# Patient Record
Sex: MALE | Race: BLACK OR AFRICAN AMERICAN | Employment: OTHER | URBAN - METROPOLITAN AREA
[De-identification: names, ages, dates, MRNs, and addresses within clinical notes are randomized per-mention and may not be internally consistent; named-entity substitution may affect disease eponyms.]

---

## 2017-10-10 PROBLEM — Z95.810 S/P ICD (INTERNAL CARDIAC DEFIBRILLATOR) PROCEDURE: Status: ACTIVE | Noted: 2017-10-10

## 2017-11-21 PROBLEM — T82.118D: Status: ACTIVE | Noted: 2017-11-21

## 2017-11-21 PROBLEM — I42.0 DILATED CARDIOMYOPATHY (HCC): Status: ACTIVE | Noted: 2017-11-21

## 2019-07-15 ENCOUNTER — APPOINTMENT (OUTPATIENT)
Dept: CT IMAGING | Age: 54
End: 2019-07-15
Payer: COMMERCIAL

## 2019-07-15 ENCOUNTER — HOSPITAL ENCOUNTER (OUTPATIENT)
Age: 54
Setting detail: OBSERVATION
Discharge: HOME | End: 2019-07-16
Attending: EMERGENCY MEDICINE | Admitting: INTERNAL MEDICINE
Payer: COMMERCIAL

## 2019-07-15 ENCOUNTER — APPOINTMENT (OUTPATIENT)
Dept: GENERAL RADIOLOGY | Age: 54
End: 2019-07-15
Payer: COMMERCIAL

## 2019-07-15 DIAGNOSIS — I50.9 CHRONIC CONGESTIVE HEART FAILURE, UNSPECIFIED HEART FAILURE TYPE (HCC): ICD-10-CM

## 2019-07-15 DIAGNOSIS — R06.89 DYSPNEA AND RESPIRATORY ABNORMALITIES: ICD-10-CM

## 2019-07-15 DIAGNOSIS — R07.9 CHEST PAIN, UNSPECIFIED TYPE: Primary | ICD-10-CM

## 2019-07-15 DIAGNOSIS — Z86.79 HISTORY OF CHRONIC CONGESTIVE HEART FAILURE: ICD-10-CM

## 2019-07-15 DIAGNOSIS — R06.00 DYSPNEA AND RESPIRATORY ABNORMALITIES: ICD-10-CM

## 2019-07-15 PROBLEM — I10 ESSENTIAL HYPERTENSION: Status: ACTIVE | Noted: 2019-07-15

## 2019-07-15 LAB
ALBUMIN SERPL-MCNC: 4.5 G/DL (ref 3.5–5.2)
ALP BLD-CCNC: 72 U/L (ref 40–129)
ALT SERPL-CCNC: 8 U/L (ref 0–40)
ANION GAP SERPL CALCULATED.3IONS-SCNC: 11 MMOL/L (ref 7–16)
AST SERPL-CCNC: 12 U/L (ref 0–39)
BASOPHILS ABSOLUTE: 0.04 E9/L (ref 0–0.2)
BASOPHILS RELATIVE PERCENT: 0.4 % (ref 0–2)
BILIRUB SERPL-MCNC: 2.2 MG/DL (ref 0–1.2)
BUN BLDV-MCNC: 15 MG/DL (ref 6–20)
CALCIUM SERPL-MCNC: 9.5 MG/DL (ref 8.6–10.2)
CHLORIDE BLD-SCNC: 103 MMOL/L (ref 98–107)
CO2: 25 MMOL/L (ref 22–29)
CREAT SERPL-MCNC: 1.5 MG/DL (ref 0.7–1.2)
D DIMER: 285 NG/ML DDU
EOSINOPHILS ABSOLUTE: 0.12 E9/L (ref 0.05–0.5)
EOSINOPHILS RELATIVE PERCENT: 1.2 % (ref 0–6)
GFR AFRICAN AMERICAN: 59
GFR NON-AFRICAN AMERICAN: 59 ML/MIN/1.73
GLUCOSE BLD-MCNC: 182 MG/DL (ref 74–99)
HCT VFR BLD CALC: 44.4 % (ref 37–54)
HEMOGLOBIN: 15.3 G/DL (ref 12.5–16.5)
IMMATURE GRANULOCYTES #: 0.03 E9/L
IMMATURE GRANULOCYTES %: 0.3 % (ref 0–5)
LYMPHOCYTES ABSOLUTE: 2.25 E9/L (ref 1.5–4)
LYMPHOCYTES RELATIVE PERCENT: 23.1 % (ref 20–42)
MAGNESIUM: 1.8 MG/DL (ref 1.6–2.6)
MCH RBC QN AUTO: 30.2 PG (ref 26–35)
MCHC RBC AUTO-ENTMCNC: 34.5 % (ref 32–34.5)
MCV RBC AUTO: 87.6 FL (ref 80–99.9)
MONOCYTES ABSOLUTE: 0.64 E9/L (ref 0.1–0.95)
MONOCYTES RELATIVE PERCENT: 6.6 % (ref 2–12)
NEUTROPHILS ABSOLUTE: 6.64 E9/L (ref 1.8–7.3)
NEUTROPHILS RELATIVE PERCENT: 68.4 % (ref 43–80)
PDW BLD-RTO: 13.1 FL (ref 11.5–15)
PLATELET # BLD: 216 E9/L (ref 130–450)
PMV BLD AUTO: 9.8 FL (ref 7–12)
POTASSIUM REFLEX MAGNESIUM: 3.3 MMOL/L (ref 3.5–5)
PRO-BNP: 4321 PG/ML (ref 0–125)
RBC # BLD: 5.07 E12/L (ref 3.8–5.8)
SODIUM BLD-SCNC: 139 MMOL/L (ref 132–146)
TOTAL PROTEIN: 7.8 G/DL (ref 6.4–8.3)
TROPONIN: <0.01 NG/ML (ref 0–0.03)
WBC # BLD: 9.7 E9/L (ref 4.5–11.5)

## 2019-07-15 PROCEDURE — 83880 ASSAY OF NATRIURETIC PEPTIDE: CPT

## 2019-07-15 PROCEDURE — 85025 COMPLETE CBC W/AUTO DIFF WBC: CPT

## 2019-07-15 PROCEDURE — 99220 PR INITIAL OBSERVATION CARE/DAY 70 MINUTES: CPT | Performed by: INTERNAL MEDICINE

## 2019-07-15 PROCEDURE — 6370000000 HC RX 637 (ALT 250 FOR IP)

## 2019-07-15 PROCEDURE — 83735 ASSAY OF MAGNESIUM: CPT

## 2019-07-15 PROCEDURE — 80053 COMPREHEN METABOLIC PANEL: CPT

## 2019-07-15 PROCEDURE — 99285 EMERGENCY DEPT VISIT HI MDM: CPT

## 2019-07-15 PROCEDURE — 6360000004 HC RX CONTRAST MEDICATION: Performed by: RADIOLOGY

## 2019-07-15 PROCEDURE — 84484 ASSAY OF TROPONIN QUANT: CPT

## 2019-07-15 PROCEDURE — 85378 FIBRIN DEGRADE SEMIQUANT: CPT

## 2019-07-15 PROCEDURE — G0378 HOSPITAL OBSERVATION PER HR: HCPCS

## 2019-07-15 PROCEDURE — 93005 ELECTROCARDIOGRAM TRACING: CPT | Performed by: NURSE PRACTITIONER

## 2019-07-15 PROCEDURE — 71046 X-RAY EXAM CHEST 2 VIEWS: CPT

## 2019-07-15 PROCEDURE — 71275 CT ANGIOGRAPHY CHEST: CPT

## 2019-07-15 RX ORDER — SODIUM CHLORIDE 0.9 % (FLUSH) 0.9 %
10 SYRINGE (ML) INJECTION PRN
Status: DISCONTINUED | OUTPATIENT
Start: 2019-07-15 | End: 2019-07-16 | Stop reason: HOSPADM

## 2019-07-15 RX ORDER — ASPIRIN 325 MG
325 TABLET ORAL ONCE
Status: DISCONTINUED | OUTPATIENT
Start: 2019-07-15 | End: 2019-07-15

## 2019-07-15 RX ORDER — ATORVASTATIN CALCIUM 80 MG/1
80 TABLET, FILM COATED ORAL DAILY
COMMUNITY
End: 2020-01-15 | Stop reason: SDUPTHER

## 2019-07-15 RX ORDER — FUROSEMIDE 10 MG/ML
40 INJECTION INTRAMUSCULAR; INTRAVENOUS ONCE
Status: COMPLETED | OUTPATIENT
Start: 2019-07-15 | End: 2019-07-16

## 2019-07-15 RX ORDER — ASPIRIN 81 MG/1
81 TABLET, CHEWABLE ORAL DAILY
Status: DISCONTINUED | OUTPATIENT
Start: 2019-07-16 | End: 2019-07-16 | Stop reason: HOSPADM

## 2019-07-15 RX ORDER — ASPIRIN 81 MG/1
TABLET, CHEWABLE ORAL
Status: COMPLETED
Start: 2019-07-15 | End: 2019-07-15

## 2019-07-15 RX ORDER — FUROSEMIDE 10 MG/ML
20 INJECTION INTRAMUSCULAR; INTRAVENOUS 2 TIMES DAILY
Status: DISCONTINUED | OUTPATIENT
Start: 2019-07-16 | End: 2019-07-16

## 2019-07-15 RX ORDER — ATORVASTATIN CALCIUM 40 MG/1
80 TABLET, FILM COATED ORAL DAILY
Status: DISCONTINUED | OUTPATIENT
Start: 2019-07-16 | End: 2019-07-16 | Stop reason: SDUPTHER

## 2019-07-15 RX ORDER — ONDANSETRON 2 MG/ML
4 INJECTION INTRAMUSCULAR; INTRAVENOUS EVERY 6 HOURS PRN
Status: DISCONTINUED | OUTPATIENT
Start: 2019-07-15 | End: 2019-07-16 | Stop reason: HOSPADM

## 2019-07-15 RX ORDER — CARVEDILOL 25 MG/1
25 TABLET ORAL 2 TIMES DAILY WITH MEALS
Status: DISCONTINUED | OUTPATIENT
Start: 2019-07-16 | End: 2019-07-16 | Stop reason: HOSPADM

## 2019-07-15 RX ORDER — HYDRALAZINE HYDROCHLORIDE 50 MG/1
100 TABLET, FILM COATED ORAL 3 TIMES DAILY
Status: DISCONTINUED | OUTPATIENT
Start: 2019-07-15 | End: 2019-07-16 | Stop reason: SDUPTHER

## 2019-07-15 RX ORDER — GLIMEPIRIDE 2 MG/1
2 TABLET ORAL
COMMUNITY

## 2019-07-15 RX ORDER — SODIUM CHLORIDE 0.9 % (FLUSH) 0.9 %
10 SYRINGE (ML) INJECTION EVERY 12 HOURS SCHEDULED
Status: DISCONTINUED | OUTPATIENT
Start: 2019-07-15 | End: 2019-07-16 | Stop reason: HOSPADM

## 2019-07-15 RX ORDER — LISINOPRIL 2.5 MG/1
2.5 TABLET ORAL DAILY
Status: DISCONTINUED | OUTPATIENT
Start: 2019-07-16 | End: 2019-07-16

## 2019-07-15 RX ORDER — ASPIRIN 81 MG/1
243 TABLET, CHEWABLE ORAL ONCE
Status: COMPLETED | OUTPATIENT
Start: 2019-07-15 | End: 2019-07-15

## 2019-07-15 RX ORDER — SPIRONOLACTONE 25 MG/1
50 TABLET ORAL DAILY
Status: DISCONTINUED | OUTPATIENT
Start: 2019-07-16 | End: 2019-07-16

## 2019-07-15 RX ADMIN — IOPAMIDOL 80 ML: 755 INJECTION, SOLUTION INTRAVENOUS at 20:21

## 2019-07-15 RX ADMIN — ASPIRIN 81 MG 243 MG: 81 TABLET ORAL at 21:02

## 2019-07-15 RX ADMIN — ASPIRIN 243 MG: 81 TABLET, CHEWABLE ORAL at 21:02

## 2019-07-15 ASSESSMENT — ENCOUNTER SYMPTOMS
NAUSEA: 0
WHEEZING: 0
SINUS PRESSURE: 0
SHORTNESS OF BREATH: 1
SORE THROAT: 0
VOMITING: 0
EYE DISCHARGE: 0
BACK PAIN: 1
DIARRHEA: 0
ABDOMINAL PAIN: 0
EYE REDNESS: 0
EYE PAIN: 0
COUGH: 0

## 2019-07-15 ASSESSMENT — PAIN SCALES - GENERAL: PAINLEVEL_OUTOF10: 0

## 2019-07-15 NOTE — ED PROVIDER NOTES
Cardiac defibrillator placement (Left, 10/10/2017). Social History:  reports that he has never smoked. He has never used smokeless tobacco. He reports that he has current or past drug history. Drug: Marijuana. He reports that he does not drink alcohol. Family History: family history is not on file. The patients home medications have been reviewed. Allergies: Patient has no known allergies.     -------------------------------------------------- RESULTS -------------------------------------------------    LABS:  Results for orders placed or performed during the hospital encounter of 07/15/19   CBC Auto Differential   Result Value Ref Range    WBC 9.7 4.5 - 11.5 E9/L    RBC 5.07 3.80 - 5.80 E12/L    Hemoglobin 15.3 12.5 - 16.5 g/dL    Hematocrit 44.4 37.0 - 54.0 %    MCV 87.6 80.0 - 99.9 fL    MCH 30.2 26.0 - 35.0 pg    MCHC 34.5 32.0 - 34.5 %    RDW 13.1 11.5 - 15.0 fL    Platelets 812 414 - 470 E9/L    MPV 9.8 7.0 - 12.0 fL    Neutrophils % 68.4 43.0 - 80.0 %    Immature Granulocytes % 0.3 0.0 - 5.0 %    Lymphocytes % 23.1 20.0 - 42.0 %    Monocytes % 6.6 2.0 - 12.0 %    Eosinophils % 1.2 0.0 - 6.0 %    Basophils % 0.4 0.0 - 2.0 %    Neutrophils # 6.64 1.80 - 7.30 E9/L    Immature Granulocytes # 0.03 E9/L    Lymphocytes # 2.25 1.50 - 4.00 E9/L    Monocytes # 0.64 0.10 - 0.95 E9/L    Eosinophils # 0.12 0.05 - 0.50 E9/L    Basophils # 0.04 0.00 - 0.20 E9/L   Troponin   Result Value Ref Range    Troponin <0.01 0.00 - 0.03 ng/mL   Brain Natriuretic Peptide   Result Value Ref Range    Pro-BNP 4,321 (H) 0 - 125 pg/mL   Comprehensive Metabolic Panel w/ Reflex to MG   Result Value Ref Range    Sodium 139 132 - 146 mmol/L    Potassium reflex Magnesium 3.3 (L) 3.5 - 5.0 mmol/L    Chloride 103 98 - 107 mmol/L    CO2 25 22 - 29 mmol/L    Anion Gap 11 7 - 16 mmol/L    Glucose 182 (H) 74 - 99 mg/dL    BUN 15 6 - 20 mg/dL    CREATININE 1.5 (H) 0.7 - 1.2 mg/dL    GFR Non-African American 59 >=60 mL/min/1.73    GFR

## 2019-07-16 VITALS
HEIGHT: 72 IN | OXYGEN SATURATION: 97 % | HEART RATE: 81 BPM | WEIGHT: 225.6 LBS | SYSTOLIC BLOOD PRESSURE: 160 MMHG | TEMPERATURE: 98.1 F | DIASTOLIC BLOOD PRESSURE: 80 MMHG | RESPIRATION RATE: 16 BRPM | BODY MASS INDEX: 30.56 KG/M2

## 2019-07-16 LAB
BILIRUBIN URINE: NEGATIVE
BLOOD, URINE: NEGATIVE
CLARITY: CLEAR
COLOR: ABNORMAL
EKG ATRIAL RATE: 81 BPM
EKG P AXIS: 6 DEGREES
EKG P-R INTERVAL: 210 MS
EKG Q-T INTERVAL: 426 MS
EKG QRS DURATION: 118 MS
EKG QTC CALCULATION (BAZETT): 494 MS
EKG R AXIS: 17 DEGREES
EKG T AXIS: 135 DEGREES
EKG VENTRICULAR RATE: 81 BPM
GLUCOSE URINE: 250 MG/DL
KETONES, URINE: NEGATIVE MG/DL
LEUKOCYTE ESTERASE, URINE: NEGATIVE
LV EF: 25 %
LVEF MODALITY: NORMAL
METER GLUCOSE: 182 MG/DL (ref 74–99)
NITRITE, URINE: NEGATIVE
PH UA: 5.5 (ref 5–9)
PROTEIN UA: NEGATIVE MG/DL
SPECIFIC GRAVITY UA: <=1.005 (ref 1–1.03)
TROPONIN: <0.01 NG/ML (ref 0–0.03)
TROPONIN: <0.01 NG/ML (ref 0–0.03)
UROBILINOGEN, URINE: 0.2 E.U./DL

## 2019-07-16 PROCEDURE — 6370000000 HC RX 637 (ALT 250 FOR IP): Performed by: INTERNAL MEDICINE

## 2019-07-16 PROCEDURE — 6360000002 HC RX W HCPCS: Performed by: INTERNAL MEDICINE

## 2019-07-16 PROCEDURE — 84484 ASSAY OF TROPONIN QUANT: CPT

## 2019-07-16 PROCEDURE — 2580000003 HC RX 258: Performed by: INTERNAL MEDICINE

## 2019-07-16 PROCEDURE — 36415 COLL VENOUS BLD VENIPUNCTURE: CPT

## 2019-07-16 PROCEDURE — 96376 TX/PRO/DX INJ SAME DRUG ADON: CPT

## 2019-07-16 PROCEDURE — G0378 HOSPITAL OBSERVATION PER HR: HCPCS

## 2019-07-16 PROCEDURE — 93306 TTE W/DOPPLER COMPLETE: CPT

## 2019-07-16 PROCEDURE — 96374 THER/PROPH/DIAG INJ IV PUSH: CPT

## 2019-07-16 PROCEDURE — 93010 ELECTROCARDIOGRAM REPORT: CPT | Performed by: INTERNAL MEDICINE

## 2019-07-16 PROCEDURE — 99217 PR OBSERVATION CARE DISCHARGE MANAGEMENT: CPT | Performed by: INTERNAL MEDICINE

## 2019-07-16 PROCEDURE — 81003 URINALYSIS AUTO W/O SCOPE: CPT

## 2019-07-16 PROCEDURE — 82962 GLUCOSE BLOOD TEST: CPT

## 2019-07-16 RX ORDER — LISINOPRIL 5 MG/1
5 TABLET ORAL DAILY
Status: DISCONTINUED | OUTPATIENT
Start: 2019-07-16 | End: 2019-07-16

## 2019-07-16 RX ORDER — SPIRONOLACTONE 25 MG/1
25 TABLET ORAL DAILY
Status: DISCONTINUED | OUTPATIENT
Start: 2019-07-16 | End: 2019-07-16 | Stop reason: HOSPADM

## 2019-07-16 RX ORDER — TORSEMIDE 20 MG/1
40 TABLET ORAL DAILY
Qty: 30 TABLET | Refills: 3 | Status: SHIPPED | OUTPATIENT
Start: 2019-07-17 | End: 2020-12-10

## 2019-07-16 RX ORDER — NICOTINE POLACRILEX 4 MG
15 LOZENGE BUCCAL PRN
Status: DISCONTINUED | OUTPATIENT
Start: 2019-07-16 | End: 2019-07-16 | Stop reason: HOSPADM

## 2019-07-16 RX ORDER — DEXTROSE MONOHYDRATE 50 MG/ML
100 INJECTION, SOLUTION INTRAVENOUS PRN
Status: DISCONTINUED | OUTPATIENT
Start: 2019-07-16 | End: 2019-07-16 | Stop reason: HOSPADM

## 2019-07-16 RX ORDER — HYDRALAZINE HYDROCHLORIDE 100 MG/1
100 TABLET, FILM COATED ORAL 3 TIMES DAILY
Qty: 60 TABLET | Refills: 3 | Status: SHIPPED | OUTPATIENT
Start: 2019-07-16

## 2019-07-16 RX ORDER — DEXTROSE MONOHYDRATE 25 G/50ML
12.5 INJECTION, SOLUTION INTRAVENOUS PRN
Status: DISCONTINUED | OUTPATIENT
Start: 2019-07-16 | End: 2019-07-16 | Stop reason: HOSPADM

## 2019-07-16 RX ORDER — HYDRALAZINE HYDROCHLORIDE 100 MG/1
100 TABLET, FILM COATED ORAL 3 TIMES DAILY
Status: DISCONTINUED | OUTPATIENT
Start: 2019-07-16 | End: 2019-07-16 | Stop reason: HOSPADM

## 2019-07-16 RX ORDER — TORSEMIDE 20 MG/1
40 TABLET ORAL DAILY
Status: DISCONTINUED | OUTPATIENT
Start: 2019-07-16 | End: 2019-07-16 | Stop reason: HOSPADM

## 2019-07-16 RX ORDER — ATORVASTATIN CALCIUM 80 MG/1
80 TABLET, FILM COATED ORAL DAILY
Status: DISCONTINUED | OUTPATIENT
Start: 2019-07-16 | End: 2019-07-16 | Stop reason: HOSPADM

## 2019-07-16 RX ADMIN — Medication 10 ML: at 08:43

## 2019-07-16 RX ADMIN — CARVEDILOL 25 MG: 25 TABLET ORAL at 08:43

## 2019-07-16 RX ADMIN — SPIRONOLACTONE 25 MG: 25 TABLET ORAL at 08:43

## 2019-07-16 RX ADMIN — HYDRALAZINE HYDROCHLORIDE 100 MG: 100 TABLET, FILM COATED ORAL at 13:20

## 2019-07-16 RX ADMIN — TORSEMIDE 40 MG: 20 TABLET ORAL at 13:20

## 2019-07-16 RX ADMIN — HYDRALAZINE HYDROCHLORIDE 100 MG: 100 TABLET, FILM COATED ORAL at 07:15

## 2019-07-16 RX ADMIN — FUROSEMIDE 20 MG: 10 INJECTION, SOLUTION INTRAVENOUS at 08:42

## 2019-07-16 RX ADMIN — INSULIN LISPRO 2 UNITS: 100 INJECTION, SOLUTION INTRAVENOUS; SUBCUTANEOUS at 13:25

## 2019-07-16 RX ADMIN — FUROSEMIDE 40 MG: 10 INJECTION, SOLUTION INTRAMUSCULAR; INTRAVENOUS at 00:22

## 2019-07-16 RX ADMIN — LISINOPRIL 5 MG: 5 TABLET ORAL at 08:43

## 2019-07-16 RX ADMIN — ATORVASTATIN CALCIUM 80 MG: 80 TABLET, FILM COATED ORAL at 08:43

## 2019-07-16 RX ADMIN — Medication 10 ML: at 00:20

## 2019-07-16 RX ADMIN — ASPIRIN 81 MG: 81 TABLET, CHEWABLE ORAL at 08:43

## 2019-07-16 ASSESSMENT — PAIN SCALES - GENERAL: PAINLEVEL_OUTOF10: 0

## 2019-07-16 NOTE — PROGRESS NOTES
CHF teaching material provided to patient both the heart failure teaching checklist and the CHF magnets x 2. Discussed the importance of following a low sodium diet, watching weight gain, [facility], and keeping follow up appointments.

## 2019-07-16 NOTE — CONSULTS
CARDIOLOGY CONSULTATION    Patient Name:  Ghada Arreola Sr.    :  1965    Reason for Consultation:   Acute decompensated heart failure    History of Present Illness:   Isabell Lorenz. presents to [facility] following a 2-day history of sudden increase in shortness of breath and inability to lie in a supine position. He went to the emergency room was and was given intravenous diuretic with subsequent improvement. Upon further questioning he absolutely denies ingestion of any salt on his food and insists that he only uses sea salt and Mrs Gavin Bowen. He also states that he was instructed to drink at least 8 cups of water per day. He has not experienced medical noncompliance. Now presents with acute decompensated heart failure. He does have an ICD device implanted by Dr. Jonhny Castillo. He notes that he underwent cardiac catheterization in approximately  and was found to have normal coronary arteriograms. Does have a long-standing history of hypertension however. Past Medical History:   has a past medical history of Cerebral artery occlusion with cerebral infarction [facility]), CHF (congestive heart failure) ([facility] [address] and Hypertension. Surgical History:   has a past surgical history that includes Cardiac defibrillator placement (Left, 10/10/2017). Social History:   reports that he has never smoked. He has never used smokeless tobacco. He reports that he has current or past drug history. Drug: Marijuana. He reports that he does not drink alcohol. Family History:  family history is remarkable for his father  secondary to carcinoma the prostate and mother  CHF. Medications:  Prior to Admission medications    Medication Sig Start Date End Date Taking?  Authorizing Provider   metFORMIN (GLUCOPHAGE) 500 MG tablet Take 500 mg by mouth 2 times daily (with meals)   Yes Historical Provider, MD   glimepiride (AMARYL) 2 MG tablet Take 2 mg by mouth every morning (before intake, or urination. No tremor. · Hematologic/Lymphatic: No abnormal bruising or bleeding, blood clots or swollen lymph nodes. · Allergic/Immunologic: No nasal congestion or hives. Physical Examination:    Vital Signs: BP (!) 160/80   Pulse 81   Temp 98.1 °F (36.7 °C) (Oral)   Resp 16   Ht 6' (1.829 m)   Wt 225 lb 9.6 oz (102.3 kg)   SpO2 97%   BMI 30.60 kg/m²   General appearance: Well preserved, mesomorphic body habitus, alert, no distress. Skin: Skin color, texture, turgor normal. No rashes or lesions. No induration or tightening palpated. Head: Normocephalic. No masses, lesions, tenderness or abnormalities  Eyes: Conjunctivae/corneas clear. PERRL, EOMs intact. Sclera non icteric. Ears: External ears normal. Canals clear. TM's clear bilaterally. Hearing normal to finger rub. Nose/Sinuses: Nares normal. Septum midline. Mucosa normal. No drainage or sinus tenderness. Oropharynx: Lips, mucosa, and tongue normal. Oropharynx clear with no exudate seen. Neck: Neck supple and symmetric. No adenopathy. Thyroid symmetric, normal size, without nodules. Trachea is midline. Carotids brisk in upstroke without bruits, no abnormal JVP noted at 45°. Chest: Even excursion  Lungs: Lungs clear to auscultation bilaterally. No retractions or use of accessory muscles. No tactile vocal fremitus. No rhonchi, crackles or rales. Heart:  S1 > S2. Regular rhythm. Soft S3 gallop grade 2/6 blowing systolic murmur heard at the apex. . No rub, palpable thrill or heave noted. PMI 5th intercostal space midclavicular line. Abdomen: Abdomen soft, moderately protuberant, non-tender. BS normal. No masses, organomegaly. No hernia noted. Extremities: Extremities normal. No deformities, edema, or skin discoloration. No cyanosis or clubbing noted to the nails. Peripheral pulses present 2+ upper extremities and present 1+  lower extremities. Musculoskeletal: Spine ROM normal. Muscular strength intact.   Neuro: Cranial nerves

## 2019-11-04 ENCOUNTER — HOSPITAL ENCOUNTER (EMERGENCY)
Age: 54
Discharge: HOME | End: 2019-11-04
Attending: EMERGENCY MEDICINE
Payer: COMMERCIAL

## 2019-11-04 ENCOUNTER — APPOINTMENT (OUTPATIENT)
Dept: GENERAL RADIOLOGY | Age: 54
End: 2019-11-04
Payer: COMMERCIAL

## 2019-11-04 VITALS
BODY MASS INDEX: 30.48 KG/M2 | SYSTOLIC BLOOD PRESSURE: 143 MMHG | RESPIRATION RATE: 16 BRPM | OXYGEN SATURATION: 97 % | HEIGHT: 72 IN | DIASTOLIC BLOOD PRESSURE: 83 MMHG | WEIGHT: 225 LBS | TEMPERATURE: 97.5 F | HEART RATE: 74 BPM

## 2019-11-04 DIAGNOSIS — I50.43 ACUTE ON CHRONIC COMBINED SYSTOLIC AND DIASTOLIC CHF (CONGESTIVE HEART FAILURE) (HCC): Primary | ICD-10-CM

## 2019-11-04 LAB
ANION GAP SERPL CALCULATED.3IONS-SCNC: 14 MMOL/L (ref 7–16)
APTT: 32.2 SEC (ref 24.5–35.1)
BASOPHILS ABSOLUTE: 0.04 E9/L (ref 0–0.2)
BASOPHILS RELATIVE PERCENT: 0.5 % (ref 0–2)
BUN BLDV-MCNC: 16 MG/DL (ref 6–20)
CALCIUM SERPL-MCNC: 9.6 MG/DL (ref 8.6–10.2)
CHLORIDE BLD-SCNC: 104 MMOL/L (ref 98–107)
CO2: 26 MMOL/L (ref 22–29)
CREAT SERPL-MCNC: 1.4 MG/DL (ref 0.7–1.2)
EKG ATRIAL RATE: 99 BPM
EKG P-R INTERVAL: 194 MS
EKG Q-T INTERVAL: 384 MS
EKG QRS DURATION: 120 MS
EKG QTC CALCULATION (BAZETT): 492 MS
EKG R AXIS: 87 DEGREES
EKG T AXIS: 148 DEGREES
EKG VENTRICULAR RATE: 99 BPM
EOSINOPHILS ABSOLUTE: 0.11 E9/L (ref 0.05–0.5)
EOSINOPHILS RELATIVE PERCENT: 1.4 % (ref 0–6)
GFR AFRICAN AMERICAN: >60
GFR NON-AFRICAN AMERICAN: >60 ML/MIN/1.73
GLUCOSE BLD-MCNC: 164 MG/DL (ref 74–99)
HCT VFR BLD CALC: 41.9 % (ref 37–54)
HEMOGLOBIN: 14 G/DL (ref 12.5–16.5)
IMMATURE GRANULOCYTES #: 0.02 E9/L
IMMATURE GRANULOCYTES %: 0.3 % (ref 0–5)
INR BLD: 1.1
LACTIC ACID: 1.5 MMOL/L (ref 0.5–2.2)
LYMPHOCYTES ABSOLUTE: 1.44 E9/L (ref 1.5–4)
LYMPHOCYTES RELATIVE PERCENT: 18.6 % (ref 20–42)
MAGNESIUM: 1.9 MG/DL (ref 1.6–2.6)
MCH RBC QN AUTO: 29.6 PG (ref 26–35)
MCHC RBC AUTO-ENTMCNC: 33.4 % (ref 32–34.5)
MCV RBC AUTO: 88.6 FL (ref 80–99.9)
MONOCYTES ABSOLUTE: 0.45 E9/L (ref 0.1–0.95)
MONOCYTES RELATIVE PERCENT: 5.8 % (ref 2–12)
NEUTROPHILS ABSOLUTE: 5.67 E9/L (ref 1.8–7.3)
NEUTROPHILS RELATIVE PERCENT: 73.4 % (ref 43–80)
PDW BLD-RTO: 13.1 FL (ref 11.5–15)
PHOSPHORUS: 2.3 MG/DL (ref 2.5–4.5)
PLATELET # BLD: 258 E9/L (ref 130–450)
PMV BLD AUTO: 9.7 FL (ref 7–12)
POTASSIUM SERPL-SCNC: 3.3 MMOL/L (ref 3.5–5)
PRO-BNP: 4833 PG/ML (ref 0–125)
PROTHROMBIN TIME: 12.5 SEC (ref 9.3–12.4)
RBC # BLD: 4.73 E12/L (ref 3.8–5.8)
SODIUM BLD-SCNC: 144 MMOL/L (ref 132–146)
TROPONIN: <0.01 NG/ML (ref 0–0.03)
WBC # BLD: 7.7 E9/L (ref 4.5–11.5)

## 2019-11-04 PROCEDURE — 84100 ASSAY OF PHOSPHORUS: CPT

## 2019-11-04 PROCEDURE — 6370000000 HC RX 637 (ALT 250 FOR IP): Performed by: EMERGENCY MEDICINE

## 2019-11-04 PROCEDURE — 83605 ASSAY OF LACTIC ACID: CPT

## 2019-11-04 PROCEDURE — 99285 EMERGENCY DEPT VISIT HI MDM: CPT

## 2019-11-04 PROCEDURE — 84484 ASSAY OF TROPONIN QUANT: CPT

## 2019-11-04 PROCEDURE — 96374 THER/PROPH/DIAG INJ IV PUSH: CPT

## 2019-11-04 PROCEDURE — 85025 COMPLETE CBC W/AUTO DIFF WBC: CPT

## 2019-11-04 PROCEDURE — 83880 ASSAY OF NATRIURETIC PEPTIDE: CPT

## 2019-11-04 PROCEDURE — 6360000002 HC RX W HCPCS: Performed by: EMERGENCY MEDICINE

## 2019-11-04 PROCEDURE — 80048 BASIC METABOLIC PNL TOTAL CA: CPT

## 2019-11-04 PROCEDURE — 83735 ASSAY OF MAGNESIUM: CPT

## 2019-11-04 PROCEDURE — 85730 THROMBOPLASTIN TIME PARTIAL: CPT

## 2019-11-04 PROCEDURE — 71046 X-RAY EXAM CHEST 2 VIEWS: CPT

## 2019-11-04 PROCEDURE — 85610 PROTHROMBIN TIME: CPT

## 2019-11-04 PROCEDURE — 93010 ELECTROCARDIOGRAM REPORT: CPT | Performed by: INTERNAL MEDICINE

## 2019-11-04 PROCEDURE — 94761 N-INVAS EAR/PLS OXIMETRY MLT: CPT

## 2019-11-04 PROCEDURE — 93005 ELECTROCARDIOGRAM TRACING: CPT | Performed by: EMERGENCY MEDICINE

## 2019-11-04 RX ORDER — FUROSEMIDE 40 MG/1
20 TABLET ORAL ONCE
Status: COMPLETED | OUTPATIENT
Start: 2019-11-04 | End: 2019-11-04

## 2019-11-04 RX ORDER — SPIRONOLACTONE 25 MG/1
25 TABLET ORAL ONCE
Status: COMPLETED | OUTPATIENT
Start: 2019-11-04 | End: 2019-11-04

## 2019-11-04 RX ORDER — HYDRALAZINE HYDROCHLORIDE 50 MG/1
100 TABLET, FILM COATED ORAL ONCE
Status: COMPLETED | OUTPATIENT
Start: 2019-11-04 | End: 2019-11-04

## 2019-11-04 RX ORDER — FUROSEMIDE 10 MG/ML
40 INJECTION INTRAMUSCULAR; INTRAVENOUS ONCE
Status: COMPLETED | OUTPATIENT
Start: 2019-11-04 | End: 2019-11-04

## 2019-11-04 RX ORDER — POTASSIUM CHLORIDE 20 MEQ/1
40 TABLET, EXTENDED RELEASE ORAL ONCE
Status: COMPLETED | OUTPATIENT
Start: 2019-11-04 | End: 2019-11-04

## 2019-11-04 RX ORDER — ASPIRIN 81 MG/1
81 TABLET, CHEWABLE ORAL ONCE
Status: COMPLETED | OUTPATIENT
Start: 2019-11-04 | End: 2019-11-04

## 2019-11-04 RX ORDER — CARVEDILOL 25 MG/1
25 TABLET ORAL ONCE
Status: COMPLETED | OUTPATIENT
Start: 2019-11-04 | End: 2019-11-04

## 2019-11-04 RX ADMIN — CARVEDILOL 25 MG: 25 TABLET, FILM COATED ORAL at 08:04

## 2019-11-04 RX ADMIN — SACUBITRIL AND VALSARTAN 1 TABLET: 24; 26 TABLET, FILM COATED ORAL at 08:05

## 2019-11-04 RX ADMIN — HYDRALAZINE HYDROCHLORIDE 100 MG: 50 TABLET ORAL at 08:05

## 2019-11-04 RX ADMIN — POTASSIUM CHLORIDE 40 MEQ: 20 TABLET, EXTENDED RELEASE ORAL at 08:04

## 2019-11-04 RX ADMIN — ASPIRIN 81 MG: 81 TABLET, CHEWABLE ORAL at 08:03

## 2019-11-04 RX ADMIN — FUROSEMIDE 40 MG: 10 INJECTION, SOLUTION INTRAMUSCULAR; INTRAVENOUS at 07:30

## 2019-11-04 RX ADMIN — FUROSEMIDE 20 MG: 40 TABLET ORAL at 08:05

## 2019-11-04 RX ADMIN — METFORMIN HYDROCHLORIDE 500 MG: 500 TABLET, FILM COATED ORAL at 08:05

## 2019-11-04 RX ADMIN — SPIRONOLACTONE 25 MG: 25 TABLET, FILM COATED ORAL at 08:06

## 2020-01-08 VITALS
SYSTOLIC BLOOD PRESSURE: 152 MMHG | DIASTOLIC BLOOD PRESSURE: 92 MMHG | WEIGHT: 239.86 LBS | HEART RATE: 76 BPM | BODY MASS INDEX: 32.49 KG/M2 | HEIGHT: 72 IN

## 2020-01-11 NOTE — PROGRESS NOTES
Cardiology Progress Note  Dr. Taya Hung      Referring Physician: No referring provider defined for this encounter. CHIEF COMPLAINT: No chief complaint on file. HISTORY OF PRESENT ILLNESS:   Patient is a 48year old male with significant past medical history of nonischemic cardiomyopathy, AICD in situ, hypertension, diabetes mellitus, is here for follow-up appointment. Patient denies any chest pain, no shortness of breath, no lightheadedness, no dizziness, no palpitations, no pedal edema, no PND, no orthopnea, no syncope, no presyncopal episodes. Denies noncompliance with medications and dietary restriction]        Past Medical History:   Diagnosis Date    Cerebral artery occlusion with cerebral infarction ([address]     [facility]est[facility] failure) [facility] [address]     Hypertension          Past Surgical History:   Procedure Laterality Date    CARDIAC DEFIBRILLATOR PLACEMENT Left 10/10/2017    [employer] Dual ICD,          Current Outpatient Medications   Medication Sig Dispense Refill    hydrALAZINE (APRESOLINE) 100 MG tablet Take 1 tablet by mouth 3 times daily 60 tablet 3    sacubitril-valsartan (ENTRESTO) 24-26 MG per tablet Take 1 tablet by mouth 2 times daily 60 tablet 0    torsemide (DEMADEX) 20 MG tablet Take 2 tablets by mouth daily 30 tablet 3    metFORMIN (GLUCOPHAGE) 500 MG tablet Take 500 mg by mouth 2 times daily (with meals)      glimepiride (AMARYL) 2 MG tablet Take 2 mg by mouth every morning (before breakfast)      atorvastatin (LIPITOR) 80 MG tablet Take 80 mg by mouth daily      aspirin 81 MG tablet Take 81 mg by mouth daily      carvedilol (COREG) 25 MG tablet Take 1 tablet by mouth 2 times daily (with meals). 60 tablet 0    spironolactone (ALDACTONE) 25 MG tablet Take 25 mg by mouth daily        No current facility-administered medications for this visit.           Allergies as of 01/15/2020    (No Known Allergies)       Social History     Socioeconomic History    Marital status:      Spouse name: Not on file    Number of children: Not on file    Years of education: Not on file    Highest education level: Not on file   Occupational History    Not on file   Social Needs    Financial resource strain: Not on file    Food insecurity:     Worry: Not on file     Inability: Not on file    Transportation needs:     Medical: Not on file     Non-medical: Not on file   Tobacco Use    Smoking status: Never Smoker    Smokeless tobacco: Never Used   Substance and Sexual Activity    Alcohol use: No     Comment: Quit 12/16    Drug use: Yes     Types: Marijuana    Sexual activity: Not on file   Lifestyle    Physical activity:     Days per week: Not on file     Minutes per session: Not on file    Stress: Not on file   Relationships    Social connections:     Talks on phone: Not on file     Gets together: Not on file     Attends Christian service: Not on file     Active member of club or organization: Not on file     Attends meetings of clubs or organizations: Not on file     Relationship status: Not on file    Intimate partner violence:     Fear of current or ex partner: Not on file     Emotionally abused: Not on file     Physically abused: Not on file     Forced sexual activity: Not on file   Other Topics Concern    Not on file   Social History Narrative    Not on file     No family history for early CAD    REVIEW OF SYSTEMS:     CONSTITUTIONAL:  negative for  fevers, chills, sweats and fatigue  HEENT:  negative for  tinnitus, earaches, nasal congestion and epistaxis  RESPIRATORY:  negative for  dry cough, cough with sputum, dyspnea, wheezing and hemoptysis  GASTROINTESTINAL:  negative for nausea, vomiting, diarrhea, constipation, pruritus and jaundice  HEMATOLOGIC/LYMPHATIC:  negative for easy bruising, bleeding, lymphadenopathy and petechiae  ENDOCRINE:  negative for heat intolerance, cold intolerance, tremor, hair loss and diabetic symptoms including neither polyuria nor ejection fraction is 20-25±5%. Diastolic function cannot be accurately assessed due to significant mitral   regurgitation. The LAESV Index is >34 ml/m2. Defibrillator wire visualized in right ventricle   Increased E point septal separation noted,suggesting decreased LV cardiac   output. Delayed closure of mitral valve suggesting increased end diastolic   pressure. Moderate to severe mitral regurgitation is present. Physiologic and/or trace tricuspid regurgitation. RVSP is 47 mmHg. Pulmonary hypertension is mild . Physiologic and/or trace pulmonic regurgitation present. Technically good quality study. Technically difficult study due to patient''s body habitus. Compared to prior echo, changes noted. Suggest clinical correlation. Stress Test: 12/13/16   Limited visualization of the inferior wall left ventricular myocardium   due to some diaphragmatic activity and attenuation artifact. Remaining   left ventricular myocardium shows no areas of reversibility to suggest   focal ischemia. There is global left ventricular hypokinesis on gated   imaging. Left ventricle is dilated with end-diastolic volume of 806   mL. Ejection fraction calculated at 22%.        Angiography:   Cardiology Labs:   BMP:    Lab Results   Component Value Date     11/04/2019    K 3.3 11/04/2019    K 3.3 07/15/2019     11/04/2019    CO2 26 11/04/2019    BUN 16 11/04/2019     CMP:    Lab Results   Component Value Date     11/04/2019    K 3.3 11/04/2019    K 3.3 07/15/2019     11/04/2019    CO2 26 11/04/2019    BUN 16 11/04/2019    PROT 7.8 07/15/2019     CBC:    Lab Results   Component Value Date    WBC 7.7 11/04/2019    RBC 4.73 11/04/2019    HGB 14.0 11/04/2019    HCT 41.9 11/04/2019    MCV 88.6 11/04/2019    RDW 13.1 11/04/2019     11/04/2019     PT/INR:  No results found for: PTINR  PT/INR Warfarin:  No components found for: PTPATWAR, PTINRWAR  PTT:    Lab Results   Component Value Date APTT 32.2 11/04/2019     PTT Heparin:  No components found for: APTTHEP  Magnesium:    Lab Results   Component Value Date    MG 1.9 11/04/2019     TSH:    Lab Results   Component Value Date    TSH 1.783 12/02/2010     TROPONIN:  No components found for: TROP  BNP:  No results found for: BNP  FASTING LIPID PANEL:    Lab Results   Component Value Date    CHOL 263 01/05/2012    HDL 47.0 01/05/2012    TRIG 161 01/05/2012     No orders to display     I have personally reviewed the laboratory, cardiac diagnostic and radiographic testing as outlined above:      IMPRESSION:  1. Dilated nonischemic cardiomyopathy: EF 20-25%, stable we will continue current treatment  2. Congestive heart failure: acute, systolic, decompensated, improving, will continue current treatment. 3. AICD in situ  4. mild to moderate MR  5. Mild TR  6. mild pulmonary HTN  7. HTN; controlled. 8.  Type 2 diabetes mellitus     RECOMMENDATIONS:   1. Continue current treatment    2. CHF: Daily weight, take an extra Lasix for weight gain of more than 2-3 pounds in 24 hours, compliance with diuretics, low-salt diet were all advised. 3.  Preventive cardiology: Low-salt, low-cholesterol diet, daily exercise, were all advised. 4.  Follow-up with Dr. Richard Davies as scheduled    5. Follow-up with Dr. Arleen Griffiths in 6 months, sooner if symptomatic for any reason    I have reviewed my findings and recommendations with patient and his wife at bedside    Electronically signed by Yamile Menjivar MD on 1/10/2020 at 7:52 PM  NOTE: This report was transcribed using voice recognition software.  Every effort was made to ensure accuracy; however, inadvertent computerized transcription errors may be present

## 2020-01-13 VITALS
WEIGHT: 240 LBS | BODY MASS INDEX: 32.51 KG/M2 | HEIGHT: 72 IN | SYSTOLIC BLOOD PRESSURE: 152 MMHG | HEART RATE: 76 BPM | DIASTOLIC BLOOD PRESSURE: 92 MMHG

## 2020-01-13 RX ORDER — HYDRALAZINE HYDROCHLORIDE 50 MG/1
50 TABLET, FILM COATED ORAL 3 TIMES DAILY
COMMUNITY
End: 2020-01-15 | Stop reason: DRUGHIGH

## 2020-01-13 RX ORDER — ATORVASTATIN CALCIUM 40 MG/1
40 TABLET, FILM COATED ORAL DAILY
COMMUNITY
End: 2020-12-10

## 2020-01-15 ENCOUNTER — OFFICE VISIT (OUTPATIENT)
Dept: CARDIOLOGY CLINIC | Age: 55
End: 2020-01-15
Payer: COMMERCIAL

## 2020-01-15 VITALS
SYSTOLIC BLOOD PRESSURE: 138 MMHG | WEIGHT: 230.6 LBS | HEIGHT: 72 IN | OXYGEN SATURATION: 98 % | BODY MASS INDEX: 31.23 KG/M2 | RESPIRATION RATE: 18 BRPM | DIASTOLIC BLOOD PRESSURE: 82 MMHG | HEART RATE: 74 BPM

## 2020-01-15 PROCEDURE — 99214 OFFICE O/P EST MOD 30 MIN: CPT | Performed by: INTERNAL MEDICINE

## 2020-01-15 PROCEDURE — 93000 ELECTROCARDIOGRAM COMPLETE: CPT | Performed by: INTERNAL MEDICINE

## 2020-01-15 RX ORDER — POTASSIUM CHLORIDE 750 MG/1
10 TABLET, FILM COATED, EXTENDED RELEASE ORAL DAILY
COMMUNITY
Start: 2019-11-08 | End: 2020-12-10

## 2020-01-15 RX ORDER — FUROSEMIDE 20 MG/1
20 TABLET ORAL DAILY
COMMUNITY
Start: 2019-12-13

## 2020-04-13 ENCOUNTER — HOSPITAL ENCOUNTER (EMERGENCY)
Age: 55
Discharge: HOME | End: 2020-04-13
Attending: EMERGENCY MEDICINE
Payer: COMMERCIAL

## 2020-04-13 ENCOUNTER — APPOINTMENT (OUTPATIENT)
Dept: GENERAL RADIOLOGY | Age: 55
End: 2020-04-13
Payer: COMMERCIAL

## 2020-04-13 VITALS
OXYGEN SATURATION: 96 % | WEIGHT: 229 LBS | HEIGHT: 72 IN | HEART RATE: 75 BPM | BODY MASS INDEX: 31.02 KG/M2 | RESPIRATION RATE: 14 BRPM | TEMPERATURE: 98.1 F | DIASTOLIC BLOOD PRESSURE: 90 MMHG | SYSTOLIC BLOOD PRESSURE: 144 MMHG

## 2020-04-13 LAB
ANION GAP SERPL CALCULATED.3IONS-SCNC: 12 MMOL/L (ref 7–16)
BASOPHILS ABSOLUTE: 0.04 E9/L (ref 0–0.2)
BASOPHILS RELATIVE PERCENT: 0.7 % (ref 0–2)
BUN BLDV-MCNC: 18 MG/DL (ref 6–20)
CALCIUM SERPL-MCNC: 9.1 MG/DL (ref 8.6–10.2)
CHLORIDE BLD-SCNC: 102 MMOL/L (ref 98–107)
CK MB: 2.1 NG/ML (ref 0–7.7)
CO2: 25 MMOL/L (ref 22–29)
CREAT SERPL-MCNC: 1.6 MG/DL (ref 0.7–1.2)
EKG ATRIAL RATE: 77 BPM
EKG P AXIS: 16 DEGREES
EKG P-R INTERVAL: 202 MS
EKG Q-T INTERVAL: 442 MS
EKG QRS DURATION: 126 MS
EKG QTC CALCULATION (BAZETT): 500 MS
EKG R AXIS: -28 DEGREES
EKG T AXIS: 108 DEGREES
EKG VENTRICULAR RATE: 77 BPM
EOSINOPHILS ABSOLUTE: 0.17 E9/L (ref 0.05–0.5)
EOSINOPHILS RELATIVE PERCENT: 2.8 % (ref 0–6)
GFR AFRICAN AMERICAN: 55
GFR NON-AFRICAN AMERICAN: 55 ML/MIN/1.73
GLUCOSE BLD-MCNC: 148 MG/DL (ref 74–99)
HCT VFR BLD CALC: 40.6 % (ref 37–54)
HEMOGLOBIN: 13.7 G/DL (ref 12.5–16.5)
IMMATURE GRANULOCYTES #: 0.02 E9/L
IMMATURE GRANULOCYTES %: 0.3 % (ref 0–5)
LYMPHOCYTES ABSOLUTE: 2.16 E9/L (ref 1.5–4)
LYMPHOCYTES RELATIVE PERCENT: 36 % (ref 20–42)
MCH RBC QN AUTO: 29.3 PG (ref 26–35)
MCHC RBC AUTO-ENTMCNC: 33.7 % (ref 32–34.5)
MCV RBC AUTO: 86.8 FL (ref 80–99.9)
MONOCYTES ABSOLUTE: 0.32 E9/L (ref 0.1–0.95)
MONOCYTES RELATIVE PERCENT: 5.3 % (ref 2–12)
NEUTROPHILS ABSOLUTE: 3.29 E9/L (ref 1.8–7.3)
NEUTROPHILS RELATIVE PERCENT: 54.9 % (ref 43–80)
PDW BLD-RTO: 13.7 FL (ref 11.5–15)
PLATELET # BLD: 202 E9/L (ref 130–450)
PMV BLD AUTO: 9.9 FL (ref 7–12)
POTASSIUM REFLEX MAGNESIUM: 3.6 MMOL/L (ref 3.5–5)
PRO-BNP: 6220 PG/ML (ref 0–125)
RBC # BLD: 4.68 E12/L (ref 3.8–5.8)
SODIUM BLD-SCNC: 139 MMOL/L (ref 132–146)
TOTAL CK: 70 U/L (ref 20–200)
TROPONIN: <0.01 NG/ML (ref 0–0.03)
WBC # BLD: 6 E9/L (ref 4.5–11.5)

## 2020-04-13 PROCEDURE — 82553 CREATINE MB FRACTION: CPT

## 2020-04-13 PROCEDURE — 99285 EMERGENCY DEPT VISIT HI MDM: CPT

## 2020-04-13 PROCEDURE — 93010 ELECTROCARDIOGRAM REPORT: CPT | Performed by: INTERNAL MEDICINE

## 2020-04-13 PROCEDURE — 96374 THER/PROPH/DIAG INJ IV PUSH: CPT

## 2020-04-13 PROCEDURE — 84484 ASSAY OF TROPONIN QUANT: CPT

## 2020-04-13 PROCEDURE — 6360000002 HC RX W HCPCS: Performed by: EMERGENCY MEDICINE

## 2020-04-13 PROCEDURE — 6370000000 HC RX 637 (ALT 250 FOR IP): Performed by: EMERGENCY MEDICINE

## 2020-04-13 PROCEDURE — 83880 ASSAY OF NATRIURETIC PEPTIDE: CPT

## 2020-04-13 PROCEDURE — 85025 COMPLETE CBC W/AUTO DIFF WBC: CPT

## 2020-04-13 PROCEDURE — 71045 X-RAY EXAM CHEST 1 VIEW: CPT

## 2020-04-13 PROCEDURE — 80048 BASIC METABOLIC PNL TOTAL CA: CPT

## 2020-04-13 PROCEDURE — 82550 ASSAY OF CK (CPK): CPT

## 2020-04-13 PROCEDURE — 93005 ELECTROCARDIOGRAM TRACING: CPT | Performed by: EMERGENCY MEDICINE

## 2020-04-13 RX ORDER — NITROGLYCERIN 0.4 MG/1
0.4 TABLET SUBLINGUAL EVERY 5 MIN PRN
Status: DISCONTINUED | OUTPATIENT
Start: 2020-04-13 | End: 2020-04-13 | Stop reason: HOSPADM

## 2020-04-13 RX ORDER — DILTIAZEM HYDROCHLORIDE 5 MG/ML
20 INJECTION INTRAVENOUS ONCE
Status: DISCONTINUED | OUTPATIENT
Start: 2020-04-13 | End: 2020-04-13

## 2020-04-13 RX ORDER — FUROSEMIDE 10 MG/ML
60 INJECTION INTRAMUSCULAR; INTRAVENOUS ONCE
Status: COMPLETED | OUTPATIENT
Start: 2020-04-13 | End: 2020-04-13

## 2020-04-13 RX ADMIN — FUROSEMIDE 60 MG: 10 INJECTION, SOLUTION INTRAVENOUS at 01:50

## 2020-04-13 RX ADMIN — NITROGLYCERIN 0.4 MG: 0.4 TABLET, ORALLY DISINTEGRATING SUBLINGUAL at 01:50

## 2020-04-14 ENCOUNTER — CARE COORDINATION (OUTPATIENT)
Dept: CARE COORDINATION | Age: 55
End: 2020-04-14

## 2020-04-15 NOTE — ED PROVIDER NOTES
HPI:  4/15/20,   Time: 4:50 AM EDT         Narcisa Dennison is a 48 y.o. male presenting to the ED for shortness of breath and history of congestive heart failure and patient believes he needs more Lasix, beginning 1 day ago. The complaint has been intermittent, moderate in severity, and worsened by light exertion. And also with laying down flat. Denies chest pain or palpitations and denies fatigue and no abdominal pain or vomiting and no diaphoresis or nausea    ROS:   Pertinent positives and negatives are stated within HPI, all other systems reviewed and are negative.  --------------------------------------------- PAST HISTORY ---------------------------------------------  Past Medical History:  has a past medical history of Cerebral artery occlusion with cerebral infarction ([facility] [address] CHF (congestive heart failure) ([facility] [address] Dilated cardiomyopathy ([facility] [address] Hypertension, Nonrheumatic mitral (valve) insufficiency, and Nonrheumatic tricuspid (valve) insufficiency. Past Surgical History:  has a past surgical history that includes Cardiac defibrillator placement (Left, 10/10/2017). Social History:  reports that he has never smoked. He has never used smokeless tobacco. He reports previous alcohol use. He reports current drug use. Frequency: 7.00 times per week. Drug: Marijuana. Family History: family history includes Heart Failure in his mother; Hypertension in his mother. The patients home medications have been reviewed. Allergies: Patient has no known allergies.     -------------------------------------------------- RESULTS -------------------------------------------------  All laboratory and radiology results have been personally reviewed by myself   LABS:  Results for orders placed or performed during the hospital encounter of 04/13/20   CBC Auto Differential   Result Value Ref Range    WBC 6.0 4.5 - 11.5 E9/L    RBC 4.68 3.80 - 5.80 E12/L    Hemoglobin 13.7 12.5 - 16.5 g/dL    Hematocrit 40.6 37.0 - Pulse 75   Temp 98.1 °F (36.7 °C) (Oral)   Resp 14   Ht 6' (1.829 m)   Wt 229 lb (103.9 kg)   SpO2 96%   BMI 31.06 kg/m²   Oxygen Saturation Interpretation: Normal      ---------------------------------------------------PHYSICAL EXAM--------------------------------------      Constitutional/General: Alert and oriented x3, well appearing, non toxic in NAD  Head: NC/AT  Eyes: PERRL, EOMI  Mouth: Oropharynx clear, handling secretions, no trismus  Neck: Supple, full ROM, no meningeal signs  Pulmonary: Lungs clear to auscultation bilaterally but diminished in bilateral bases, no wheezes, rales, or rhonchi. Not in respiratory distress  Cardiovascular:  Regular rate and rhythm, no murmurs, gallops, or rubs. 2+ distal pulses  Abdomen: Soft, non tender, non distended,   Extremities: Moves all extremities x 4. Warm and well perfused  Skin: warm and dry without rash  Neurologic: GCS 15,  Psych: Normal Affect      ------------------------------ ED COURSE/MEDICAL DECISION MAKING----------------------  Medications   furosemide (LASIX) injection 60 mg (60 mg Intravenous Given 4/13/20 0150)         Medical Decision Making:    Hypertension controlled with nitroglycerin along with Lasix    Counseling: The emergency provider has spoken with the patient and discussed todays results, in addition to providing specific details for the plan of care and counseling regarding the diagnosis and prognosis. Questions are answered at this time and they are agreeable with the plan.      --------------------------------- IMPRESSION AND DISPOSITION ---------------------------------    IMPRESSION  1. Congestive heart failure, unspecified HF chronicity, unspecified heart failure type ([facility] [address] Stable   2.  Hypertension, unspecified type Controlled       DISPOSITION  Disposition: Discharge to home  Patient condition is stable      ED course: Patient's blood pressure improved significantly with both Lasix and nitroglycerin patient's cardiac panel unremarkable therefore patient discharged and told to double his Lasix for the next 3 days    Critical care time 42 minutes        Pinky Camargo MD  04/15/20 0908

## 2020-11-04 NOTE — PROGRESS NOTES
MG tablet Take 40 mg by mouth daily      torsemide (DEMADEX) 20 MG tablet Take 2 tablets by mouth daily (Patient not taking: Reported on 11/5/2020) 30 tablet 3    metFORMIN (GLUCOPHAGE) 500 MG tablet Take 500 mg by mouth 2 times daily (with meals)       No current facility-administered medications for this visit. Allergies as of 11/05/2020    (No Known Allergies)       Social History     Socioeconomic History    Marital status:      Spouse name: Not on file    Number of children: Not on file    Years of education: Not on file    Highest education level: Not on file   Occupational History    Not on file   Social Needs    Financial resource strain: Not on file    Food insecurity     Worry: Not on file     Inability: Not on file    Transportation needs     Medical: Not on file     Non-medical: Not on file   Tobacco Use    Smoking status: Never Smoker    Smokeless tobacco: Never Used   Substance and Sexual Activity    Alcohol use: Not Currently     Comment: Quit 12/16    Drug use: Yes     Frequency: 7.0 times per week     Types: Marijuana    Sexual activity: Not on file   Lifestyle    Physical activity     Days per week: Not on file     Minutes per session: Not on file    Stress: Not on file   Relationships    Social connections     Talks on phone: Not on file     Gets together: Not on file     Attends Sikh service: Not on file     Active member of club or organization: Not on file     Attends meetings of clubs or organizations: Not on file     Relationship status: Not on file    Intimate partner violence     Fear of current or ex partner: Not on file     Emotionally abused: Not on file     Physically abused: Not on file     Forced sexual activity: Not on file   Other Topics Concern    Not on file   Social History Narrative    Denies caffeine.       No family history for early CAD    REVIEW OF SYSTEMS:     CONSTITUTIONAL:  negative for  fevers, chills, sweats and fatigue  HEENT: negative for  tinnitus, earaches, nasal congestion and epistaxis  RESPIRATORY:  negative for  dry cough, cough with sputum, dyspnea, wheezing and hemoptysis  GASTROINTESTINAL:  negative for nausea, vomiting, diarrhea, constipation, pruritus and jaundice  HEMATOLOGIC/LYMPHATIC:  negative for easy bruising, bleeding, lymphadenopathy and petechiae  ENDOCRINE:  negative for heat intolerance, cold intolerance, tremor, hair loss and diabetic symptoms including neither polyuria nor polydipsia nor blurred vision  MUSCULOSKELETAL:  negative for  myalgias, arthralgias, joint swelling, stiff joints and decreased range of motion  NEUROLOGICAL:  negative for memory problems, speech problems, visual disturbance, dysphagia, weakness and numbness      PHYSICAL EXAM:   Constitutional:  Awake, alert cooperative, no apparant distress, and appears stated age. HEENT:  Moist and pink mucous membranes, normocephalic, without obvious abnormality, atraumatic, normal ears and nose. Neck:  Supple, symmetrical, trachea midline, no JVD, no adenopathy, thyroid symmetric, not enlarged and no tenderness, good carotid upstroke bilaterally, no carotid bruit, skin normal  Lungs: No increased work of breathing, good air exchange, clear to ascultation bilaterally, no crackles or wheezing. Cardiovascular: Laterally displaced PMI, regular rate and rhythm, normal S1 and S2, no S3 or S4, 3/6 systolic murmur at the apex, 3/6 systolic murmur at the left lower sternal border, no pedal edema, good carotid upstroke bilaterally, no carotid bruit, no JVD, no adbominal pulsating masses. Abdomen: Soft, nontender, no hepatomegaly, no splenomegaly, bowel sound positive. CHEST:  Expands symmetrically, nontender to palpation, has LifeVest on. Musculoskeletal:  No clubbing or cyanosis. No redness, warmth, or swelling of the joints. Neurological: Alert, awake, and oriented X3     Blood pressure (!) 142/94, pulse 81, resp.  rate 16, height 6' (1.829 m), weight 226 lb (102.5 kg). DATA:  I personally reviewed the admission EKG with the following interpretation: Sinus rhythm, possible left atrial enlargement , nonspecific intraventricular conduction delays, T wave changes in the lateral leads    EKG 4/13/20 Normal sinus rhythm  Possible Left atrial enlargement  Nonspecific intraventricular block  T wave abnormality, consider lateral ischemia      ECHO: 7/16/19   Left ventricle severely dilated. Global hypokinesis is noted to be severe. Moderate left ventricular concentric hypertrophy noted. Estimated left ventricular ejection fraction is 20-25±5%. Diastolic function cannot be accurately assessed due to significant mitral   regurgitation. The LAESV Index is >34 ml/m2. Defibrillator wire visualized in right ventricle   Increased E point septal separation noted,suggesting decreased LV cardiac   output. Delayed closure of mitral valve suggesting increased end diastolic   pressure. Moderate to severe mitral regurgitation is present. Physiologic and/or trace tricuspid regurgitation. RVSP is 47 mmHg. Pulmonary hypertension is mild . Physiologic and/or trace pulmonic regurgitation present. Technically good quality study. Technically difficult study due to patient''s body habitus. Compared to prior echo, changes noted. Suggest clinical correlation. Stress Test: 12/13/16   Limited visualization of the inferior wall left ventricular myocardium   due to some diaphragmatic activity and attenuation artifact. Remaining   left ventricular myocardium shows no areas of reversibility to suggest   focal ischemia. There is global left ventricular hypokinesis on gated   imaging. Left ventricle is dilated with end-diastolic volume of 129   mL. Ejection fraction calculated at 22%.        Angiography:   Cardiology Labs:   BMP:    Lab Results   Component Value Date     04/13/2020    K 3.6 04/13/2020     04/13/2020    CO2 25 04/13/2020    BUN 18 04/13/2020     CMP:    Lab Results   Component Value Date     04/13/2020    K 3.6 04/13/2020     04/13/2020    CO2 25 04/13/2020    BUN 18 04/13/2020    PROT 7.8 07/15/2019     CBC:    Lab Results   Component Value Date    WBC 6.0 04/13/2020    RBC 4.68 04/13/2020    HGB 13.7 04/13/2020    HCT 40.6 04/13/2020    MCV 86.8 04/13/2020    RDW 13.7 04/13/2020     04/13/2020     PT/INR:  No results found for: PTINR  PT/INR Warfarin:  No components found for: PTPATWAR, PTINRWAR  PTT:    Lab Results   Component Value Date    APTT 32.2 11/04/2019     PTT Heparin:  No components found for: APTTHEP  Magnesium:    Lab Results   Component Value Date    MG 1.9 11/04/2019     TSH:    Lab Results   Component Value Date    TSH 1.783 12/02/2010     TROPONIN:  No components found for: TROP  BNP:  No results found for: BNP  FASTING LIPID PANEL:    Lab Results   Component Value Date    CHOL 263 01/05/2012    HDL 47.0 01/05/2012    TRIG 161 01/05/2012     No orders to display     I have personally reviewed the laboratory, cardiac diagnostic and radiographic testing as outlined above:      IMPRESSION:  1. Dilated nonischemic cardiomyopathy: EF 20-25%, stable we will continue current treatment  2. Congestive heart failure: Systolic, chronic compensated, will continue current treatment. 3. AICD in situ  4. mild to moderate MR  5. Mild TR  6. mild pulmonary HTN  7. HTN; controlled. 8. Type 2 diabetes mellitus  9. Stage III chronic kidney disease      RECOMMENDATIONS:   1. Continue current treatment    2. CHF: Daily weight, take an extra Lasix for weight gain of more than 2-3 pounds in 24 hours, compliance with diuretics, low-salt diet were all advised. 3.  Preventive cardiology: Low-salt, low-cholesterol diet, daily exercise, were all advised. 4.  Follow-up with Dr. Beni Venegas as scheduled    5.   Follow-up with Dr. Angie Rich in 6 months, sooner if symptomatic for any reason    I have reviewed my findings and recommendations with patient     Electronically signed by Maine Miller MD on 11/5/2020 at 2:04 PM  NOTE: This report was transcribed using voice recognition software.  Every effort was made to ensure accuracy; however, inadvertent computerized transcription errors may be present

## 2020-11-05 ENCOUNTER — OFFICE VISIT (OUTPATIENT)
Dept: CARDIOLOGY CLINIC | Age: 55
End: 2020-11-05
Payer: COMMERCIAL

## 2020-11-05 VITALS
HEIGHT: 72 IN | RESPIRATION RATE: 16 BRPM | HEART RATE: 81 BPM | SYSTOLIC BLOOD PRESSURE: 142 MMHG | DIASTOLIC BLOOD PRESSURE: 94 MMHG | BODY MASS INDEX: 30.61 KG/M2 | WEIGHT: 226 LBS

## 2020-11-05 PROCEDURE — 93000 ELECTROCARDIOGRAM COMPLETE: CPT | Performed by: INTERNAL MEDICINE

## 2020-11-05 PROCEDURE — 99214 OFFICE O/P EST MOD 30 MIN: CPT | Performed by: INTERNAL MEDICINE

## 2020-12-10 ENCOUNTER — HOSPITAL ENCOUNTER (OUTPATIENT)
Age: 55
Discharge: HOME | End: 2020-12-12
Payer: COMMERCIAL

## 2020-12-10 PROCEDURE — U0003 INFECTIOUS AGENT DETECTION BY NUCLEIC ACID (DNA OR RNA); SEVERE ACUTE RESPIRATORY SYNDROME CORONAVIRUS 2 (SARS-COV-2) (CORONAVIRUS DISEASE [COVID-19]), AMPLIFIED PROBE TECHNIQUE, MAKING USE OF HIGH THROUGHPUT TECHNOLOGIES AS DESCRIBED BY CMS-2020-01-R: HCPCS

## 2020-12-10 RX ORDER — M-VIT,TX,IRON,MINS/CALC/FOLIC 27MG-0.4MG
1 TABLET ORAL DAILY
COMMUNITY
End: 2020-12-10

## 2020-12-10 NOTE — PROGRESS NOTES
Discussed with Dr. Becki Schmitz  that the patient has not had a recent AICD interrogation. Message left with Dr. Violeta Lopez' office requesting interrogation of AICD prior to procedure.

## 2020-12-10 NOTE — PROGRESS NOTES
[facility] PRE-ADMISSION TESTING INSTRUCTIONS    The Preadmission Testing patient is instructed accordingly using the following criteria (check applicable):    ARRIVAL INSTRUCTIONS:  [x] Parking the day of Surgery is located in the Main Entrance lot. Upon entering the door, make an immediate right to the surgery reception desk    [x] Bring photo ID and insurance card    [] Bring in a copy of Living will or Durable Power of  papers. [x] Please be sure to arrange for responsible adult to provide transportation to and from the hospital    [x] Please arrange for responsible adult to be with you for the 24 hour period post procedure due to having anesthesia      GENERAL INSTRUCTIONS:    [x] Nothing by mouth after midnight, including gum, candy, mints or water    [x] You may brush your teeth, but do not swallow any water    [x] Take medications as instructed with 1-2 oz of water    [] Stop herbal supplements and vitamins 5 days prior to procedure    [x] Follow preop dosing of blood thinners per physician instructions    [] Take 1/2 dose of evening insulin, but no insulin after midnight    [x] No oral diabetic medications after midnight    [x] If diabetic and have low blood sugar or feel symptomatic, take 1-2oz apple juice only    [] Bring inhalers day of surgery    [] Bring C-PAP/ Bi-Pap day of surgery    [] Bring urine specimen day of surgery    [x] Shower or bath with soap, lather and rinse well, AM of Surgery, no lotion, powders or creams to surgical site    [x] Follow bowel prep as instructed per surgeon    [x] No tobacco products within 24 hours of surgery     [x] No alcohol or illegal drug use within 24 hours of surgery.     [x] Jewelry, body piercing's, eyeglasses, contact lenses and dentures are not permitted into surgery (bring cases)      [x] Please do not wear any nail polish, make up or hair products on the day of surgery    [x] You can expect a call the business day prior to procedure to notify you if your arrival time changes    [x] If you receive a survey after surgery we would greatly appreciate your comments    [] Parent/guardian of a minor must accompany their child and remain on the premises  the entire time they are under our care     [] Pediatric patients may bring favorite toy, blanket or comfort item with them    [] A caregiver or family member must remain with the patient during their stay if they are mentally handicapped, have dementia, disoriented or unable to use a call light or would be a safety concern if left unattended    [x] Please notify surgeon if you develop any illness between now and time of surgery (cold, cough, sore throat, fever, nausea, vomiting) or any signs of infections  including skin, wounds, and dental.    []  The Outpatient Pharmacy is available to fill your prescription here on your day of surgery, ask your preop nurse for details    [] Other instructions    EDUCATIONAL MATERIALS PROVIDED:    [] PAT Preoperative Education Packet/Booklet     [] Medication List    [] Transfusion bracelet applied with instructions    [] Shower with soap, lather and rinse well, and use CHG wipes provided the evening before surgery as instructed    [] Incentive spirometer with instructions

## 2020-12-11 LAB
SARS-COV-2: NOT DETECTED
SOURCE: NORMAL

## 2021-01-14 NOTE — PROGRESS NOTES
[facility] PRE-ADMISSION TESTING INSTRUCTIONS    The Preadmission Testing patient is instructed accordingly using the following criteria (check applicable):    ARRIVAL INSTRUCTIONS:  [x] Parking the day of Surgery is located in the Main Entrance lot. Upon entering the door, make an immediate right to the surgery reception desk    [x] Bring photo ID and insurance card    [] Bring in a copy of Living will or Durable Power of  papers. [x] Please be sure to arrange transportation to and from the hospital    [x] Please arrange for someone to be with you the remainder of the day due to having anesthesia      GENERAL INSTRUCTIONS:    [x] Nothing by mouth after midnight, including gum, candy, mints or water    [x] You may brush your teeth, but do not swallow any water    [x] Take medications as instructed with 1-2 oz of water    [x] Stop herbal supplements and vitamins 5 days prior to procedure    [x] Follow preop dosing of blood thinners per physician instructions    [x] Do not take insulin or oral diabetic medications    [] If diabetic and have low blood sugar or feel symptomatic, take 1-2oz apple juice or glucose tablets    [] Bring inhalers day of surgery    [] Bring C-PAP/ Bi-Pap day of surgery    [] Bring urine specimen day of surgery    []  Soap shower or bath AM of Surgery, no lotion, powders or creams to surgical site    [] Follow bowel prep as instructed per surgeon    [] No tobacco products within 24 hours of surgery     [x] No alcohol or illegal drug use within 24 hours of surgery.     [x] Jewelry, body piercing's, eyeglasses, contact lenses and dentures are not permitted into surgery (bring cases)      [] Please do not wear any nail polish or make up on the day of surgery    [x] If not already done, you can expect a call from registration    [x] If surgeon requests a time change you will be notified the day prior to surgery    [] If you receive a survey after surgery we would greatly appreciate your comments    [] Parent/guardian of a minor must accompany their child and remain on the premises  the entire time they are under our care     [] Pediatric patients may bring favorite toy, blanket or comfort item with them    [] A caregiver or family member must remain with the patient during their stay if they are mentally handicapped, have dementia, disoriented or unable to use a call light or would be a safety concern if left unattended    [x] Please notify surgeon if you develop any illness between now and time of surgery (cold, cough, sore throat, fever, nausea, vomiting) or any signs of infections  including skin, wounds, and dental.    [] Other instructions    EDUCATIONAL MATERIALS PROVIDED:    [] PAT Preoperative Education Packet/Booklet     [] Medication List    [] Fluoroscopy Information Pamphlet    [] Transfusion bracelet applied with instructions    [] Joint replacement video reviewed    [] Shower with antibacterial soap and use CHG wipes provided the evening before surgery as instructed        Have you been tested for COVID  No           Have you been told you were positive for COVID No  Have you had any known exposure to someone that is positive for COVID No  Do you have a cough                   No              Do you have shortness of breath No                 Do you have a sore throat            No                Are you having chills                    No                Are you having muscle aches. No                    Please come to the hospital wearing a mask and have your significant other wear a mask as well. Both of you should check your temperature before leaving to come here,  if it is 100 or higher please call [phone] for instruction.

## 2021-01-15 ENCOUNTER — HOSPITAL ENCOUNTER (OUTPATIENT)
Age: 56
Discharge: HOME | End: 2021-01-17
Payer: COMMERCIAL

## 2021-01-15 DIAGNOSIS — Z01.818 PREOP TESTING: ICD-10-CM

## 2021-01-15 PROCEDURE — U0003 INFECTIOUS AGENT DETECTION BY NUCLEIC ACID (DNA OR RNA); SEVERE ACUTE RESPIRATORY SYNDROME CORONAVIRUS 2 (SARS-COV-2) (CORONAVIRUS DISEASE [COVID-19]), AMPLIFIED PROBE TECHNIQUE, MAKING USE OF HIGH THROUGHPUT TECHNOLOGIES AS DESCRIBED BY CMS-2020-01-R: HCPCS

## 2021-01-15 NOTE — H&P
[id number] [facility]                       PREOPERATIVE HISTORY AND PHYSICAL    PATIENT NAME: ELLIE TONY SR. :        1965  MED REC NO:   [id number]                            ROOM:  ACCOUNT NO:   [de-identified]                           ADMIT DATE: 2021  PROVIDER:     Joann Post MD    CHIEF COMPLAINT:  Screening. HISTORY OF PRESENT ILLNESS:  This is a 79-year-old gentleman referred by  Dr. Vaughn Winchester. The patient denies any family history of  colon cancer. No significant symptomatology. Does not remember last  and never had a previous endoscopy. PREVIOUS SURGERIES:  Pacemaker placement, Dr. Oliver Rose. ALLERGIES:  No allergies. MEDICATIONS:  The patient is on spironolactone, carvedilol, amlodipine,  metformin, glipizide, furosemide, Entresto. REVIEW OF SYSTEMS:  Positive for high blood pressure. No other  cardiopulmonary problems. No genitourinary problems. No bleeding  dyscrasias. No endocrinological problems. No heart, joint replacement  or valve replacement. No evidence of hepatitis in the past.    FAMILY HISTORY:  Noncontributory. SOCIAL HISTORY:  No smoking. No coffee. No alcohol. No diet  restrictions. PHYSICAL EXAMINATION:  GENERAL:  A 50year-old, oriented x3. HEENT:  Within normal limits. NECK:  Supple without masses. CHEST:  Clear. CARDIAC:  Normal sinus rhythm. ABDOMEN:  Soft and nontender. No hepatosplenomegaly. EXTREMITIES:  Within normal limits. SKIN:  No suspicious skin lesions. RECTAL:  Deferred. IMPRESSION:  Screening. PLAN:  Colonoscopy. The patient was told of all risks, benefits, and  alternatives, has agreed to proceed.         Attila Poon MD    D: 2021 10:11:17       T: 2021 10:48:25     [email or web address]  Job#: [id number]     Doc#: [id number]

## 2021-01-16 LAB
SARS-COV-2: NOT DETECTED
SOURCE: NORMAL

## 2021-01-19 ENCOUNTER — ANESTHESIA EVENT (OUTPATIENT)
Dept: ENDOSCOPY | Age: 56
End: 2021-01-19
Payer: COMMERCIAL

## 2021-01-20 ENCOUNTER — HOSPITAL ENCOUNTER (OUTPATIENT)
Age: 56
Setting detail: OUTPATIENT SURGERY
Discharge: HOME | End: 2021-01-20
Attending: SURGERY | Admitting: SURGERY
Payer: COMMERCIAL

## 2021-01-20 ENCOUNTER — ANESTHESIA (OUTPATIENT)
Dept: ENDOSCOPY | Age: 56
End: 2021-01-20
Payer: COMMERCIAL

## 2021-01-20 VITALS
TEMPERATURE: 97.1 F | BODY MASS INDEX: 29.16 KG/M2 | OXYGEN SATURATION: 92 % | SYSTOLIC BLOOD PRESSURE: 128 MMHG | WEIGHT: 220 LBS | DIASTOLIC BLOOD PRESSURE: 71 MMHG | RESPIRATION RATE: 20 BRPM | HEART RATE: 78 BPM | HEIGHT: 73 IN

## 2021-01-20 VITALS — OXYGEN SATURATION: 91 % | DIASTOLIC BLOOD PRESSURE: 110 MMHG | SYSTOLIC BLOOD PRESSURE: 183 MMHG

## 2021-01-20 DIAGNOSIS — Z01.818 PREOP TESTING: Primary | ICD-10-CM

## 2021-01-20 LAB — METER GLUCOSE: 102 MG/DL (ref 74–99)

## 2021-01-20 PROCEDURE — 7100000011 HC PHASE II RECOVERY - ADDTL 15 MIN: Performed by: SURGERY

## 2021-01-20 PROCEDURE — 82962 GLUCOSE BLOOD TEST: CPT

## 2021-01-20 PROCEDURE — 2709999900 HC NON-CHARGEABLE SUPPLY: Performed by: SURGERY

## 2021-01-20 PROCEDURE — 3700000001 HC ADD 15 MINUTES (ANESTHESIA): Performed by: SURGERY

## 2021-01-20 PROCEDURE — 6360000002 HC RX W HCPCS: Performed by: NURSE ANESTHETIST, CERTIFIED REGISTERED

## 2021-01-20 PROCEDURE — 3700000000 HC ANESTHESIA ATTENDED CARE: Performed by: SURGERY

## 2021-01-20 PROCEDURE — 7100000010 HC PHASE II RECOVERY - FIRST 15 MIN: Performed by: SURGERY

## 2021-01-20 PROCEDURE — 3609010300 HC COLONOSCOPY W/BIOPSY SINGLE/MULTIPLE: Performed by: SURGERY

## 2021-01-20 PROCEDURE — 2580000003 HC RX 258: Performed by: SURGERY

## 2021-01-20 PROCEDURE — 88305 TISSUE EXAM BY PATHOLOGIST: CPT

## 2021-01-20 RX ORDER — PROMETHAZINE HYDROCHLORIDE 25 MG/ML
6.25 INJECTION, SOLUTION INTRAMUSCULAR; INTRAVENOUS
Status: DISCONTINUED | OUTPATIENT
Start: 2021-01-20 | End: 2021-01-20 | Stop reason: HOSPADM

## 2021-01-20 RX ORDER — OXYCODONE HYDROCHLORIDE AND ACETAMINOPHEN 5; 325 MG/1; MG/1
1 TABLET ORAL
Status: DISCONTINUED | OUTPATIENT
Start: 2021-01-20 | End: 2021-01-20 | Stop reason: HOSPADM

## 2021-01-20 RX ORDER — SODIUM CHLORIDE 9 MG/ML
INJECTION, SOLUTION INTRAVENOUS CONTINUOUS
Status: DISCONTINUED | OUTPATIENT
Start: 2021-01-20 | End: 2021-01-20 | Stop reason: HOSPADM

## 2021-01-20 RX ORDER — DIPHENHYDRAMINE HYDROCHLORIDE 50 MG/ML
12.5 INJECTION INTRAMUSCULAR; INTRAVENOUS
Status: DISCONTINUED | OUTPATIENT
Start: 2021-01-20 | End: 2021-01-20 | Stop reason: HOSPADM

## 2021-01-20 RX ORDER — PROPOFOL 10 MG/ML
INJECTION, EMULSION INTRAVENOUS PRN
Status: DISCONTINUED | OUTPATIENT
Start: 2021-01-20 | End: 2021-01-20 | Stop reason: SDUPTHER

## 2021-01-20 RX ORDER — MEPERIDINE HYDROCHLORIDE 25 MG/ML
12.5 INJECTION INTRAMUSCULAR; INTRAVENOUS; SUBCUTANEOUS EVERY 5 MIN PRN
Status: DISCONTINUED | OUTPATIENT
Start: 2021-01-20 | End: 2021-01-20 | Stop reason: HOSPADM

## 2021-01-20 RX ORDER — FENTANYL CITRATE 50 UG/ML
25 INJECTION, SOLUTION INTRAMUSCULAR; INTRAVENOUS EVERY 5 MIN PRN
Status: DISCONTINUED | OUTPATIENT
Start: 2021-01-20 | End: 2021-01-20 | Stop reason: HOSPADM

## 2021-01-20 RX ORDER — HYDRALAZINE HYDROCHLORIDE 20 MG/ML
INJECTION INTRAMUSCULAR; INTRAVENOUS PRN
Status: DISCONTINUED | OUTPATIENT
Start: 2021-01-20 | End: 2021-01-20 | Stop reason: SDUPTHER

## 2021-01-20 RX ORDER — FENTANYL CITRATE 50 UG/ML
50 INJECTION, SOLUTION INTRAMUSCULAR; INTRAVENOUS EVERY 5 MIN PRN
Status: DISCONTINUED | OUTPATIENT
Start: 2021-01-20 | End: 2021-01-20 | Stop reason: HOSPADM

## 2021-01-20 RX ADMIN — SODIUM CHLORIDE: 9 INJECTION, SOLUTION INTRAVENOUS at 06:34

## 2021-01-20 RX ADMIN — PROPOFOL 300 MG: 10 INJECTION, EMULSION INTRAVENOUS at 07:28

## 2021-01-20 RX ADMIN — HYDRALAZINE HYDROCHLORIDE 5 MG: 20 INJECTION INTRAMUSCULAR; INTRAVENOUS at 07:44

## 2021-01-20 RX ADMIN — HYDRALAZINE HYDROCHLORIDE 5 MG: 20 INJECTION INTRAMUSCULAR; INTRAVENOUS at 07:40

## 2021-01-20 RX ADMIN — HYDRALAZINE HYDROCHLORIDE 5 MG: 20 INJECTION INTRAMUSCULAR; INTRAVENOUS at 07:42

## 2021-01-20 RX ADMIN — HYDRALAZINE HYDROCHLORIDE 5 MG: 20 INJECTION INTRAMUSCULAR; INTRAVENOUS at 07:37

## 2021-01-20 NOTE — ANESTHESIA POSTPROCEDURE EVALUATION
Department of Anesthesiology  Postprocedure Note    Patient: Angel Persaud MRN: [id number]  YOB: 1965  Date of evaluation: 1/20/2021  Time:  10:12 AM     Procedure Summary     Date: 01/20/21 Room / Location: [address] [place] [address]    Anesthesia Start: 0725 Anesthesia Stop: 1978    Procedure: COLONOSCOPY WITH BIOPSY (N/A ) Diagnosis: (SCREENING)    Surgeons: Bernardino Story MD Responsible Provider: Santana Galvan MD    Anesthesia Type: MAC ASA Status: 4          Anesthesia Type: MAC    Patricia Phase I: Patricia Score: 10    Patricia Phase II: Patricia Score: 10    Last vitals: Reviewed and per EMR flowsheets.        Anesthesia Post Evaluation    Patient location during evaluation: PACU  Patient participation: complete - patient participated  Level of consciousness: awake  Airway patency: patent  Nausea & Vomiting: no vomiting and no nausea  Complications: no  Cardiovascular status: hemodynamically stable  Respiratory status: acceptable  Hydration status: stable

## 2021-01-20 NOTE — OP NOTE
[address] [address]                                OPERATIVE REPORT    PATIENT NAME: Sakshi Shin                    :        1965  MED REC NO:   [id number]                            ROOM:  ACCOUNT NO:   [de-identified]                           ADMIT DATE: 2021  PROVIDER:     Candace Strickland MD    DATE OF PROCEDURE:  2021    PREOPERATIVE DIAGNOSIS:  Screening. POSTOPERATIVE DIAGNOSIS:  Small sessile polyp just above the cecal  region. No other pathology noted at this time. DESCRIPTION OF PROCEDURE: The patient placed in left lateral Epperson'  position. A digital examination revealed no masses. Scope was  introduced gradually and progressed under [facility], taken all the way around  to where cecal region was identified. On removal, just above the cecal  region, a small sessile polyp was identified. It was removed piecemeal  with a cold forceps, completely removed. Had some difficulty with snare  passing through the endoscope at this point in time. Eventually, the  polyp was removed. The scope was withdrawn. There was no evidence of  any other pathology noted at this time. No diverticula. No other  polyps. Anorectal region within normal limits. The patient tolerated  the procedure well    FINDINGS:  As mentioned, polyp just above the cecal region, removed with  cold forceps. No other pathology.         Jeet Hopkins MD    D: 2021 7:48:00       T: 2021 7:54:27     [email or web address]  Job#: [id number]     Doc#: [id number]    CC:  MD Lj Caputo MD

## 2021-01-20 NOTE — ANESTHESIA PRE PROCEDURE
Department of Anesthesiology  Preprocedure Note       Name:  Ivan Alamo.   Age:  50 y.o.  :  1965                                          MRN:  [id number]         Date:  2021      Surgeon: Abhishek Brandon):  Yamile Banks MD    Procedure: Procedure(s):  COLORECTAL CANCER SCREENING, NOT HIGH RISK    Medications prior to admission:   Prior to Admission medications    Medication Sig Start Date End Date Taking? Authorizing Provider   sacubitril-valsartan (ENTRESTO) 24-26 MG per tablet Take 1 tablet by mouth 2 times daily 20  Yes Melinda Leyva MD   furosemide (LASIX) 20 MG tablet Take 20 mg by mouth daily  19  Yes Historical Provider, MD   hydrALAZINE (APRESOLINE) 100 MG tablet Take 1 tablet by mouth 3 times daily 19  Yes Mercedes Childs MD   glimepiride (AMARYL) 2 MG tablet Take 2 mg by mouth every morning (before breakfast)   Yes Historical Provider, MD   aspirin 81 MG tablet Take 81 mg by mouth daily   Yes Historical Provider, MD   carvedilol (COREG) 25 MG tablet Take 1 tablet by mouth 2 times daily (with meals).  14  Yes Diya Villalta MD   spironolactone (ALDACTONE) 25 MG tablet Take 25 mg by mouth daily    Yes Historical Provider, MD       Current medications:    Current Facility-Administered Medications   Medication Dose Route Frequency Provider Last Rate Last Admin    0.9 % sodium chloride infusion   Intravenous Continuous Yamile Banks MD           Allergies:  No Known Allergies    Problem List:    Patient Active Problem List   Diagnosis Code    CHF (congestive heart failure) ([facility] I50.9    Chest pain R07.9    S/P ICD (internal cardiac defibrillator) procedure Z95.810    Dilated cardiomyopathy ([facility] [facility] I42.0    ICD (implantable cardioverter-defibrillator) malfunction, subsequent encounter T82.118D    Essential hypertension I10       Past Medical History:        Diagnosis Date    [facility] 2017    no residual  CHF (congestive heart failure) (HCC)     Colon cancer screening     Dilated cardiomyopathy ([facility] [address]     Follows with Dr. Christian Mclean     Hypertension     Nonrheumatic mitral (valve) insufficiency     Nonrheumatic tricuspid (valve) insufficiency        Past Surgical History:        Procedure Laterality Date    CARDIAC DEFIBRILLATOR PLACEMENT Left 10/10/2017    Medtronic Dual ICD,     PACEMAKER PLACEMENT      TONSILLECTOMY         Social History:    Social History     Tobacco Use    Smoking status: Never Smoker    Smokeless tobacco: Never Used   Substance Use Topics    Alcohol use: Not Currently     Comment: Quit 12/16                                Counseling given: Not Answered      Vital Signs (Current):   Vitals:    12/10/20 0924 01/14/21 1431 01/20/21 0659   BP:   (!) 186/108   Pulse:   82   Resp:   18   Temp:   97.1 °F (36.2 °C)   TempSrc:   Temporal   SpO2:   93%   Weight: 220 lb (99.8 kg) 220 lb (99.8 kg) 220 lb (99.8 kg)   Height: 6' 1\" (1.854 m) 6' 1\" (1.854 m) 6' 1\" (1.854 m)                                              BP Readings from Last 3 Encounters:   01/20/21 (!) 186/108   11/05/20 (!) 142/94   04/13/20 (!) 144/90       NPO Status: Time of last liquid consumption: 2100                        Time of last solid consumption: 1800                        Date of last liquid consumption: 01/19/21                        Date of last solid food consumption: 01/18/21    BMI:   Wt Readings from Last 3 Encounters:   01/20/21 220 lb (99.8 kg)   11/05/20 226 lb (102.5 kg)   04/13/20 229 lb (103.9 kg)     Body mass index is 29.03 kg/m².     CBC:   Lab Results   Component Value Date    WBC 6.0 04/13/2020    RBC 4.68 04/13/2020    HGB 13.7 04/13/2020    HCT 40.6 04/13/2020    MCV 86.8 04/13/2020    RDW 13.7 04/13/2020     04/13/2020       CMP:   Lab Results   Component Value Date     04/13/2020    K 3.6 04/13/2020     04/13/2020    CO2 25 04/13/2020 BUN 18 04/13/2020    CREATININE 1.6 04/13/2020    GFRAA 55 04/13/2020    LABGLOM 55 04/13/2020    GLUCOSE 148 04/13/2020    GLUCOSE 280 01/06/2012    PROT 7.8 07/15/2019    CALCIUM 9.1 04/13/2020    BILITOT 2.2 07/15/2019    ALKPHOS 72 07/15/2019    AST 12 07/15/2019    ALT 8 07/15/2019       POC Tests: No results for input(s): POCGLU, POCNA, POCK, POCCL, POCBUN, POCHEMO, POCHCT in the last 72 hours. Coags:   Lab Results   Component Value Date    PROTIME 12.5 11/04/2019    PROTIME 11.8 01/06/2012    INR 1.1 11/04/2019    APTT 32.2 11/04/2019       HCG (If Applicable): No results found for: PREGTESTUR, PREGSERUM, HCG, HCGQUANT     ABGs: No results found for: PHART, PO2ART, JLD7CGU, JZN5DIT, BEART, B2QGDFVL     Type & Screen (If Applicable):  No results found for: LABABO, LABRH    Drug/Infectious Status (If Applicable):  No results found for: HIV, HEPCAB    COVID-19 Screening (If Applicable):   Lab Results   Component Value Date    COVID19 Not Detected 01/15/2021         Anesthesia Evaluation  Patient summary reviewed no history of anesthetic complications:   Airway: Mallampati: II  TM distance: >3 FB     Mouth opening: > = 3 FB Dental:          Pulmonary:Negative Pulmonary ROS breath sounds clear to auscultation                             Cardiovascular:    (+) hypertension:, pacemaker: AICD, CHF:,         Rhythm: regular  Rate: normal                 ROS comment: Dilated cardiomyopathy     Neuro/Psych:   (+) TIA (3 years ago;  no residual weakness),    (-) CVA           GI/Hepatic/Renal:   (+) bowel prep,           Endo/Other:    (+) Diabetes, . Abdominal:           Vascular: negative vascular ROS. Anesthesia Plan      MAC     ASA 4     (Backup GA if needed)  Induction: intravenous. Anesthetic plan and risks discussed with patient. Plan discussed with CRNA.                   Love Brown MD   1/20/2021

## 2021-02-18 ENCOUNTER — TELEPHONE (OUTPATIENT)
Dept: CARDIOLOGY CLINIC | Age: 56
End: 2021-02-18

## 2021-02-18 NOTE — TELEPHONE ENCOUNTER
Amy Loving from [facility] Patient Assistance called. They need us to start a prior auth for patient's Entresto 24/26 mg tabs.  before they can consider

## 2021-06-14 NOTE — PROGRESS NOTES
Cardiology Progress Note  Dr. Lelia Donis      Referring Physician: Dr. Norman Goldmann:   Chief Complaint   Patient presents with    Congestive Heart Failure     6m FU - pt has no complaints     HISTORY OF PRESENT ILLNESS:   Patient is a 50year old male with significant past medical history of nonischemic cardiomyopathy, AICD in situ, hypertension, diabetes mellitus, is here for follow-up appointment. Patient denies any chest pain, no shortness of breath, no lightheadedness, no dizziness, no palpitations, no pedal edema, no PND, no orthopnea, no syncope, no presyncopal episodes. Denies noncompliance with medications and dietary restriction        Past Medical History:   Diagnosis Date    Cerebral artery occlusion with cerebral infarction ([facility] 2017    no residual    CHF (congestive heart failure) ([facility]     Colon cancer screening     Dilated cardiomyopathy ([facility]     Follows with Dr. Carlos Naqvi     Hypertension     Nonrheumatic mitral (valve) insufficiency     Nonrheumatic tricuspid (valve) insufficiency          Past Surgical History:   Procedure Laterality Date    CARDIAC DEFIBRILLATOR PLACEMENT Left 10/10/2017    Medtronic Dual ICD,     COLONOSCOPY N/A 1/20/2021    COLONOSCOPY WITH BIOPSY performed by Dori Alvarez MD at [address] [facility]           Current Outpatient Medications   Medication Sig Dispense Refill    sacubitril-valsartan (ENTRESTO) 24-26 MG per tablet Take 1 tablet by mouth 2 times daily 180 tablet 3    furosemide (LASIX) 20 MG tablet Take 20 mg by mouth daily       hydrALAZINE (APRESOLINE) 100 MG tablet Take 1 tablet by mouth 3 times daily 60 tablet 3    glimepiride (AMARYL) 2 MG tablet Take 2 mg by mouth every morning (before breakfast)      aspirin 81 MG tablet Take 81 mg by mouth daily      carvedilol (COREG) 25 MG tablet Take 1 tablet by mouth 2 times daily (with meals).  60 tablet 0    spironolactone (ALDACTONE) 25 MG tablet Take 25 mg by mouth daily        No current facility-administered medications for this visit. Allergies as of 06/15/2021    (No Known Allergies)       Social History     Socioeconomic History    Marital status:      Spouse name: Not on file    Number of children: Not on file    Years of education: Not on file    Highest education level: Not on file   Occupational History    Not on file   Tobacco Use    Smoking status: Never Smoker    Smokeless tobacco: Never Used   Vaping Use    Vaping Use: Never used   Substance and Sexual Activity    Alcohol use: Not Currently     Comment: Quit 12/16    Drug use: Yes     Frequency: 7.0 times per week     Types: Marijuana     Comment: daily    Sexual activity: Not on file   Other Topics Concern    Not on file   Social History Narrative    Denies caffeine. Social Determinants of Health     Financial Resource Strain:     Difficulty of Paying Living Expenses:    Food Insecurity:     Worried About Running Out of Food in the Last Year:     [address] in the Last Year:    Transportation Needs:     Lack of Transportation (Medical):      Lack of Transportation (Non-Medical):    Physical Activity:     Days of Exercise per Week:     Minutes of Exercise per Session:    Stress:     Feeling of Stress :    Social Connections:     Frequency of Communication with Friends and Family:     Frequency of Social Gatherings with Friends and Family:     Attends [employer] Services:     Active Member of Clubs or Organizations:     Attends Club or Organization Meetings:     Marital Status:    Intimate Partner Violence:     Fear of Current or Ex-Partner:     Emotionally Abused:     Physically Abused:     Sexually Abused:      No family history for early CAD    REVIEW OF SYSTEMS:     CONSTITUTIONAL:  negative for  fevers, chills, sweats and fatigue  HEENT:  negative for  tinnitus, earaches, nasal congestion and epistaxis  RESPIRATORY:  negative for  dry cough, cough with sputum, dyspnea, wheezing and hemoptysis  GASTROINTESTINAL:  negative for nausea, vomiting, diarrhea, constipation, pruritus and jaundice  HEMATOLOGIC/LYMPHATIC:  negative for easy bruising, bleeding, lymphadenopathy and petechiae  ENDOCRINE:  negative for heat intolerance, cold intolerance, tremor, hair loss and diabetic symptoms including neither polyuria nor polydipsia nor blurred vision  MUSCULOSKELETAL:  negative for  myalgias, arthralgias, joint swelling, stiff joints and decreased range of motion  NEUROLOGICAL:  negative for memory problems, speech problems, visual disturbance, dysphagia, weakness and numbness      PHYSICAL EXAM:   Constitutional:  Awake, alert cooperative, no apparant distress, and appears stated age. HEENT:  Moist and pink mucous membranes, normocephalic, without obvious abnormality, atraumatic, normal ears and nose. Neck:  Supple, symmetrical, trachea midline, no JVD, no adenopathy, thyroid symmetric, not enlarged and no tenderness, good carotid upstroke bilaterally, no carotid bruit, skin normal  Lungs: No increased work of breathing, good air exchange, clear to ascultation bilaterally, no crackles or wheezing. Cardiovascular: Laterally displaced PMI, regular rate and rhythm, normal S1 and S2, no S3 or S4, 3/6 systolic murmur at the apex, 3/6 systolic murmur at the left lower sternal border, no pedal edema, good carotid upstroke bilaterally, no carotid bruit, no JVD, no adbominal pulsating masses. Abdomen: Soft, nontender, no hepatomegaly, no splenomegaly, bowel sound positive. CHEST:  Expands symmetrically, nontender to palpation, has LifeVest on. Musculoskeletal:  No clubbing or cyanosis. No redness, warmth, or swelling of the joints. Neurological: Alert, awake, and oriented X3     Blood pressure (!) 162/80, pulse 72, resp. rate 19, height 6' (1.829 m), weight 220 lb (99.8 kg), SpO2 98 %.   Repeated blood pressure was 140/85    DATA:  I personally reviewed the admission EKG with the following interpretation: Sinus rhythm, possible left atrial enlargement, nonspecific intraventricular conduction delay, LVH with repolarization abnormalities    EKG 11/5/20 Sinus rhythm, possible left atrial enlargement , nonspecific intraventricular conduction delays, T wave changes in the lateral leads    ECHO: 7/16/19   Left ventricle severely dilated. Global hypokinesis is noted to be severe. Moderate left ventricular concentric hypertrophy noted. Estimated left ventricular ejection fraction is 20-25±5%. Diastolic function cannot be accurately assessed due to significant mitral   regurgitation. The LAESV Index is >34 ml/m2. Defibrillator wire visualized in right ventricle   Increased E point septal separation noted,suggesting decreased LV cardiac   output. Delayed closure of mitral valve suggesting increased end diastolic   pressure. Moderate to severe mitral regurgitation is present. Physiologic and/or trace tricuspid regurgitation. RVSP is 47 mmHg. Pulmonary hypertension is mild . Physiologic and/or trace pulmonic regurgitation present. Technically good quality study. Technically difficult study due to patient''s body habitus. Compared to prior echo, changes noted. Suggest clinical correlation. Stress Test: 12/13/16   Limited visualization of the inferior wall left ventricular myocardium   due to some diaphragmatic activity and attenuation artifact. Remaining   left ventricular myocardium shows no areas of reversibility to suggest   focal ischemia. There is global left ventricular hypokinesis on gated   imaging. Left ventricle is dilated with end-diastolic volume of 177   mL. Ejection fraction calculated at 22%.        Angiography:   Cardiology Labs:   BMP:    Lab Results   Component Value Date     04/13/2020    K 3.6 04/13/2020     04/13/2020    CO2 25 04/13/2020    BUN 18 04/13/2020     CMP: Follow-up with Dr. Edda Caro in 6 months, sooner if symptomatic for any reason    I have reviewed my findings and recommendations with patient     Electronically signed by Kandy Howe MD on 6/15/2021 at 10:13 AM  NOTE: This report was transcribed using voice recognition software.  Every effort was made to ensure accuracy; however, inadvertent computerized transcription errors may be present

## 2021-06-15 ENCOUNTER — OFFICE VISIT (OUTPATIENT)
Dept: CARDIOLOGY CLINIC | Age: 56
End: 2021-06-15
Payer: COMMERCIAL

## 2021-06-15 VITALS
SYSTOLIC BLOOD PRESSURE: 162 MMHG | OXYGEN SATURATION: 98 % | HEIGHT: 72 IN | RESPIRATION RATE: 19 BRPM | BODY MASS INDEX: 29.8 KG/M2 | WEIGHT: 220 LBS | HEART RATE: 72 BPM | DIASTOLIC BLOOD PRESSURE: 80 MMHG

## 2021-06-15 DIAGNOSIS — I10 HYPERTENSION, UNSPECIFIED TYPE: ICD-10-CM

## 2021-06-15 DIAGNOSIS — I50.40 COMBINED SYSTOLIC AND DIASTOLIC CONGESTIVE HEART FAILURE, UNSPECIFIED HF CHRONICITY (HCC): Primary | ICD-10-CM

## 2021-06-15 PROCEDURE — 3017F COLORECTAL CA SCREEN DOC REV: CPT | Performed by: INTERNAL MEDICINE

## 2021-06-15 PROCEDURE — 99214 OFFICE O/P EST MOD 30 MIN: CPT | Performed by: INTERNAL MEDICINE

## 2021-06-15 PROCEDURE — 93000 ELECTROCARDIOGRAM COMPLETE: CPT | Performed by: INTERNAL MEDICINE

## 2021-06-15 PROCEDURE — G8417 CALC BMI ABV UP PARAM F/U: HCPCS | Performed by: INTERNAL MEDICINE

## 2021-06-15 PROCEDURE — 1036F TOBACCO NON-USER: CPT | Performed by: INTERNAL MEDICINE

## 2021-06-15 PROCEDURE — G8427 DOCREV CUR MEDS BY ELIG CLIN: HCPCS | Performed by: INTERNAL MEDICINE

## 2022-03-16 NOTE — PROGRESS NOTES
Cardiology Progress Note  Dr. Kaushik Osorio      Referring Physician: Dr. Arianna Meredith:   Chief Complaint   Patient presents with    Cardiomyopathy     9 month ov. Is c/o SOBOE. No other complaints. Follows with Dr Brenden Bui; no discharges.  Hypertension    Diabetes    Shortness of Breath     HISTORY OF PRESENT ILLNESS:   Patient is a 49year old male with significant past medical history of nonischemic cardiomyopathy, AICD in situ, hypertension, diabetes mellitus, is here for follow-up appointment. Shortness of breath is at baseline, patient denies any chest pain, no lightheadedness, no dizziness, no palpitations, no pedal edema, no PND, no orthopnea, no syncope, no presyncopal episodes.     Denies noncompliance with medications and dietary restriction    Past Medical History:   Diagnosis Date    Cerebral artery occlusion with cerebral infarction ([facility] 2017    no residual    CHF (congestive heart failure) ([facility]     Colon cancer screening     Dilated cardiomyopathy ([facility] [facility]     Follows with Dr. Coni Leary     Hypertension     Nonrheumatic mitral (valve) insufficiency     Nonrheumatic tricuspid (valve) insufficiency          Past Surgical History:   Procedure Laterality Date    CARDIAC DEFIBRILLATOR PLACEMENT Left 10/10/2017    [employer] Dual ICD,     COLONOSCOPY N/A 1/20/2021    COLONOSCOPY WITH BIOPSY performed by Oliver Layton MD at [address] [facility]           Current Outpatient Medications   Medication Sig Dispense Refill    sacubitril-valsartan (ENTRESTO) 24-26 MG per tablet Take 1 tablet by mouth 2 times daily 180 tablet 3    furosemide (LASIX) 20 MG tablet Take 20 mg by mouth daily       hydrALAZINE (APRESOLINE) 100 MG tablet Take 1 tablet by mouth 3 times daily 60 tablet 3    glimepiride (AMARYL) 2 MG tablet Take 2 mg by mouth every morning (before breakfast)      aspirin 81 MG tablet Take 81 mg by mouth daily      carvedilol (COREG) 25 MG tablet Take 1 tablet by mouth 2 times daily (with meals). 60 tablet 0    spironolactone (ALDACTONE) 25 MG tablet Take 25 mg by mouth daily       Blood Pressure Monitoring (SPHYGMOMANOMETER) MISC 1 Units by Does not apply route 2 times daily (Patient not taking: Reported on 3/17/2022) 1 each 0     No current facility-administered medications for this visit. Allergies as of 03/17/2022    (No Known Allergies)       Social History     Socioeconomic History    Marital status:      Spouse name: Not on file    Number of children: Not on file    Years of education: Not on file    Highest education level: Not on file   Occupational History    Not on file   Tobacco Use    Smoking status: Never Smoker    Smokeless tobacco: Never Used   Vaping Use    Vaping Use: Never used   Substance and Sexual Activity    Alcohol use: Not Currently     Comment: Quit 12/16    Drug use: Yes     Frequency: 7.0 times per week     Types: Marijuana Thurl Cipro)    Sexual activity: Not on file   Other Topics Concern    Not on file   Social History Narrative    Denies caffeine. Social Determinants of Health     Financial Resource Strain:     Difficulty of Paying Living Expenses: Not on file   Food Insecurity:     Worried About Running Out of Food in the Last Year: Not on file    Stoney of Food in the Last Year: Not on file   Transportation Needs:     Lack of Transportation (Medical): Not on file    Lack of Transportation (Non-Medical):  Not on file   Physical Activity:     Days of Exercise per Week: Not on file    Minutes of Exercise per Session: Not on file   Stress:     Feeling of Stress : Not on file   Social Connections:     Frequency of Communication with Friends and Family: Not on file    Frequency of Social Gatherings with Friends and Family: Not on file    Attends Baptism Services: Not on file    Active Member of Clubs or Organizations: Not on file   [facility] Attends Club or Organization Meetings: Not on file    Marital Status: Not on file   Intimate Partner Violence:     Fear of Current or Ex-Partner: Not on file    Emotionally Abused: Not on file    Physically Abused: Not on file    Sexually Abused: Not on file   Housing Stability:     Unable to Pay for Housing in the Last Year: Not on file    Number of [place] in the Last Year: Not on file    Unstable Housing in the Last Year: Not on file     No family history for early CAD    REVIEW OF SYSTEMS:   CONSTITUTIONAL:  negative for  fevers, chills, sweats and fatigue  HEENT:  negative for  tinnitus, earaches, nasal congestion and epistaxis  RESPIRATORY:  negative for  dry cough, cough with sputum, dyspnea, wheezing and hemoptysis  GASTROINTESTINAL:  negative for nausea, vomiting, diarrhea, constipation, pruritus and jaundice  HEMATOLOGIC/LYMPHATIC:  negative for easy bruising, bleeding, lymphadenopathy and petechiae  ENDOCRINE:  negative for heat intolerance, cold intolerance, tremor, hair loss and diabetic symptoms including neither polyuria nor polydipsia nor blurred vision  MUSCULOSKELETAL:  negative for  myalgias, arthralgias, joint swelling, stiff joints and decreased range of motion  NEUROLOGICAL:  negative for memory problems, speech problems, visual disturbance, dysphagia, weakness and numbness      PHYSICAL EXAM:   Constitutional:  Awake, alert cooperative, no apparant distress, and appears stated age. HEENT:  Moist and pink mucous membranes, normocephalic, without obvious abnormality, atraumatic, normal ears and nose. Neck:  Supple, symmetrical, trachea midline, no JVD, no adenopathy, thyroid symmetric, not enlarged and no tenderness, good carotid upstroke bilaterally, no carotid bruit, skin normal  Lungs: No increased work of breathing, good air exchange, clear to ascultation bilaterally, no crackles or wheezing.   Cardiovascular: Laterally displaced PMI, regular rate and rhythm, normal S1 and S2, no S3 or S4, 3/6 systolic murmur at the apex, 3/6 systolic murmur at the left lower sternal border, no pedal edema, good carotid upstroke bilaterally, no carotid bruit, no JVD, no adbominal pulsating masses. Abdomen: Soft, nontender, no hepatomegaly, no splenomegaly, bowel sound positive. CHEST:  Expands symmetrically, nontender to palpation, has LifeVest on. Musculoskeletal:  No clubbing or cyanosis. No redness, warmth, or swelling of the joints. Neurological: Alert, awake, and oriented X3     Blood pressure 132/70, pulse 71, resp. rate 16, height 6' (1.829 m), weight 229 lb 3.2 oz (104 kg), SpO2 98 %. DATA:  I personally reviewed the admission EKG with the following interpretation: Sinus rhythm, notes specific intraventricular conduction delay, diffuse ST-T changes    EKG 6/15/21  Sinus rhythm, possible left atrial enlargement, nonspecific intraventricular conduction delay, LVH with repolarization abnormalities    ECHO: 7/16/19   Left ventricle severely dilated. Global hypokinesis is noted to be severe. Moderate left ventricular concentric hypertrophy noted. Estimated left ventricular ejection fraction is 20-25±5%. Diastolic function cannot be accurately assessed due to significant mitral   regurgitation. The LAESV Index is >34 ml/m2. Defibrillator wire visualized in right ventricle   Increased E point septal separation noted,suggesting decreased LV cardiac   output. Delayed closure of mitral valve suggesting increased end diastolic   pressure. Moderate to severe mitral regurgitation is present. Physiologic and/or trace tricuspid regurgitation. RVSP is 47 mmHg. Pulmonary hypertension is mild . Physiologic and/or trace pulmonic regurgitation present. Technically good quality study. Technically difficult study due to patient''s body habitus. Compared to prior echo, changes noted. Suggest clinical correlation.      Stress Test: 12/13/16   Limited visualization of the inferior wall left ventricular myocardium   due to some diaphragmatic activity and attenuation artifact. Remaining   left ventricular myocardium shows no areas of reversibility to suggest   focal ischemia. There is global left ventricular hypokinesis on gated   imaging. Left ventricle is dilated with end-diastolic volume of 599   mL. Ejection fraction calculated at 22%. Angiography:   Cardiology Labs:   BMP:    Lab Results   Component Value Date     04/13/2020    K 3.6 04/13/2020     04/13/2020    CO2 25 04/13/2020    BUN 18 04/13/2020     CMP:    Lab Results   Component Value Date     04/13/2020    K 3.6 04/13/2020     04/13/2020    CO2 25 04/13/2020    BUN 18 04/13/2020    PROT 7.8 07/15/2019     CBC:    Lab Results   Component Value Date    WBC 6.0 04/13/2020    RBC 4.68 04/13/2020    HGB 13.7 04/13/2020    HCT 40.6 04/13/2020    MCV 86.8 04/13/2020    RDW 13.7 04/13/2020     04/13/2020     PT/INR:  No results found for: PTINR  PT/INR Warfarin:  No components found for: PTPATWAR, PTINRWAR  PTT:    Lab Results   Component Value Date    APTT 32.2 11/04/2019     PTT Heparin:  No components found for: APTTHEP  Magnesium:    Lab Results   Component Value Date    MG 1.9 11/04/2019     TSH:    Lab Results   Component Value Date    TSH 1.783 12/02/2010     TROPONIN:  No components found for: TROP  BNP:  No results found for: BNP  FASTING LIPID PANEL:    Lab Results   Component Value Date    CHOL 263 01/05/2012    HDL 47.0 01/05/2012    TRIG 161 01/05/2012     No orders to display     I have personally reviewed the laboratory, cardiac diagnostic and radiographic testing as outlined above:      IMPRESSION:  1. Dilated nonischemic cardiomyopathy: EF 20-25%, stable we will continue current treatment  2. Congestive heart failure: Systolic, chronic compensated, will continue current treatment. 3. AICD in situ  4. mild to moderate MR  5. Mild TR  6. mild pulmonary HTN  7. HTN: Controlled  8.  Type 2 diabetes mellitus  9. Stage III chronic kidney disease      RECOMMENDATIONS:   1. Continue current treatment  2. CHF: Daily weight, take an extra Lasix for weight gain of more than 2-3 pounds in 24 hours, compliance with diuretics, low-salt diet were all advised. 3.  Preventive cardiology: Low-salt, low-cholesterol diet, daily exercise, were all advised. 4.  Follow-up with Dr. Mills Cockayne as scheduled  5. Follow-up with Dr. Keron Shelby in 6 months, sooner if symptomatic for any reason    I have reviewed my findings and recommendations with patient     Electronically signed by Sherryle Mountain, MD on 3/17/2022 at 6:38 PM  NOTE: This report was transcribed using voice recognition software.  Every effort was made to ensure accuracy; however, inadvertent computerized transcription errors may be present

## 2022-03-17 ENCOUNTER — OFFICE VISIT (OUTPATIENT)
Dept: CARDIOLOGY CLINIC | Age: 57
End: 2022-03-17
Payer: COMMERCIAL

## 2022-03-17 VITALS
BODY MASS INDEX: 31.04 KG/M2 | OXYGEN SATURATION: 98 % | DIASTOLIC BLOOD PRESSURE: 70 MMHG | WEIGHT: 229.2 LBS | HEART RATE: 71 BPM | SYSTOLIC BLOOD PRESSURE: 132 MMHG | RESPIRATION RATE: 16 BRPM | HEIGHT: 72 IN

## 2022-03-17 DIAGNOSIS — I42.8 NICM (NONISCHEMIC CARDIOMYOPATHY) (HCC): Primary | ICD-10-CM

## 2022-03-17 PROCEDURE — G8484 FLU IMMUNIZE NO ADMIN: HCPCS | Performed by: INTERNAL MEDICINE

## 2022-03-17 PROCEDURE — 93000 ELECTROCARDIOGRAM COMPLETE: CPT | Performed by: INTERNAL MEDICINE

## 2022-03-17 PROCEDURE — G8427 DOCREV CUR MEDS BY ELIG CLIN: HCPCS | Performed by: INTERNAL MEDICINE

## 2022-03-17 PROCEDURE — 1036F TOBACCO NON-USER: CPT | Performed by: INTERNAL MEDICINE

## 2022-03-17 PROCEDURE — 3017F COLORECTAL CA SCREEN DOC REV: CPT | Performed by: INTERNAL MEDICINE

## 2022-03-17 PROCEDURE — G8417 CALC BMI ABV UP PARAM F/U: HCPCS | Performed by: INTERNAL MEDICINE

## 2022-03-17 PROCEDURE — 99214 OFFICE O/P EST MOD 30 MIN: CPT | Performed by: INTERNAL MEDICINE

## 2022-11-04 ENCOUNTER — HOSPITAL ENCOUNTER (INPATIENT)
Age: 57
LOS: 1 days | Discharge: HOME | DRG: 871 | End: 2022-11-05
Attending: EMERGENCY MEDICINE | Admitting: INTERNAL MEDICINE
Payer: COMMERCIAL

## 2022-11-04 ENCOUNTER — APPOINTMENT (OUTPATIENT)
Dept: CT IMAGING | Age: 57
DRG: 871 | End: 2022-11-04
Payer: COMMERCIAL

## 2022-11-04 ENCOUNTER — APPOINTMENT (OUTPATIENT)
Dept: GENERAL RADIOLOGY | Age: 57
DRG: 871 | End: 2022-11-04
Payer: COMMERCIAL

## 2022-11-04 DIAGNOSIS — R50.9 FEVER, UNSPECIFIED FEVER CAUSE: ICD-10-CM

## 2022-11-04 DIAGNOSIS — R06.01 ORTHOPNEA: ICD-10-CM

## 2022-11-04 DIAGNOSIS — R09.02 HYPOXIA: Primary | ICD-10-CM

## 2022-11-04 DIAGNOSIS — I50.23 ACUTE ON CHRONIC SYSTOLIC CONGESTIVE HEART FAILURE (HCC): ICD-10-CM

## 2022-11-04 PROBLEM — R06.2 WHEEZING: Status: ACTIVE | Noted: 2022-11-04

## 2022-11-04 PROBLEM — R06.00 DYSPNEA: Status: ACTIVE | Noted: 2022-11-04

## 2022-11-04 LAB
ALBUMIN SERPL-MCNC: 4.4 G/DL (ref 3.5–5.2)
ALP BLD-CCNC: 72 U/L (ref 40–129)
ALT SERPL-CCNC: 15 U/L (ref 0–40)
ANION GAP SERPL CALCULATED.3IONS-SCNC: 15 MMOL/L (ref 7–16)
AST SERPL-CCNC: 18 U/L (ref 0–39)
BASOPHILS ABSOLUTE: 0.02 E9/L (ref 0–0.2)
BASOPHILS RELATIVE PERCENT: 0.3 % (ref 0–2)
BILIRUB SERPL-MCNC: 2.2 MG/DL (ref 0–1.2)
BILIRUBIN URINE: NEGATIVE
BLOOD, URINE: NEGATIVE
BUN BLDV-MCNC: 25 MG/DL (ref 6–20)
CALCIUM SERPL-MCNC: 9.6 MG/DL (ref 8.6–10.2)
CHLORIDE BLD-SCNC: 101 MMOL/L (ref 98–107)
CLARITY: CLEAR
CO2: 26 MMOL/L (ref 22–29)
COLOR: YELLOW
CREAT SERPL-MCNC: 1.7 MG/DL (ref 0.7–1.2)
EOSINOPHILS ABSOLUTE: 0.13 E9/L (ref 0.05–0.5)
EOSINOPHILS RELATIVE PERCENT: 1.9 % (ref 0–6)
GFR SERPL CREATININE-BSD FRML MDRD: 47 ML/MIN/1.73
GLUCOSE BLD-MCNC: 165 MG/DL (ref 74–99)
GLUCOSE URINE: NEGATIVE MG/DL
HCT VFR BLD CALC: 44.4 % (ref 37–54)
HEMOGLOBIN: 14.7 G/DL (ref 12.5–16.5)
IMMATURE GRANULOCYTES #: 0.01 E9/L
IMMATURE GRANULOCYTES %: 0.1 % (ref 0–5)
INFLUENZA A BY PCR: NOT DETECTED
INFLUENZA B BY PCR: NOT DETECTED
KETONES, URINE: NEGATIVE MG/DL
LACTIC ACID: 1.2 MMOL/L (ref 0.5–2.2)
LEUKOCYTE ESTERASE, URINE: NEGATIVE
LYMPHOCYTES ABSOLUTE: 0.78 E9/L (ref 1.5–4)
LYMPHOCYTES RELATIVE PERCENT: 11.2 % (ref 20–42)
MAGNESIUM: 1.9 MG/DL (ref 1.6–2.6)
MCH RBC QN AUTO: 30.2 PG (ref 26–35)
MCHC RBC AUTO-ENTMCNC: 33.1 % (ref 32–34.5)
MCV RBC AUTO: 91.2 FL (ref 80–99.9)
MONOCYTES ABSOLUTE: 0.39 E9/L (ref 0.1–0.95)
MONOCYTES RELATIVE PERCENT: 5.6 % (ref 2–12)
NEUTROPHILS ABSOLUTE: 5.61 E9/L (ref 1.8–7.3)
NEUTROPHILS RELATIVE PERCENT: 80.9 % (ref 43–80)
NITRITE, URINE: NEGATIVE
PDW BLD-RTO: 13.8 FL (ref 11.5–15)
PH UA: 6.5 (ref 5–9)
PLATELET # BLD: 178 E9/L (ref 130–450)
PMV BLD AUTO: 9.9 FL (ref 7–12)
POTASSIUM SERPL-SCNC: 4 MMOL/L (ref 3.5–5)
PROTEIN UA: NEGATIVE MG/DL
RBC # BLD: 4.87 E12/L (ref 3.8–5.8)
SARS-COV-2, NAAT: NOT DETECTED
SODIUM BLD-SCNC: 142 MMOL/L (ref 132–146)
SPECIFIC GRAVITY UA: 1.01 (ref 1–1.03)
TOTAL PROTEIN: 7.2 G/DL (ref 6.4–8.3)
TROPONIN, HIGH SENSITIVITY: 24 NG/L (ref 0–11)
TROPONIN, HIGH SENSITIVITY: 26 NG/L (ref 0–11)
UROBILINOGEN, URINE: 0.2 E.U./DL
WBC # BLD: 6.9 E9/L (ref 4.5–11.5)

## 2022-11-04 PROCEDURE — 96375 TX/PRO/DX INJ NEW DRUG ADDON: CPT

## 2022-11-04 PROCEDURE — 83735 ASSAY OF MAGNESIUM: CPT

## 2022-11-04 PROCEDURE — 6360000004 HC RX CONTRAST MEDICATION: Performed by: RADIOLOGY

## 2022-11-04 PROCEDURE — 87502 INFLUENZA DNA AMP PROBE: CPT

## 2022-11-04 PROCEDURE — 99285 EMERGENCY DEPT VISIT HI MDM: CPT

## 2022-11-04 PROCEDURE — 96374 THER/PROPH/DIAG INJ IV PUSH: CPT

## 2022-11-04 PROCEDURE — 87635 SARS-COV-2 COVID-19 AMP PRB: CPT

## 2022-11-04 PROCEDURE — 80053 COMPREHEN METABOLIC PANEL: CPT

## 2022-11-04 PROCEDURE — 87040 BLOOD CULTURE FOR BACTERIA: CPT

## 2022-11-04 PROCEDURE — 2500000003 HC RX 250 WO HCPCS

## 2022-11-04 PROCEDURE — 6370000000 HC RX 637 (ALT 250 FOR IP)

## 2022-11-04 PROCEDURE — 83880 ASSAY OF NATRIURETIC PEPTIDE: CPT

## 2022-11-04 PROCEDURE — 6360000002 HC RX W HCPCS

## 2022-11-04 PROCEDURE — 71045 X-RAY EXAM CHEST 1 VIEW: CPT

## 2022-11-04 PROCEDURE — 0202U NFCT DS 22 TRGT SARS-COV-2: CPT

## 2022-11-04 PROCEDURE — 86140 C-REACTIVE PROTEIN: CPT

## 2022-11-04 PROCEDURE — 2060000000 HC ICU INTERMEDIATE R&B

## 2022-11-04 PROCEDURE — 84484 ASSAY OF TROPONIN QUANT: CPT

## 2022-11-04 PROCEDURE — 85025 COMPLETE CBC W/AUTO DIFF WBC: CPT

## 2022-11-04 PROCEDURE — 36415 COLL VENOUS BLD VENIPUNCTURE: CPT

## 2022-11-04 PROCEDURE — 93005 ELECTROCARDIOGRAM TRACING: CPT

## 2022-11-04 PROCEDURE — 81003 URINALYSIS AUTO W/O SCOPE: CPT

## 2022-11-04 PROCEDURE — 2580000003 HC RX 258

## 2022-11-04 PROCEDURE — 71275 CT ANGIOGRAPHY CHEST: CPT

## 2022-11-04 PROCEDURE — 84145 PROCALCITONIN (PCT): CPT

## 2022-11-04 PROCEDURE — 99223 1ST HOSP IP/OBS HIGH 75: CPT | Performed by: STUDENT IN AN ORGANIZED HEALTH CARE EDUCATION/TRAINING PROGRAM

## 2022-11-04 PROCEDURE — 83605 ASSAY OF LACTIC ACID: CPT

## 2022-11-04 RX ORDER — ACETAMINOPHEN 325 MG/1
650 TABLET ORAL ONCE
Status: COMPLETED | OUTPATIENT
Start: 2022-11-04 | End: 2022-11-04

## 2022-11-04 RX ORDER — ACETAMINOPHEN 325 MG/1
TABLET ORAL
Status: COMPLETED
Start: 2022-11-04 | End: 2022-11-04

## 2022-11-04 RX ORDER — FUROSEMIDE 10 MG/ML
20 INJECTION INTRAMUSCULAR; INTRAVENOUS ONCE
Status: COMPLETED | OUTPATIENT
Start: 2022-11-04 | End: 2022-11-04

## 2022-11-04 RX ADMIN — ACETAMINOPHEN 650 MG: 325 TABLET ORAL at 18:52

## 2022-11-04 RX ADMIN — IOPAMIDOL 75 ML: 755 INJECTION, SOLUTION INTRAVENOUS at 21:12

## 2022-11-04 RX ADMIN — FUROSEMIDE 20 MG: 10 INJECTION, SOLUTION INTRAVENOUS at 17:15

## 2022-11-04 RX ADMIN — CEFTRIAXONE 2000 MG: 2 INJECTION, POWDER, FOR SOLUTION INTRAMUSCULAR; INTRAVENOUS at 22:54

## 2022-11-04 RX ADMIN — DOXYCYCLINE 100 MG: 100 INJECTION, POWDER, LYOPHILIZED, FOR SOLUTION INTRAVENOUS at 23:04

## 2022-11-04 ASSESSMENT — ENCOUNTER SYMPTOMS
FACIAL SWELLING: 0
ABDOMINAL DISTENTION: 0
ABDOMINAL PAIN: 0
EYE ITCHING: 0
SHORTNESS OF BREATH: 1
BACK PAIN: 0
CHEST TIGHTNESS: 0
EYE DISCHARGE: 0
APNEA: 0

## 2022-11-04 ASSESSMENT — PAIN SCALES - GENERAL: PAINLEVEL_OUTOF10: 0

## 2022-11-04 ASSESSMENT — PAIN - FUNCTIONAL ASSESSMENT
PAIN_FUNCTIONAL_ASSESSMENT: NONE - DENIES PAIN
PAIN_FUNCTIONAL_ASSESSMENT: NONE - DENIES PAIN
PAIN_FUNCTIONAL_ASSESSMENT: 0-10

## 2022-11-04 NOTE — ED NOTES
Pt pulse ox 98% on 3L, decreased supplemental O2. Pulse ox 96% on RA.      [address], RN  11/04/22 [id number]

## 2022-11-05 VITALS
HEIGHT: 72 IN | OXYGEN SATURATION: 96 % | TEMPERATURE: 98.4 F | BODY MASS INDEX: 30.04 KG/M2 | RESPIRATION RATE: 20 BRPM | DIASTOLIC BLOOD PRESSURE: 101 MMHG | SYSTOLIC BLOOD PRESSURE: 154 MMHG | WEIGHT: 221.8 LBS | HEART RATE: 76 BPM

## 2022-11-05 LAB
ADENOVIRUS BY PCR: NOT DETECTED
ANION GAP SERPL CALCULATED.3IONS-SCNC: 14 MMOL/L (ref 7–16)
BASOPHILS ABSOLUTE: 0.02 E9/L (ref 0–0.2)
BASOPHILS RELATIVE PERCENT: 0.4 % (ref 0–2)
BORDETELLA PARAPERTUSSIS BY PCR: NOT DETECTED
BORDETELLA PERTUSSIS BY PCR: NOT DETECTED
BUN BLDV-MCNC: 23 MG/DL (ref 6–20)
C-REACTIVE PROTEIN: 2.6 MG/DL (ref 0–0.4)
CALCIUM SERPL-MCNC: 9.7 MG/DL (ref 8.6–10.2)
CHLAMYDOPHILIA PNEUMONIAE BY PCR: NOT DETECTED
CHLORIDE BLD-SCNC: 100 MMOL/L (ref 98–107)
CO2: 25 MMOL/L (ref 22–29)
CORONAVIRUS 229E BY PCR: NOT DETECTED
CORONAVIRUS HKU1 BY PCR: NOT DETECTED
CORONAVIRUS NL63 BY PCR: NOT DETECTED
CORONAVIRUS OC43 BY PCR: NOT DETECTED
CREAT SERPL-MCNC: 1.7 MG/DL (ref 0.7–1.2)
EOSINOPHILS ABSOLUTE: 0.01 E9/L (ref 0.05–0.5)
EOSINOPHILS RELATIVE PERCENT: 0.2 % (ref 0–6)
GFR SERPL CREATININE-BSD FRML MDRD: 47 ML/MIN/1.73
GLUCOSE BLD-MCNC: 192 MG/DL (ref 74–99)
HBA1C MFR BLD: 6 % (ref 4–5.6)
HCT VFR BLD CALC: 42.6 % (ref 37–54)
HEMOGLOBIN: 13.9 G/DL (ref 12.5–16.5)
HUMAN METAPNEUMOVIRUS BY PCR: NOT DETECTED
HUMAN RHINOVIRUS/ENTEROVIRUS BY PCR: NOT DETECTED
IMMATURE GRANULOCYTES #: 0.02 E9/L
IMMATURE GRANULOCYTES %: 0.4 % (ref 0–5)
INFLUENZA A BY PCR: NOT DETECTED
INFLUENZA B BY PCR: NOT DETECTED
LYMPHOCYTES ABSOLUTE: 0.34 E9/L (ref 1.5–4)
LYMPHOCYTES RELATIVE PERCENT: 6.1 % (ref 20–42)
MCH RBC QN AUTO: 29.6 PG (ref 26–35)
MCHC RBC AUTO-ENTMCNC: 32.6 % (ref 32–34.5)
MCV RBC AUTO: 90.6 FL (ref 80–99.9)
METER GLUCOSE: 146 MG/DL (ref 74–99)
METER GLUCOSE: 198 MG/DL (ref 74–99)
METER GLUCOSE: 255 MG/DL (ref 74–99)
MONOCYTES ABSOLUTE: 0.13 E9/L (ref 0.1–0.95)
MONOCYTES RELATIVE PERCENT: 2.3 % (ref 2–12)
MYCOPLASMA PNEUMONIAE BY PCR: NOT DETECTED
NEUTROPHILS ABSOLUTE: 5.09 E9/L (ref 1.8–7.3)
NEUTROPHILS RELATIVE PERCENT: 90.6 % (ref 43–80)
PARAINFLUENZA VIRUS 1 BY PCR: NOT DETECTED
PARAINFLUENZA VIRUS 2 BY PCR: NOT DETECTED
PARAINFLUENZA VIRUS 3 BY PCR: NOT DETECTED
PARAINFLUENZA VIRUS 4 BY PCR: NOT DETECTED
PDW BLD-RTO: 13.9 FL (ref 11.5–15)
PLATELET # BLD: 176 E9/L (ref 130–450)
PMV BLD AUTO: 10.4 FL (ref 7–12)
POTASSIUM REFLEX MAGNESIUM: 3.7 MMOL/L (ref 3.5–5)
PRO-BNP: ABNORMAL PG/ML (ref 0–125)
PROCALCITONIN: 0.24 NG/ML (ref 0–0.08)
RBC # BLD: 4.7 E12/L (ref 3.8–5.8)
RBC # BLD: NORMAL 10*6/UL
RESPIRATORY SYNCYTIAL VIRUS BY PCR: DETECTED
SARS-COV-2, PCR: NOT DETECTED
SODIUM BLD-SCNC: 139 MMOL/L (ref 132–146)
WBC # BLD: 5.6 E9/L (ref 4.5–11.5)

## 2022-11-05 PROCEDURE — 6360000002 HC RX W HCPCS: Performed by: STUDENT IN AN ORGANIZED HEALTH CARE EDUCATION/TRAINING PROGRAM

## 2022-11-05 PROCEDURE — 80048 BASIC METABOLIC PNL TOTAL CA: CPT

## 2022-11-05 PROCEDURE — 94640 AIRWAY INHALATION TREATMENT: CPT

## 2022-11-05 PROCEDURE — 2580000003 HC RX 258: Performed by: STUDENT IN AN ORGANIZED HEALTH CARE EDUCATION/TRAINING PROGRAM

## 2022-11-05 PROCEDURE — 83036 HEMOGLOBIN GLYCOSYLATED A1C: CPT

## 2022-11-05 PROCEDURE — 6360000002 HC RX W HCPCS: Performed by: INTERNAL MEDICINE

## 2022-11-05 PROCEDURE — 85025 COMPLETE CBC W/AUTO DIFF WBC: CPT

## 2022-11-05 PROCEDURE — 36415 COLL VENOUS BLD VENIPUNCTURE: CPT

## 2022-11-05 PROCEDURE — 99239 HOSP IP/OBS DSCHRG MGMT >30: CPT | Performed by: INTERNAL MEDICINE

## 2022-11-05 PROCEDURE — 6370000000 HC RX 637 (ALT 250 FOR IP)

## 2022-11-05 PROCEDURE — 6370000000 HC RX 637 (ALT 250 FOR IP): Performed by: STUDENT IN AN ORGANIZED HEALTH CARE EDUCATION/TRAINING PROGRAM

## 2022-11-05 PROCEDURE — 82962 GLUCOSE BLOOD TEST: CPT

## 2022-11-05 RX ORDER — DEXTROSE MONOHYDRATE 100 MG/ML
INJECTION, SOLUTION INTRAVENOUS CONTINUOUS PRN
Status: DISCONTINUED | OUTPATIENT
Start: 2022-11-05 | End: 2022-11-05 | Stop reason: HOSPADM

## 2022-11-05 RX ORDER — FUROSEMIDE 10 MG/ML
20 INJECTION INTRAMUSCULAR; INTRAVENOUS 2 TIMES DAILY
Status: DISCONTINUED | OUTPATIENT
Start: 2022-11-05 | End: 2022-11-05

## 2022-11-05 RX ORDER — INSULIN LISPRO 100 [IU]/ML
0-4 INJECTION, SOLUTION INTRAVENOUS; SUBCUTANEOUS NIGHTLY
Status: DISCONTINUED | OUTPATIENT
Start: 2022-11-05 | End: 2022-11-05 | Stop reason: HOSPADM

## 2022-11-05 RX ORDER — ASPIRIN 81 MG/1
81 TABLET, CHEWABLE ORAL DAILY
Status: DISCONTINUED | OUTPATIENT
Start: 2022-11-05 | End: 2022-11-05 | Stop reason: HOSPADM

## 2022-11-05 RX ORDER — HYDRALAZINE HYDROCHLORIDE 50 MG/1
100 TABLET, FILM COATED ORAL 3 TIMES DAILY
Status: DISCONTINUED | OUTPATIENT
Start: 2022-11-05 | End: 2022-11-05 | Stop reason: HOSPADM

## 2022-11-05 RX ORDER — CARVEDILOL 25 MG/1
25 TABLET ORAL 2 TIMES DAILY WITH MEALS
Status: DISCONTINUED | OUTPATIENT
Start: 2022-11-05 | End: 2022-11-05 | Stop reason: HOSPADM

## 2022-11-05 RX ORDER — FUROSEMIDE 10 MG/ML
40 INJECTION INTRAMUSCULAR; INTRAVENOUS ONCE
Status: COMPLETED | OUTPATIENT
Start: 2022-11-05 | End: 2022-11-05

## 2022-11-05 RX ORDER — HEPARIN SODIUM 10000 [USP'U]/ML
5000 INJECTION, SOLUTION INTRAVENOUS; SUBCUTANEOUS 2 TIMES DAILY
Status: DISCONTINUED | OUTPATIENT
Start: 2022-11-05 | End: 2022-11-05 | Stop reason: HOSPADM

## 2022-11-05 RX ORDER — POLYETHYLENE GLYCOL 3350 17 G/17G
17 POWDER, FOR SOLUTION ORAL DAILY PRN
Status: DISCONTINUED | OUTPATIENT
Start: 2022-11-05 | End: 2022-11-05 | Stop reason: HOSPADM

## 2022-11-05 RX ORDER — LEVALBUTEROL 1.25 MG/.5ML
1.25 SOLUTION, CONCENTRATE RESPIRATORY (INHALATION)
Status: DISCONTINUED | OUTPATIENT
Start: 2022-11-05 | End: 2022-11-05 | Stop reason: HOSPADM

## 2022-11-05 RX ORDER — INSULIN LISPRO 100 [IU]/ML
0-4 INJECTION, SOLUTION INTRAVENOUS; SUBCUTANEOUS
Status: DISCONTINUED | OUTPATIENT
Start: 2022-11-05 | End: 2022-11-05 | Stop reason: HOSPADM

## 2022-11-05 RX ORDER — ONDANSETRON 4 MG/1
4 TABLET, ORALLY DISINTEGRATING ORAL EVERY 8 HOURS PRN
Status: DISCONTINUED | OUTPATIENT
Start: 2022-11-05 | End: 2022-11-05

## 2022-11-05 RX ORDER — SODIUM CHLORIDE 9 MG/ML
INJECTION, SOLUTION INTRAVENOUS CONTINUOUS
Status: DISCONTINUED | OUTPATIENT
Start: 2022-11-05 | End: 2022-11-05

## 2022-11-05 RX ORDER — ONDANSETRON 2 MG/ML
4 INJECTION INTRAMUSCULAR; INTRAVENOUS EVERY 6 HOURS PRN
Status: DISCONTINUED | OUTPATIENT
Start: 2022-11-05 | End: 2022-11-05

## 2022-11-05 RX ORDER — LANOLIN ALCOHOL/MO/W.PET/CERES
3 CREAM (GRAM) TOPICAL NIGHTLY PRN
Status: DISCONTINUED | OUTPATIENT
Start: 2022-11-05 | End: 2022-11-05 | Stop reason: HOSPADM

## 2022-11-05 RX ORDER — SPIRONOLACTONE 25 MG/1
25 TABLET ORAL DAILY
Status: DISCONTINUED | OUTPATIENT
Start: 2022-11-05 | End: 2022-11-05 | Stop reason: HOSPADM

## 2022-11-05 RX ORDER — ACETAMINOPHEN 325 MG/1
650 TABLET ORAL EVERY 6 HOURS PRN
Status: DISCONTINUED | OUTPATIENT
Start: 2022-11-05 | End: 2022-11-05 | Stop reason: HOSPADM

## 2022-11-05 RX ORDER — SODIUM CHLORIDE 0.9 % (FLUSH) 0.9 %
5-40 SYRINGE (ML) INJECTION EVERY 12 HOURS SCHEDULED
Status: DISCONTINUED | OUTPATIENT
Start: 2022-11-05 | End: 2022-11-05 | Stop reason: HOSPADM

## 2022-11-05 RX ORDER — SODIUM CHLORIDE 0.9 % (FLUSH) 0.9 %
5-40 SYRINGE (ML) INJECTION PRN
Status: DISCONTINUED | OUTPATIENT
Start: 2022-11-05 | End: 2022-11-05 | Stop reason: HOSPADM

## 2022-11-05 RX ORDER — ACETAMINOPHEN 650 MG/1
650 SUPPOSITORY RECTAL EVERY 6 HOURS PRN
Status: DISCONTINUED | OUTPATIENT
Start: 2022-11-05 | End: 2022-11-05 | Stop reason: HOSPADM

## 2022-11-05 RX ORDER — SODIUM CHLORIDE 9 MG/ML
INJECTION, SOLUTION INTRAVENOUS PRN
Status: DISCONTINUED | OUTPATIENT
Start: 2022-11-05 | End: 2022-11-05 | Stop reason: HOSPADM

## 2022-11-05 RX ORDER — METHYLPREDNISOLONE SODIUM SUCCINATE 40 MG/ML
40 INJECTION, POWDER, LYOPHILIZED, FOR SOLUTION INTRAMUSCULAR; INTRAVENOUS DAILY
Status: DISCONTINUED | OUTPATIENT
Start: 2022-11-05 | End: 2022-11-05

## 2022-11-05 RX ADMIN — ACETAMINOPHEN 650 MG: 325 TABLET ORAL at 01:56

## 2022-11-05 RX ADMIN — HYDRALAZINE HYDROCHLORIDE 100 MG: 50 TABLET, FILM COATED ORAL at 09:24

## 2022-11-05 RX ADMIN — LEVALBUTEROL 1.25 MG: 1.25 SOLUTION, CONCENTRATE RESPIRATORY (INHALATION) at 13:00

## 2022-11-05 RX ADMIN — ASPIRIN 81 MG CHEWABLE TABLET 81 MG: 81 TABLET CHEWABLE at 09:25

## 2022-11-05 RX ADMIN — SACUBITRIL AND VALSARTAN 1 TABLET: 24; 26 TABLET, FILM COATED ORAL at 09:25

## 2022-11-05 RX ADMIN — METHYLPREDNISOLONE SODIUM SUCCINATE 40 MG: 40 INJECTION, POWDER, FOR SOLUTION INTRAMUSCULAR; INTRAVENOUS at 02:17

## 2022-11-05 RX ADMIN — SODIUM CHLORIDE: 9 INJECTION, SOLUTION INTRAVENOUS at 02:22

## 2022-11-05 RX ADMIN — LEVALBUTEROL 1.25 MG: 1.25 SOLUTION, CONCENTRATE RESPIRATORY (INHALATION) at 07:28

## 2022-11-05 RX ADMIN — AZITHROMYCIN DIHYDRATE 500 MG: 500 INJECTION, POWDER, LYOPHILIZED, FOR SOLUTION INTRAVENOUS at 02:25

## 2022-11-05 RX ADMIN — CARVEDILOL 25 MG: 25 TABLET, FILM COATED ORAL at 09:25

## 2022-11-05 RX ADMIN — SPIRONOLACTONE 25 MG: 25 TABLET ORAL at 09:25

## 2022-11-05 RX ADMIN — FUROSEMIDE 40 MG: 10 INJECTION, SOLUTION INTRAMUSCULAR; INTRAVENOUS at 12:30

## 2022-11-05 RX ADMIN — Medication 3 MG: at 01:56

## 2022-11-05 RX ADMIN — FUROSEMIDE 20 MG: 10 INJECTION, SOLUTION INTRAVENOUS at 10:15

## 2022-11-05 ASSESSMENT — PAIN SCALES - GENERAL
PAINLEVEL_OUTOF10: 0

## 2022-11-05 NOTE — PLAN OF CARE
Addendum: I have personally participated in the history, exam, medical decision making with Maida Ramires on the date of service and I agree with all of the pertinent clinical information unless otherwise noted. I have also reviewed and agree with the past medical, family, and social history unless otherwise noted. Patient was admitted with shortness of breath    PHYSICAL EXAM:  Vitals:  BP (!) 150/107   Pulse (!) 119   Temp 99 °F (37.2 °C) (Oral)   Resp 20   Ht 6' (1.829 m)   Wt 221 lb 12.8 oz (100.6 kg)   SpO2 97%   BMI 30.08 kg/m²   Gen: awake, alert, NAD  Lungs: clear to auscultation bilaterally no crackles no wheezing. Heart: RRR, no murmur   Abdomen: soft nontender nondistended positive bowel sounds. Extremities: full range of motion no peripheral edema. Impression:  Principal Problem (Resolved): Hypoxia  Active Problems:    Dyspnea    Wheezing    Fever    CHF (congestive heart failure) ([facility] [address]      My findings/plan include:    Sepsis - BCx from 11/4 are pending. CXR is negative for infectious process. UA is unremarkable. Viral respiratory panel is pending. Currently on rocephin and doxycyline times 1. Will continue azithromycin and rocephin. DC IVF due to cardiomyopathy. Procal is slightly elevated. Non oliguric AVRIL on CKD  - Unknown baseline as the last set of labs were from 2020 and at that time her creatinine was 1.6. Will continue to monitor renal functions. This may be patient's new baseline. Acute on chronic systolic CHF exacerbation, EF 20-25% (2019) - Patient is on entresto, lasix 20, and aldactone 25 at home. Currently we will resume entresto, aldactone, and start him on lasix 20 mg IV BID. Will place hardin catheter. Patient follows up with Dr Maritza Martino. HTN, uncontrolled - Continue coreg and hydralazine. Patient is also on entresto, aldactone, and IV lasix. DM-II - Hold glimepiride. Will start on insulin SS.    DVT prophylaxis - Heparin    NOTE: This report was transcribed using voice recognition software. Every effort was made to ensure accuracy; however, inadvertent computerized transcription errors may be present.     Electronically signed by Tish Crowell DO on 11/5/2022 at 9:09 AM

## 2022-11-05 NOTE — PROGRESS NOTES
Pharmacy Note    An order for Zofran has been discontinued for this patient due to the risk of QT prolongation. If an antiemetic is indicated for your patient, please consider use of Tigan or Compazine. Current QTc = 526 ms  Please contact the Pharmacy with any questions.   VIVEK Mathews [facility]  11/5/2022  1:08 AM

## 2022-11-05 NOTE — ED PROVIDER NOTES
Valarie Alberto 56 y.o. male PHMx of CHF, ICD placement, dilated cardiomyopathy, essential hypertension, presents to the ED c/o shortness of breath. Onset: Chronic issue but got worse yesterday. Patient reports he could not lay flat and even sleep. Location/Radiation: No centralized region of pain. Duration: Constant now worsening. Characterization: Short of breath. Aggravating Factors: Physical activity, laying flat. Relieving Factors: Sitting up, using pillows at night, rest. Severity: Moderate. Assx Sxs: Shortness of breath, proximal orthopnea, nocturnal proximal orthopnea. She/He Denies: Fever/chills, abdominal pain, numbness/tingling, dysuria/hematuria. Review of Systems   Constitutional:  Negative for activity change and appetite change. HENT:  Negative for congestion and facial swelling. Eyes:  Negative for discharge and itching. Respiratory:  Positive for shortness of breath. Negative for apnea and chest tightness. Cardiovascular:  Negative for chest pain, palpitations and leg swelling. Gastrointestinal:  Negative for abdominal distention and abdominal pain. Endocrine: Negative for cold intolerance and heat intolerance. Genitourinary:  Negative for dysuria and flank pain. Musculoskeletal:  Negative for arthralgias and back pain. Allergic/Immunologic: Negative for environmental allergies and food allergies. Neurological:  Negative for dizziness. Hematological:  Negative for adenopathy. Does not bruise/bleed easily. Psychiatric/Behavioral:  Negative for agitation and behavioral problems. Physical Exam  Constitutional:       General: He is not in acute distress. Appearance: He is normal weight. He is not ill-appearing, toxic-appearing or diaphoretic. HENT:      Head: Normocephalic and atraumatic. Mouth/Throat:      Mouth: Mucous membranes are moist.      Pharynx: Oropharynx is clear. Eyes:      Extraocular Movements: Extraocular movements intact. Pupils: Pupils are equal, round, and reactive to light. Neck:      Thyroid: No thyromegaly. Vascular: No hepatojugular reflux. Trachea: No tracheal deviation. Cardiovascular:      Rate and Rhythm: Normal rate and regular rhythm. Pulses: Normal pulses. Heart sounds: Normal heart sounds. Pulmonary:      Effort: Pulmonary effort is normal. No tachypnea, bradypnea or accessory muscle usage. Breath sounds: Wheezing and rales present. Musculoskeletal:         General: Normal range of motion. Cervical back: Normal range of motion. Right lower leg: No edema. Left lower leg: No edema. Skin:     General: Skin is warm and dry. Capillary Refill: Capillary refill takes less than 2 seconds. Neurological:      General: No focal deficit present. Mental Status: He is alert and oriented to person, place, and time. Psychiatric:         Mood and Affect: Mood normal.         Behavior: Behavior normal.        Procedures     MDM  Number of Diagnoses or Management Options  Diagnosis management comments: This is a 56-year-old male with a history of CHF that presents to the ER via private vehicle with a complaint of increasing shortness of breath. Upon evaluation patient he was AOx4, cooperative, normotensive, hypoxic on room air, which normalized with 2 L nasal cannula oxygen. He was originally afebrile, but during his ER stay he popped a fever, he was given 1 g of Tylenol. His laboratory evaluation did not show a leukocytosis, his electrolytes were within normal limits. COVID-19 and influenza were negative. Initial troponin 24, repeat troponin 26. Due to the patient's CHF no IV fluids was given to prevent exacerbation of this. CT of the chest with contrast showed no evidence of pulmonary embolism or acute pulmonary abnormality. Chest x-ray does show cardiomegaly.   Due to the patient's worsening shortness of breath, there is concern for acute exacerbation of CHF and also possible pneumonia. Blood cultures x2, procalcitonin, CRP, and Respiratory panel collected. Pt was then given Rocephin and doxycycline. Discussed case with the hospitalist and they agreed to admit the patient for further evaluation work-up. Patient verbally consents agreed to the plan. Patient normotensive at time admission         ED Course as of 11/04/22 2351 Fri Nov 04, 2022 2023 Patient hospitalist for admission at 2000, hospitalist is dealing with rapid response [JR]   2055 First EKG: This EKG is signed and interpreted by EP. Rate: 88  Rhythm: Sinus  Interpretation: no acute changes, st segment elevations/depressions, no T waves abnormalities. Comparison: stable  [JR]   2123 2nd EKG: This EKG is signed and interpreted by EP. Rate: 127  Rhythm: Sinus  Interpretation: sinus tachycardia   Comparison: stable as compared to patient's most recent EKG and changes compared to previous EKG  [JR]      ED Course User Index  [JR] Marsarina Darcie, DO         ED Course as of 11/04/22 2351 Fri Nov 04, 2022 2023 Patient hospitalist for admission at 2000, hospitalist is dealing with rapid response [JR]   2055 First EKG: This EKG is signed and interpreted by EP. Rate: 88  Rhythm: Sinus  Interpretation: no acute changes, st segment elevations/depressions, no T waves abnormalities. Comparison: stable  [JR]   2123 2nd EKG: This EKG is signed and interpreted by EP.     Rate: 127  Rhythm: Sinus  Interpretation: sinus tachycardia   Comparison: stable as compared to patient's most recent EKG and changes compared to previous EKG  [JR]      ED Course User Index  [JR] Marsarina Darcie, DO       --------------------------------------------- PAST HISTORY ---------------------------------------------  Past Medical History:  has a past medical history of Cerebral artery occlusion with cerebral infarction [facility]), [facility]cong[facility] heart [facility]) ([facility] Colon cancer screening, Dilated cardiomyopathy ([facility] Hypertension, Nonrheumatic mitral (valve) insufficiency, and Nonrheumatic tricuspid (valve) insufficiency. Past Surgical History:  has a past surgical history that includes Cardiac defibrillator placement (Left, 10/10/2017); pacemaker placement; Tonsillectomy; and Colonoscopy (N/A, 1/20/2021). Social History:  reports that he has never smoked. He has never used smokeless tobacco. He reports that he does not currently use alcohol. He reports current drug use. Frequency: 7.00 times per week. Drug: Marijuana [place]). Family History: family history includes Heart Failure in his mother; Hypertension in his mother. The patients home medications have been reviewed. Allergies: Patient has no known allergies.     -------------------------------------------------- RESULTS -------------------------------------------------    LABS:  Results for orders placed or performed during the hospital encounter of 11/04/22   COVID-19, Rapid    Specimen: Nasopharyngeal Swab   Result Value Ref Range    SARS-CoV-2, NAAT Not Detected Not Detected   Rapid influenza A/B antigens    Specimen: Nares   Result Value Ref Range    Influenza A by PCR Not Detected Not Detected    Influenza B by PCR Not Detected Not Detected   CMP   Result Value Ref Range    Sodium 142 132 - 146 mmol/L    Potassium 4.0 3.5 - 5.0 mmol/L    Chloride 101 98 - 107 mmol/L    CO2 26 22 - 29 mmol/L    Anion Gap 15 7 - 16 mmol/L    Glucose 165 (H) 74 - 99 mg/dL    BUN 25 (H) 6 - 20 mg/dL    Creatinine 1.7 (H) 0.7 - 1.2 mg/dL    Est, Glom Filt Rate 47 >=60 mL/min/1.73    Calcium 9.6 8.6 - 10.2 mg/dL    Total Protein 7.2 6.4 - 8.3 g/dL    Albumin 4.4 3.5 - 5.2 g/dL    Total Bilirubin 2.2 (H) 0.0 - 1.2 mg/dL    Alkaline Phosphatase 72 40 - 129 U/L    ALT 15 0 - 40 U/L    AST 18 0 - 39 U/L   CBC with Auto Differential   Result Value Ref Range    WBC 6.9 4.5 - 11.5 E9/L    RBC 4.87 3.80 - 5.80 E12/L    Hemoglobin 14.7 12.5 - 16.5 g/dL    Hematocrit 44.4 37.0 - 54.0 % MCV 91.2 80.0 - 99.9 fL    MCH 30.2 26.0 - 35.0 pg    MCHC 33.1 32.0 - 34.5 %    RDW 13.8 11.5 - 15.0 fL    Platelets 392 776 - 190 E9/L    MPV 9.9 7.0 - 12.0 fL    Neutrophils % 80.9 (H) 43.0 - 80.0 %    Immature Granulocytes % 0.1 0.0 - 5.0 %    Lymphocytes % 11.2 (L) 20.0 - 42.0 %    Monocytes % 5.6 2.0 - 12.0 %    Eosinophils % 1.9 0.0 - 6.0 %    Basophils % 0.3 0.0 - 2.0 %    Neutrophils Absolute 5.61 1.80 - 7.30 E9/L    Immature Granulocytes # 0.01 E9/L    Lymphocytes Absolute 0.78 (L) 1.50 - 4.00 E9/L    Monocytes Absolute 0.39 0.10 - 0.95 E9/L    Eosinophils Absolute 0.13 0.05 - 0.50 E9/L    Basophils Absolute 0.02 0.00 - 0.20 E9/L   Magnesium   Result Value Ref Range    Magnesium 1.9 1.6 - 2.6 mg/dL   Troponin   Result Value Ref Range    Troponin, High Sensitivity 26 (H) 0 - 11 ng/L   Urinalysis   Result Value Ref Range    Color, UA Yellow Straw/Yellow    Clarity, UA Clear Clear    Glucose, Ur Negative Negative mg/dL    Bilirubin Urine Negative Negative    Ketones, Urine Negative Negative mg/dL    Specific Gravity, UA 1.015 1.005 - 1.030    Blood, Urine Negative Negative    pH, UA 6.5 5.0 - 9.0    Protein, UA Negative Negative mg/dL    Urobilinogen, Urine 0.2 <2.0 E.U./dL    Nitrite, Urine Negative Negative    Leukocyte Esterase, Urine Negative Negative   Troponin   Result Value Ref Range    Troponin, High Sensitivity 24 (H) 0 - 11 ng/L   Lactic Acid   Result Value Ref Range    Lactic Acid 1.2 0.5 - 2.2 mmol/L   EKG 12 Lead   Result Value Ref Range    Ventricular Rate 88 BPM    Atrial Rate 88 BPM    P-R Interval 210 ms    QRS Duration 132 ms    Q-T Interval 414 ms    QTc Calculation (Bazett) 500 ms    P Axis 38 degrees    R Axis 6 degrees    T Axis 121 degrees   EKG 12 Lead   Result Value Ref Range    Ventricular Rate 127 BPM    Atrial Rate 127 BPM    P-R Interval 192 ms    QRS Duration 142 ms    Q-T Interval 362 ms    QTc Calculation (Bazett) 526 ms    P Axis -19 degrees    R Axis 31 degrees    T Axis 41 degrees       RADIOLOGY:  CTA CHEST W CONTRAST   Final Result   No evidence of pulmonary embolism or acute pulmonary abnormality. RECOMMENDATIONS:   Careful clinical correlation and follow up recommended. XR CHEST PORTABLE   Final Result   No acute process. Cardiomegaly. ------------------------- NURSING NOTES AND VITALS REVIEWED ---------------------------  Date / Time Roomed:  11/4/2022  4:05 PM  ED Bed Assignment:  08/08    The nursing notes within the ED encounter and vital signs as below have been reviewed. Patient Vitals for the past 24 hrs:   BP Temp Temp src Pulse Resp SpO2 Height Weight   11/04/22 2330 (!) 149/108 99.3 °F (37.4 °C) -- (!) 122 18 92 % -- --   11/04/22 2013 (!) 161/118 100.2 °F (37.9 °C) -- (!) 128 18 93 % -- --   11/04/22 1833 (!) 158/110 (!) 101.1 °F (38.4 °C) Oral 92 18 95 % -- --   11/04/22 1707 (!) 164/110 -- -- 86 20 97 % -- --   11/04/22 1602 (!) 160/119 -- -- 83 20 100 % 6' (1.829 m) 225 lb (102.1 kg)       Oxygen Saturation Interpretation: Normal    ------------------------------------------ PROGRESS NOTES ------------------------------------------  Re-evaluation(s):  Time: 2230  Patients symptoms show no change  Repeat physical examination is not changed    Counseling:  I have spoken with the patient and discussed todays results, in addition to providing specific details for the plan of care and counseling regarding the diagnosis and prognosis. Their questions are answered at this time and they are agreeable with the plan of admission.    --------------------------------- ADDITIONAL PROVIDER NOTES ---------------------------------  Consultations:  Time: 2300. Spoke with Dr. Santana Lu Team.  Discussed case. They will admit the patient.   This patient's ED course included: a personal history and physicial examination, re-evaluation prior to disposition, multiple bedside re-evaluations, IV medications, cardiac monitoring, and continuous pulse oximetry    This patient has remained hemodynamically stable during their ED course. Diagnosis:  1. Hypoxia    2. Orthopnea    3. Fever, unspecified fever cause    4. Acute on chronic systolic congestive heart failure (HCC)        Disposition:  Patient's disposition: Admit to telemetry  Patient's condition is stable.         Eric Goldstein DO  Resident  11/04/22 [id number]

## 2022-11-05 NOTE — ED NOTES
Pt sitting up in bed, eating box lunch provided by staff.       [facility], RN  11/04/22 2016

## 2022-11-05 NOTE — DISCHARGE SUMMARY
[facility] Physician Discharge Summary       Modesto Cosby  [phone]    Schedule an appointment as soon as possible for a visit        Activity level: As tolerated     Dispo: Home      Condition on discharge: Stable     Patient ID:  Edy Funez  [id number]  56 y.o.  1965    Admit date: 11/4/2022    Discharge date and time:  11/5/2022  12:50 PM    Admission Diagnoses: Principal Problem (Resolved): Hypoxia  Active Problems:    Dyspnea    Wheezing    Fever    CHF (congestive heart failure) ([facility]      Discharge Diagnoses: Principal Problem (Resolved): Hypoxia  Active Problems:    Dyspnea    Wheezing    Fever    CHF (congestive heart failure) ([facility])      Consults:  None    Procedures: None    Hospital Course:   Patient Edy Funez is a 56 y.o. presented with Orthopnea [R06.01]  Hypoxia [R09.02]  Acute on chronic systolic congestive heart failure (HCC) [I50.23]  Fever, unspecified fever cause [R489]    56year old male with a past medical history of dilated cardiomyopathy, HFrEF (20 to 25%), and AICD who presented to ED with shortness of breath worse with lying down associated with dry cough. He was admitted and treated as below. 1.  Sepsis: Temp on admission was 101.1 with tachycardia. Blood cultures pending. Received Rocephin in ED prior to blood cultures being drawn. CRP 2.6. Procal 0.24. Respiratory panel positive for RSV. No further antibiotics. Monitor. 2. Acute HFeEF with dilated cardiomyopathy: Echo on 7/16/19 showing EF 20-25%, severely dilated left ventricle, severe global hypokinesis, moderate LVH, significant MR, increased end diastolic pressure, moderate to severe MR, and mild pulmonary hypertension. CXR showing cardiomegaly, no acute process. CTA chest showing no acute process. Pro-BNP 13,380. Received IV Lasix 20 mg x1 and IV Lasix 40 mg x1. Continue aldactone, Entresto, Lasix and hydralazine. Strict I&Os. Daily weights. Low salt diet. 3. RSV: CXR showing cardiomegaly, no acute process. CTA chest showing no acute process. CRP 2.6. Procal 0.24. Respiratory panel positive for RSV. Received IV Solu-medrol 40 mg daily. Continue Xopenex q6h. Received Azithromycin. No further antibiotics. 4. CKD: Received gentle hydration. Baseline Cr 1.5-1.6. Avoid nephrotoxic agents. Monitor. 5. Elevated troponin: Trop 24>26. EKG with sinus tachycardia and LBBB. Denies CP. Monitor. 6. Hypertension: Continue Coreg      7. DM2: Low dose ssi. DM diet. Hypoglycemic protocol. Patient is now hemodynamically stable and ready for discharge. He is to follow up with PCP. Discharge Exam:  General Appearance: alert and oriented to person, place and time and in no acute distress  Skin: warm and dry  Head: normocephalic and atraumatic  Eyes: pupils equal, round, and reactive to light, extraocular eye movements intact, conjunctivae normal  Neck: neck supple and non tender without mass   Pulmonary/Chest: minimal expiratory wheezing in LLL, no rales or rhonchi, normal air movement, no respiratory distress  Cardiovascular: normal rate, normal S1 and S2 and no carotid bruits  Abdomen: soft, non-tender, non-distended, normal bowel sounds, no masses or organomegaly  Extremities: no cyanosis, no clubbing and no edema  Neurologic: no cranial nerve deficit and speech normal    I/O last 3 completed shifts: In: 5 [P.O.:420]  Out: 50 [Urine:50]  No intake/output data recorded.       LABS:  Recent Labs     11/04/22 1651 11/05/22  0705    139   K 4.0 3.7    100   CO2 26 25   BUN 25* 23*   CREATININE 1.7* 1.7*   GLUCOSE 165* 192*   CALCIUM 9.6 9.7       Recent Labs     11/04/22  1651 11/05/22  0705   WBC 6.9 5.6   RBC 4.87 4.70   HGB 14.7 13.9   HCT 44.4 42.6   MCV 91.2 90.6   MCH 30.2 29.6   MCHC 33.1 32.6   RDW 13.8 13.9    176   MPV 9.9 10.4       No results for input(s): POCGLU in the last 72 hours.    Imaging:  XR CHEST PORTABLE    Result Date: 11/4/2022  EXAMINATION: ONE XRAY VIEW OF THE CHEST 11/4/2022 4:39 pm COMPARISON: 4/13/20 HISTORY: ORDERING SYSTEM PROVIDED HISTORY: concern for chf exacerbation TECHNOLOGIST PROVIDED HISTORY: Reason for exam:->concern for chf exacerbation FINDINGS: The lungs are without acute focal process. There is no effusion or pneumothorax. Cardiomegaly. The osseous structures are without acute process. No acute process. Cardiomegaly. CTA CHEST W CONTRAST    Result Date: 11/4/2022  EXAMINATION: CTA OF THE CHEST 11/4/2022 8:52 pm TECHNIQUE: CTA of the chest was performed after the administration of intravenous contrast.  Multiplanar reformatted images are provided for review. MIP images are provided for review. Automated exposure control, iterative reconstruction, and/or weight based adjustment of the mA/kV was utilized to reduce the radiation dose to as low as reasonably achievable. COMPARISON: None. HISTORY: ORDERING SYSTEM PROVIDED HISTORY: ro pe TECHNOLOGIST PROVIDED HISTORY: Reason for exam:->ro pe Decision Support Exception - unselect if not a suspected or confirmed emergency medical condition->Emergency Medical Condition (MA) FINDINGS: Pulmonary Arteries: Pulmonary arteries are adequately opacified for evaluation. No evidence of intraluminal filling defect to suggest pulmonary embolism. Main pulmonary artery is normal in caliber. Mediastinum: No evidence of mediastinal lymphadenopathy. Moderate cardiomegaly. The heart and pericardium demonstrate no acute abnormality. There is no acute abnormality of the thoracic aorta. Lungs/pleura: The lungs are without acute process. No focal consolidation or pulmonary edema. No evidence of pleural effusion or pneumothorax. Upper Abdomen: Limited images of the upper abdomen are unremarkable. Soft Tissues/Bones: No acute bone or soft tissue abnormality.      No evidence of pulmonary embolism or acute pulmonary abnormality. RECOMMENDATIONS: Careful clinical correlation and follow up recommended. Patient Instructions:      Medication List        CONTINUE taking these medications      aspirin 81 MG tablet     carvedilol 25 MG tablet  Commonly known as: COREG  Take 1 tablet by mouth 2 times daily (with meals). furosemide 20 MG tablet  Commonly known as: LASIX     glimepiride 2 MG tablet  Commonly known as: AMARYL     hydrALAZINE 100 MG tablet  Commonly known as: APRESOLINE  Take 1 tablet by mouth 3 times daily     sacubitril-valsartan 24-26 MG per tablet  Commonly known as: ENTRESTO  Take 1 tablet by mouth 2 times daily     Sphygmomanometer Misc  1 Units by Does not apply route 2 times daily     spironolactone 25 MG tablet  Commonly known as: ALDACTONE                35 minutes time spent reviewing patient chart, assessing patient, discussing plan of care with patient and family, discussing plan of care with collaborating physician, and charting. Signed:  Electronically signed by Wicho Rodríguez PA-C on 11/5/2022 at 12:50 PM     Addendum: I have personally participated in the history, exam, medical decision making with Maida Ramires on the date of service and I agree with all of the pertinent clinical information unless otherwise noted. I have also reviewed and agree with the past medical, family, and social history unless otherwise noted. Patient was admitted with shortness of breath     PHYSICAL EXAM:  Vitals:  BP (!) 150/107   Pulse (!) 119   Temp 99 °F (37.2 °C) (Oral)   Resp 20   Ht 6' (1.829 m)   Wt 221 lb 12.8 oz (100.6 kg)   SpO2 97%   BMI 30.08 kg/m²   Gen: awake, alert, NAD  Lungs: clear to auscultation bilaterally no crackles no wheezing. Heart: RRR, no murmur   Abdomen: soft nontender nondistended positive bowel sounds. Extremities: full range of motion no peripheral edema. Impression:  Principal Problem (Resolved):     Hypoxia  Active Problems:    Dyspnea    Wheezing    Fever CHF (congestive heart failure) ([facility]        My findings/plan include:     Patient states that he feels back to baseline. He is positive for RSV. He has also been diuresed with lasix 40 mg IVP times 1. F/U with PCP and cardiology     NOTE: This report was transcribed using voice recognition software. Every effort was made to ensure accuracy; however, inadvertent computerized transcription errors may be present.      Electronically signed by Arslan Cam DO on 11/5/2022 at 9:09 AM

## 2022-11-05 NOTE — H&P
[facility] History and Physical      CHIEF COMPLAINT: Shortness of breath    History of Present Illness: 32-year-old male with history of dilated cardiomyopathy, HFrEF (20 to 25%), AICD, who presents with shortness of breath worse with lying down associated with dry cough. Did also have a fever of 101 Fahrenheit in the emergency department. Denies any other acute complaints including chest pain. Patient was also found to be tachycardic in the ED. REVIEW OF SYSTEMS:  A comprehensive review of systems was negative except for: what is in the HPI    PMH:  Past Medical History:   Diagnosis Date    Cerebral artery occlusion with cerebral infarction ([facility] [address] 2017    no residual    CHF (congestive heart failure) ([facility] [address]     Colon cancer screening     Dilated cardiomyopathy ([facility] [address]     Follows with Dr. Mandi Salinas     Hypertension     Nonrheumatic mitral (valve) insufficiency     Nonrheumatic tricuspid (valve) insufficiency        Surgical History:  Past Surgical History:   Procedure Laterality Date    CARDIAC DEFIBRILLATOR PLACEMENT Left 10/10/2017    Medtronic Dual ICD,     COLONOSCOPY N/A 1/20/2021    COLONOSCOPY WITH BIOPSY performed by William Bacon MD at [address] [place] [address]         Medications Prior to Admission:    Prior to Admission medications    Medication Sig Start Date End Date Taking?  Authorizing Provider   Blood Pressure Monitoring (SPHYGMOMANOMETER) MISC 1 Units by Does not apply route 2 times daily  Patient not taking: Reported on 3/17/2022 6/15/21   Tyrone Britton MD   sacubitril-valsartan (ENTRESTO) 24-26 MG per tablet Take 1 tablet by mouth 2 times daily 3/11/21   Tyrone Britton MD   furosemide (LASIX) 20 MG tablet Take 20 mg by mouth daily  12/13/19   Historical Provider, MD   hydrALAZINE (APRESOLINE) 100 MG tablet Take 1 tablet by mouth 3 times daily 7/16/19   Rodrigo John MD   glimepiride (AMARYL) 2 MG tablet Take 2 mg by mouth every morning (before breakfast)    Historical Provider, MD   aspirin 81 MG tablet Take 81 mg by mouth daily    Historical Provider, MD   carvedilol (COREG) 25 MG tablet Take 1 tablet by mouth 2 times daily (with meals). 6/25/14   Lele Sung MD   spironolactone (ALDACTONE) 25 MG tablet Take 25 mg by mouth daily     Historical Provider, MD       Allergies:    Patient has no known allergies. Social History:    reports that he has never smoked. He has never used smokeless tobacco. He reports that he does not currently use alcohol. He reports current drug use. Frequency: 7.00 times per week. Drug: Marijuana Ricarda Hernandez). Family History:   family history includes Heart Failure in his mother; Hypertension in his mother. PHYSICAL EXAM:  Vitals:  BP (!) 161/118   Pulse (!) 128   Temp 100.2 °F (37.9 °C)   Resp 18   Ht 6' (1.829 m)   Wt 225 lb (102.1 kg)   SpO2 93%   BMI 30.52 kg/m²       General: Well developed, well nourished. No acute distress. HEENT: NCAT. PERRL, non-injected conjunctiva, non-icteric sclera. External eyelids without pus/discharge. No lesions on external ears and nose anteriorly. Moist mucous membranes, normal posterior oropharynx. Neck: Trachea midline, no gross masses visualized. No thyromegaly noted. Cardiovascular: Tachycardic, no rubs/gallops. No LE edema. Respiratory: Normal effort, wheezing noted bilaterally, no rhonchi/rales. Abdomen: Soft, ND/NT, bowel sounds present. No HSM palpated. Genitourinary: Deferred  Rectal: Deferred  Neurological: Grossly nonfocal.   Psychiatry: Appropriate mood and affect. AAOx3. Skin: Warm and non-taut on palpation. No ulcers noted on visible skin.        LABS:  Recent Labs     11/04/22  1651      K 4.0      CO2 26   BUN 25*   CREATININE 1.7*   GLUCOSE 165*   CALCIUM 9.6       Recent Labs     11/04/22  1651   WBC 6.9   RBC 4.87   HGB 14.7   HCT 44.4   MCV 91.2   MCH 30.2   MCHC 33.1   RDW 13.8      MPV 9.9 No results for input(s): POCGLU in the last 72 hours. Radiology:   CTA CHEST W CONTRAST   Final Result   No evidence of pulmonary embolism or acute pulmonary abnormality. RECOMMENDATIONS:   Careful clinical correlation and follow up recommended. XR CHEST PORTABLE   Final Result   No acute process. Cardiomegaly. EKG (Independent Interpretation): Sinus tachycardia 127, left bundle branch block    ASSESSMENT:      Principal Problem (Resolved): Hypoxia  Active Problems:    Dyspnea    Wheezing    Fever    CHF (congestive heart failure) ([facility])      PLAN:    Admitted for dyspnea with orthopnea, initially thought to have CHF exacerbation, but chest x-ray as well as CTA chest is completely negative. Moreover, patient does not have any lower extremity edema, so this does not fit a CHF exacerbation picture. Additionally no infiltrates noted on CTA chest and without a white count, this does not appear to be a pneumonic process either. However, given that patient is tachycardic and has a fever, will check viral respiratory panel, CRP, Pro-Elias, blood cultures (unfortunately, patient did receive Rocephin prior to blood cultures being drawn). Patient is noted to have a cough with wheezing on exam, so we will provide azithromycin for potential bronchitis, but will hold off on any additional antibiotics. No wheezing on exam bronchospasm might be present, so we will treat with Solu-Medrol 40 IV daily. Xopenex every 6 hours while awake as well. Lastly, given tachycardia as well as creatinine of 1.7 (baseline of 1.5-1.6), will provide gentle hydration at 100 cc/h for 12 hours.   We will cautiously provide IV fluids due to severe HFrEF (EF of 20 to 25%)    If patient improves in terms of fever and tachycardia, as well as symptomatology, and infectious work-up is negative, hopefully can be discharged from the hospital within 1 to 2 days    Code Status: Full code  DVT prophylaxis: Heparin      NOTE: This report was transcribed using voice recognition software. Every effort was made to ensure accuracy; however, inadvertent computerized transcription errors may be present and meaning should be derived from surrounding context.    Electronically signed by Kenneth Evans MD on 11/4/2022 at 11:31 PM

## 2022-11-06 LAB
EKG ATRIAL RATE: 127 BPM
EKG ATRIAL RATE: 88 BPM
EKG P AXIS: -19 DEGREES
EKG P AXIS: 38 DEGREES
EKG P-R INTERVAL: 192 MS
EKG P-R INTERVAL: 210 MS
EKG Q-T INTERVAL: 362 MS
EKG Q-T INTERVAL: 414 MS
EKG QRS DURATION: 132 MS
EKG QRS DURATION: 142 MS
EKG QTC CALCULATION (BAZETT): 500 MS
EKG QTC CALCULATION (BAZETT): 526 MS
EKG R AXIS: 31 DEGREES
EKG R AXIS: 6 DEGREES
EKG T AXIS: 121 DEGREES
EKG T AXIS: 41 DEGREES
EKG VENTRICULAR RATE: 127 BPM
EKG VENTRICULAR RATE: 88 BPM

## 2022-11-10 LAB
BLOOD CULTURE, ROUTINE: NORMAL
CULTURE, BLOOD 2: NORMAL

## 2022-12-28 ENCOUNTER — TELEPHONE (OUTPATIENT)
Dept: NON INVASIVE DIAGNOSTICS | Age: 57
End: 2022-12-28

## 2022-12-28 NOTE — TELEPHONE ENCOUNTER
Reviewed carelink from 12/28/2022. Presenting rhythm: AS/VS @ 90  7 hours of AF noted on 12/27/2022. Appears new onset AF. On aspirin 81mg QD and coreg 25mg BID. Discussed with Dr. Miguelina Moffett: call patient to see how he is doing. Schedule appointment in the near future. Called and spoke with patient. He states that he felt increase fatigue yesterday. He denies any palpitations,SOB or chest pain. He states that the symptom of fatigue started after he took the trash out side. He states he is feeling better today. Scheduled office visit for 1/4/2023 with Dr. Miguelina Moffett.        Timothy Willoughby

## 2023-01-04 ENCOUNTER — OFFICE VISIT (OUTPATIENT)
Dept: NON INVASIVE DIAGNOSTICS | Age: 58
End: 2023-01-04
Payer: COMMERCIAL

## 2023-01-04 VITALS
BODY MASS INDEX: 31.69 KG/M2 | HEIGHT: 72 IN | RESPIRATION RATE: 18 BRPM | SYSTOLIC BLOOD PRESSURE: 138 MMHG | WEIGHT: 234 LBS | HEART RATE: 88 BPM | DIASTOLIC BLOOD PRESSURE: 96 MMHG

## 2023-01-04 DIAGNOSIS — I48.91 NEW ONSET ATRIAL FIBRILLATION (HCC): Primary | ICD-10-CM

## 2023-01-04 DIAGNOSIS — R94.31 ABNORMAL EKG: Primary | ICD-10-CM

## 2023-01-04 PROCEDURE — 99215 OFFICE O/P EST HI 40 MIN: CPT | Performed by: INTERNAL MEDICINE

## 2023-01-04 PROCEDURE — 3074F SYST BP LT 130 MM HG: CPT | Performed by: INTERNAL MEDICINE

## 2023-01-04 PROCEDURE — 93283 PRGRMG EVAL IMPLANTABLE DFB: CPT | Performed by: INTERNAL MEDICINE

## 2023-01-04 PROCEDURE — 3078F DIAST BP <80 MM HG: CPT | Performed by: INTERNAL MEDICINE

## 2023-01-04 PROCEDURE — 93290 INTERROG DEV EVAL ICPMS IP: CPT | Performed by: INTERNAL MEDICINE

## 2023-01-04 NOTE — PROGRESS NOTES
Cardiac Electrophysiology Outpatient Progress Note    Luz Gutierrez Sr.  1965  Date of Service: 1/4/2023  Referring Provider/PCP: Jani Babcock MD  Chief Complaint:   Chief Complaint   Patient presents with    Atrial Fibrillation     ICD in situ, cardiomyopathy         Patient Active Problem List    Diagnosis Date Noted    S/P ICD (internal cardiac defibrillator) procedure 10/10/2017     Priority: High    Dyspnea 11/04/2022     Priority: Medium    Wheezing 11/04/2022     Priority: Medium    Fever 11/04/2022     Priority: Medium    Essential hypertension 07/15/2019    Dilated cardiomyopathy ([facility] [address] 11/21/2017    ICD (implantable cardioverter-defibrillator) malfunction, subsequent encounter 11/21/2017    CHF (congestive heart failure) ([facility] [address] 12/12/2016    Chest pain 12/12/2016       PMH    1. Hypertension for about 10 years. 2.  Hospitalization with congestive heart failure in December 2016.  3.  The patient placed on appropriate medical therapy for control of his blood pressure as well as the left ventricular dysfunction at that time. 4.  Implantable cardioverter-defibrillator vest given to him subsequently. 5.  Possible moderate alcohol use. The patient says he drinks about 4 to 5 beers per day. He subsequently discontinued alcohol use after his ICD implant in 2017  6. Implant of a Medtronic Evera MRI SR# [id number], atrial lead is a Medtronic [id number] SR# [id number], ventricular leads Medtronic [id number] SR# [id number] on 10-10-17.     Current Outpatient Medications   Medication Sig Dispense Refill    sacubitril-valsartan (ENTRESTO) 24-26 MG per tablet Take 1 tablet by mouth 2 times daily 180 tablet 3    furosemide (LASIX) 20 MG tablet Take 20 mg by mouth daily       hydrALAZINE (APRESOLINE) 100 MG tablet Take 1 tablet by mouth 3 times daily 60 tablet 3    glimepiride (AMARYL) 2 MG tablet Take 2 mg by mouth every morning (before breakfast)      aspirin 81 MG tablet Take 81 mg by mouth daily carvedilol (COREG) 25 MG tablet Take 1 tablet by mouth 2 times daily (with meals). 60 tablet 0    spironolactone (ALDACTONE) 25 MG tablet Take 25 mg by mouth daily        No current facility-administered medications for this visit. No Known Allergies    SUBJECTIVE: Madeline Cheung presents to the office today for device check and new onset atrial fibrillation. The patient has a history of dilated cardiomyopathy and has been followed in my office since 2017. His last device check in the office was more than a year ago. He denies any recent symptoms of chest pain or shortness of breath on exertion. He was recently hospitalized for respiratory infection with RSV but is completely recovered from the same. The patient recalls the episode of atrial fibrillation on December 27 since he became lightheaded and short of breath. The episode subsided spontaneously. Patient checks his blood pressure twice a week and is usually normal at home--around 120/80  He has discontinued the use of excessive alcohol but smokes marijuana regularly  ROS:   Constitutional: Negative for fever, activity change and appetite change. HENT: Negative for epistaxis, difficulty swallowing. Eyes: Negative for blurred vision or double vision. Respiratory: Negative for cough, chest tightness, shortness of breath and wheezing. Cardiovascular: Negative for chest pain, palpitations and leg swelling. Gastrointestinal: Negative for abdominal pain, heartburn, or blood in stool. Genitourinary: Negative for hematuria, burning or frequency. Musculoskeletal: Negative for myalgias, stiffness, or swelling. Skin: Negative for rash, pain, or lumps. Neurological: Negative for syncope, seizures, or headaches. Psychiatric/Behavioral: Negative for confusion and agitation. The patient is not nervous/anxious.       PHYSICAL EXAM:  Vitals:    01/04/23 1011   BP: (!) 138/96   Pulse: 88   Resp: 18   Weight: 234 lb (106.1 kg)   Height: 6' (1.829 m)     Constitutional: Oriented to person, place, and time. Well-developed and cooperative. Head: Normocephalic and atraumatic. Eyes: Conjunctivae are normal. Pupils are equal, round, and reactive to light. Neck: No hepatojugular reflux and no JVD present. Cardiovascular: Laterally displaced PMI, normal S1 and S2 with left sternal border and the apex, grade 3/6 pansystolic murmur heard at the left sternal border and apex   Pulmonary/Chest: Effort normal and breath sounds normal. No respiratory distress. Abdominal: Soft. Normal appearance and nondistended  Musculoskeletal: Normal range of motion of all extremities, no muscle weakness. Neurological: Alert and oriented to person, place, and time. Gait normal.   Skin: Skin is warm and dry. No bruising, no ecchymosis and no rash noted. Extremity: No clubbing or cyanosis. No edema. Psychiatric: Normal mood and affect.  Thought content normal.     Pertinent Labs:  CBC:   WBC   Date Value Ref Range Status   11/05/2022 5.6 4.5 - 11.5 E9/L Final   11/04/2022 6.9 4.5 - 11.5 E9/L Final   04/13/2020 6.0 4.5 - 11.5 E9/L Final     Hemoglobin   Date Value Ref Range Status   11/05/2022 13.9 12.5 - 16.5 g/dL Final   11/04/2022 14.7 12.5 - 16.5 g/dL Final   04/13/2020 13.7 12.5 - 16.5 g/dL Final     Hematocrit   Date Value Ref Range Status   11/05/2022 42.6 37.0 - 54.0 % Final   11/04/2022 44.4 37.0 - 54.0 % Final   04/13/2020 40.6 37.0 - 54.0 % Final     Platelets   Date Value Ref Range Status   11/05/2022 176 130 - 450 E9/L Final   11/04/2022 178 130 - 450 E9/L Final   04/13/2020 202 130 - 450 E9/L Final     BMP:   Lab Results   Component Value Date/Time     11/05/2022 07:05 AM    K 3.7 11/05/2022 07:05 AM     11/05/2022 07:05 AM    CO2 25 11/05/2022 07:05 AM    BUN 23 11/05/2022 07:05 AM    CREATININE 1.7 11/05/2022 07:05 AM    GLUCOSE 192 11/05/2022 07:05 AM    GLUCOSE 280 01/06/2012 06:35 PM    CALCIUM 9.7 11/05/2022 07:05 AM      ABGs: No results found for: PHART, PO2ART, YGX6CGR  INR:   Lab Results   Component Value Date    INR 1.1 11/04/2019    INR 1.1 11/21/2017    INR 1.1 10/10/2017     BNP: No results found for: BNP  TSH:   Lab Results   Component Value Date    TSH 1.783 12/02/2010      Cardiac Injury Profile: Total CK   Date Value Ref Range Status   04/13/2020 70 20 - 200 U/L Final     CK-MB   Date Value Ref Range Status   04/13/2020 2.1 0.0 - 7.7 ng/mL Final     Troponin   Date Value Ref Range Status   04/13/2020 <0.01 0.00 - 0.03 ng/mL Final     Comment:     TROPONIN T BLOOD LEVELS:         0.03 ng/mL     Upper Reference Limit  0.04 - 0.09 ng/mL     Possible myocardial injury      >= 0.10 ng/mL     Myocardial injury       Lipid Profile:   Lab Results   Component Value Date/Time    TRIG 161 01/05/2012 01:05 PM    HDL 47.0 01/05/2012 01:05 PM    LDLCALC 184 01/05/2012 01:05 PM    CHOL 263 01/05/2012 01:05 PM      Hemoglobin A1C:   Lab Results   Component Value Date    LABA1C 6.0 (H) 11/05/2022         Pertinent Cardiac Testing:     ECG 1/4/2023: NSR,IVCD    Echocardiogram 7/2019  Conclusions      Summary   Left ventricle severely dilated. Global hypokinesis is noted to be severe. Moderate left ventricular concentric hypertrophy noted. Estimated left ventricular ejection fraction is 20-25±5%. Diastolic function cannot be accurately assessed due to significant mitral   regurgitation. The LAESV Index is >34 ml/m2. Defibrillator wire visualized in right ventricle   Increased E point septal separation noted,suggesting decreased LV cardiac   output. Delayed closure of mitral valve suggesting increased end diastolic   pressure. Moderate to severe mitral regurgitation is present. Physiologic and/or trace tricuspid regurgitation. RVSP is 47 mmHg. Pulmonary hypertension is mild . Physiologic and/or trace pulmonic regurgitation present. Technically good quality study.    Technically difficult study due to patient''s body habitus. Compared to prior echo, changes noted. Suggest clinical correlation. Signature      ----------------------------------------------------------------   Electronically signed by Kain Fernandez DO(Interpreting   physician) on 07/16/2019 11:26 AM   ----------------------------------------------------------------    Device Evaluation    Make/model--Medtronic Evera XT  Mode DDDR 50-1 30  P waves   5.4    mV     Impedance      456   ohms   Pacing threshold 0.5 [email or web address]   msec  R waves    more than 20   MV     Impedance    399     ohms   Pacing threshold 0.5 [email or web address]     msec   Pacing percentage  A      1.8%         V   0.1%  High voltage impedance 60 ohms  Battery longevity--4 years, charge time 4 seconds  Arrhythmias--paroxysmal atrial fibrillation. 1 episode was recorded on December 27 lasting for 7 hours  Nonsustained VT occasionally  OptiVol--unremarkable with no evidence of fluid accumulation. Evaluation and reprogramming included --sensor turned off  Overall device function is normal  All  device programmable settings were evaluated per above, along with iterative adjustments (capture thresholds) to assess and select the most appropriate final programming for consistent delivery off the appropriate therapy and to verify function of the device. I have independently reviewed all of the ECGs and rhythm strips per above    I have personally reviewed the laboratory, cardiac diagnostic and radiographic testing as outlined above:        Impression:     HFrEF--LVEF of 20 to 25% known since 2017. Last echo was in 2019  Compliant with medications including Entresto, hydralazine and carvedilol  Continues regular marijuana use however  CHF, functional class II    Hypertension--elevated in the office today.   However the patient admits that he monitors his blood pressure at home and is usually normal on current medical therapy    New onset atrial fibrillation-ICD recorded an episode of 7 hours on December 27, 2022  [id number] score of more than 3    Dual ICD in situ--appropriate function as noted on the check as above      Chronic kidney disease. Most recent serum creatinine of 1.7    Valvular heart disease--mitral regurgitation noted on echocardiography in 2019    Etoh abuse in the past  Marijuana use--continued        Recommendations:    1. Systemic anticoagulation--start Eliquis 5 mg twice a day-risks and benefits associated with systemic anticoagulation discussed with patient in detail. Risk of bleeding with systemic anticoagulation and the risk of cerebrovascular event in case of recurrent atrial fibrillation without the presence of anticoagulation discussed in detail. Patient understands and is willing to proceed with the use of Eliquis as prescribed    2. Stop aspirin    3. Monitor blood pressure at home     4. Repeat echocardiography  5. Consideration for upgrade to a biventricular pacer ICD in the future based on clinical symptoms and EKG. All of the above was discussed with the patient and her  ,>50% of the time involved face-to-face time providing counseling and or coordination of care with the other providers, preparation for the clinic visit, reviewing records/tests, counseling/education of the patient, ordering medications/tests/procedures, coordinating care, and documenting clinical information in the EHR. Thank you for allowing me to participate in your patient's care. Rachel Hernandez MD, [facility]  Cardiac Electrophysiology  [facility]) Physicians  [facility] [place]: [facility]    Addendum  Patient called in to say that Eliquis was too expensive for him and therefore a prescription for Xarelto 15 mg daily was sent to his pharmacy.   If this is also too expensive we will  have to place him on Coumadin    Eva Ramirez MD

## 2023-01-05 ENCOUNTER — TELEPHONE (OUTPATIENT)
Dept: CARDIOLOGY CLINIC | Age: 58
End: 2023-01-05

## 2023-01-05 NOTE — TELEPHONE ENCOUNTER
Patient was put on Eliquis at his appt yesterday, but cannot afford the copay. We do not have samples in office and he doesn't qualify for the copay cards. Please advise.

## 2023-01-11 NOTE — TELEPHONE ENCOUNTER
Spoke to the patient. He is going to use the 30 day free copay card. He is going to call back to see if he qualifies for any of the assistance programs.

## 2023-03-27 ENCOUNTER — HOSPITAL ENCOUNTER (OUTPATIENT)
Dept: CARDIOLOGY | Age: 58
Discharge: HOME | End: 2023-03-27
Payer: COMMERCIAL

## 2023-03-27 DIAGNOSIS — R94.31 ABNORMAL EKG: ICD-10-CM

## 2023-03-27 LAB
LV EF: 25 %
LVEF MODALITY: NORMAL

## 2023-03-27 PROCEDURE — 93306 TTE W/DOPPLER COMPLETE: CPT

## 2023-05-20 ENCOUNTER — HOSPITAL ENCOUNTER (EMERGENCY)
Age: 58
Discharge: HOME | End: 2023-05-20
Payer: COMMERCIAL

## 2023-05-20 VITALS
OXYGEN SATURATION: 98 % | RESPIRATION RATE: 18 BRPM | HEIGHT: 72 IN | HEART RATE: 84 BPM | BODY MASS INDEX: 31.02 KG/M2 | DIASTOLIC BLOOD PRESSURE: 78 MMHG | TEMPERATURE: 98.2 F | SYSTOLIC BLOOD PRESSURE: 134 MMHG | WEIGHT: 229 LBS

## 2023-05-20 DIAGNOSIS — S02.5XXA CLOSED FRACTURE OF TOOTH, INITIAL ENCOUNTER: ICD-10-CM

## 2023-05-20 DIAGNOSIS — K08.89 PAIN, DENTAL: Primary | ICD-10-CM

## 2023-05-20 PROCEDURE — 99283 EMERGENCY DEPT VISIT LOW MDM: CPT

## 2023-05-20 RX ORDER — PENICILLIN V POTASSIUM 500 MG/1
500 TABLET ORAL 4 TIMES DAILY
Qty: 40 TABLET | Refills: 0 | Status: SHIPPED | OUTPATIENT
Start: 2023-05-20 | End: 2023-05-30

## 2023-05-20 RX ORDER — IBUPROFEN 600 MG/1
600 TABLET ORAL 4 TIMES DAILY PRN
Qty: 40 TABLET | Refills: 0 | Status: SHIPPED | OUTPATIENT
Start: 2023-05-20

## 2023-05-20 ASSESSMENT — PAIN SCALES - GENERAL: PAINLEVEL_OUTOF10: 10

## 2023-05-20 ASSESSMENT — PAIN - FUNCTIONAL ASSESSMENT
PAIN_FUNCTIONAL_ASSESSMENT: NONE - DENIES PAIN
PAIN_FUNCTIONAL_ASSESSMENT: 0-10

## 2023-05-20 ASSESSMENT — PAIN DESCRIPTION - LOCATION: LOCATION: TEETH

## 2023-05-20 ASSESSMENT — PAIN DESCRIPTION - ORIENTATION: ORIENTATION: LEFT;UPPER

## 2023-05-20 ASSESSMENT — PAIN DESCRIPTION - DESCRIPTORS: DESCRIPTORS: ACHING

## 2023-05-20 NOTE — DISCHARGE INSTRUCTIONS
Take the Ibuprofen as directed for pain. Alternate with Tylenol. Take the antibiotics as directed. Go to the Dental Clinic on Monday morning.

## 2023-05-20 NOTE — ED PROVIDER NOTES
Brendon Harding. Family History: family history includes Heart Failure in his mother; Hypertension in his mother. Allergies: Patient has no known allergies. Physical Exam   Oxygen Saturation Interpretation: Normal.        ED Triage Vitals [05/20/23 1239]   BP Temp Temp src Pulse Respirations SpO2 Height Weight - Scale   (!) 142/98 97 °F (36.1 °C) -- 91 16 99 % 6' (1.829 m) 229 lb (103.9 kg)         Constitutional:  Alert, development consistent with age. HEENT:  NC/NT. Airway patent. Neck:  Supple. Normal ROM. Lips:  upper and lower normal.  Mouth:  normal tongue and buccal mucosa. Dental:  fractured tooth #12. No visualized abscess, bleeding or drainage. Physical Exam                  Trismus: No.         Drooling: No.           Airway stridor: No.  Facial skin: bilateral no wounds, erythema, or swelling. Respiratory:  Respirations regular, non-labored, no tachypnea. CV: Skin warm, dry. Skin:  No rashes, erythema or lesions present, unless noted elsewhere. .  Lymphatics: No lymphangitis or adenopathy noted. Neurological:  Oriented. Motor functions intact. GCS 15, fluent speech, ambulatory, PELAEZ x4.     Lab / Imaging Results   (All laboratory and radiology results have been personally reviewed by myself)  Labs:  No results found for this visit on 05/20/23. Imaging: All Radiology results interpreted by Radiologist unless otherwise noted. No orders to display     ED Course / Medical Decision Making   Medications - No data to display     Consult(s):   Dental Resident was not consulted to see patient regarding complaint. Procedure(s):   None    MDM:  61-year-old male who presented the emergency department for left upper dental pain that occurred 1 month prior to arrival.  He states he chipped his tooth. Pain has been persistent. It is worse by chewing. No other symptoms. No fevers or chills.     Differential Diagnosis but not limited to:  dental fracture, dental abscess, gingivitis    On exam, he

## 2023-06-15 ENCOUNTER — HOSPITAL ENCOUNTER (EMERGENCY)
Age: 58
Discharge: HOME | End: 2023-06-15
Attending: EMERGENCY MEDICINE
Payer: COMMERCIAL

## 2023-06-15 VITALS
BODY MASS INDEX: 30.48 KG/M2 | TEMPERATURE: 97.8 F | WEIGHT: 225 LBS | HEIGHT: 72 IN | OXYGEN SATURATION: 100 % | RESPIRATION RATE: 16 BRPM | SYSTOLIC BLOOD PRESSURE: 131 MMHG | DIASTOLIC BLOOD PRESSURE: 74 MMHG | HEART RATE: 87 BPM

## 2023-06-15 DIAGNOSIS — I50.9 CONGESTIVE HEART FAILURE, UNSPECIFIED HF CHRONICITY, UNSPECIFIED HEART FAILURE TYPE (HCC): Primary | ICD-10-CM

## 2023-06-15 PROCEDURE — 6360000002 HC RX W HCPCS: Performed by: EMERGENCY MEDICINE

## 2023-06-15 PROCEDURE — 96374 THER/PROPH/DIAG INJ IV PUSH: CPT

## 2023-06-15 PROCEDURE — 99284 EMERGENCY DEPT VISIT MOD MDM: CPT

## 2023-06-15 RX ORDER — FUROSEMIDE 10 MG/ML
40 INJECTION INTRAMUSCULAR; INTRAVENOUS ONCE
Status: COMPLETED | OUTPATIENT
Start: 2023-06-15 | End: 2023-06-15

## 2023-06-15 RX ADMIN — FUROSEMIDE 40 MG: 10 INJECTION, SOLUTION INTRAMUSCULAR; INTRAVENOUS at 15:26

## 2023-06-15 ASSESSMENT — LIFESTYLE VARIABLES
HOW OFTEN DO YOU HAVE A DRINK CONTAINING ALCOHOL: NEVER
HOW MANY STANDARD DRINKS CONTAINING ALCOHOL DO YOU HAVE ON A TYPICAL DAY: PATIENT DOES NOT DRINK

## 2023-06-15 ASSESSMENT — PAIN SCALES - GENERAL: PAINLEVEL_OUTOF10: 5

## 2023-06-15 ASSESSMENT — PAIN SCALES - WONG BAKER: WONGBAKER_NUMERICALRESPONSE: 0

## 2023-06-15 ASSESSMENT — PAIN - FUNCTIONAL ASSESSMENT
PAIN_FUNCTIONAL_ASSESSMENT: NONE - DENIES PAIN
PAIN_FUNCTIONAL_ASSESSMENT: 0-10
PAIN_FUNCTIONAL_ASSESSMENT: NONE - DENIES PAIN

## 2023-06-15 NOTE — ED PROVIDER NOTES
HPI:  6/15/23,   Time: 3:32 PM EDT         Ruthanne Mcburney Sr. is a 56 y.o. male presenting to the ED for evaluation of chest congestion. He states he has a history of chronic congestive heart failure for which he has been taking his prescribed medication although he feels as if he is gotten slightly behind in his congestion. He is here seeking only a dose of furosemide. Denies shortness of breath at rest.  States he is not dyspneic with exertion. He states he only feels slight congestion. Denies extremity edema or chest pain. ROS:   Pertinent positives and negatives are stated within HPI, all other systems reviewed and are negative.  --------------------------------------------- PAST HISTORY ---------------------------------------------  Past Medical History:  has a past medical history of Cerebral artery occlusion with cerebral infarction ([facility] [address] CHF (congestive heart failure) ([facility] [address] Colon cancer screening, Dilated cardiomyopathy ([facility] [address] Hypertension, Nonrheumatic mitral (valve) insufficiency, and Nonrheumatic tricuspid (valve) insufficiency. Past Surgical History:  has a past surgical history that includes Cardiac defibrillator placement (Left, 10/10/2017); pacemaker placement; Tonsillectomy; and Colonoscopy (N/A, 1/20/2021). Social History:  reports that he has never smoked. He has never used smokeless tobacco. He reports that he does not currently use alcohol. He reports current drug use. Frequency: 7.00 times per week. Drug: Marijuana Mignon Marimar). Family History: family history includes Heart Failure in his mother; Hypertension in his mother. The patients home medications have been reviewed. Allergies: Patient has no known allergies.     -------------------------------------------------- RESULTS -------------------------------------------------  All laboratory and radiology results have been personally reviewed by myself   LABS:  No results found for this visit on

## 2023-06-26 ENCOUNTER — OFFICE VISIT (OUTPATIENT)
Dept: CARDIOLOGY CLINIC | Age: 58
End: 2023-06-26
Payer: COMMERCIAL

## 2023-06-26 VITALS
OXYGEN SATURATION: 94 % | HEART RATE: 73 BPM | BODY MASS INDEX: 30.94 KG/M2 | SYSTOLIC BLOOD PRESSURE: 120 MMHG | WEIGHT: 228.4 LBS | RESPIRATION RATE: 18 BRPM | DIASTOLIC BLOOD PRESSURE: 64 MMHG | HEIGHT: 72 IN

## 2023-06-26 DIAGNOSIS — I42.8 NICM (NONISCHEMIC CARDIOMYOPATHY) (HCC): Primary | ICD-10-CM

## 2023-06-26 PROCEDURE — 3078F DIAST BP <80 MM HG: CPT | Performed by: INTERNAL MEDICINE

## 2023-06-26 PROCEDURE — 93000 ELECTROCARDIOGRAM COMPLETE: CPT | Performed by: INTERNAL MEDICINE

## 2023-06-26 PROCEDURE — 3074F SYST BP LT 130 MM HG: CPT | Performed by: INTERNAL MEDICINE

## 2023-06-26 PROCEDURE — 99214 OFFICE O/P EST MOD 30 MIN: CPT | Performed by: INTERNAL MEDICINE

## 2023-06-27 ENCOUNTER — TELEPHONE (OUTPATIENT)
Dept: CARDIOLOGY CLINIC | Age: 58
End: 2023-06-27

## 2023-07-20 PROCEDURE — 93297 REM INTERROG DEV EVAL ICPMS: CPT | Performed by: INTERNAL MEDICINE

## 2023-07-20 PROCEDURE — 93296 REM INTERROG EVL PM/IDS: CPT | Performed by: INTERNAL MEDICINE

## 2023-07-20 PROCEDURE — 93295 DEV INTERROG REMOTE 1/2/MLT: CPT | Performed by: INTERNAL MEDICINE

## 2023-07-20 PROCEDURE — G2066 INTER DEVC REMOTE 30D: HCPCS | Performed by: INTERNAL MEDICINE

## 2023-10-19 PROCEDURE — 93295 DEV INTERROG REMOTE 1/2/MLT: CPT | Performed by: INTERNAL MEDICINE

## 2023-10-19 PROCEDURE — 93297 REM INTERROG DEV EVAL ICPMS: CPT | Performed by: INTERNAL MEDICINE

## 2023-10-19 PROCEDURE — 93296 REM INTERROG EVL PM/IDS: CPT | Performed by: INTERNAL MEDICINE

## 2023-10-19 PROCEDURE — G2066 INTER DEVC REMOTE 30D: HCPCS | Performed by: INTERNAL MEDICINE

## 2023-12-13 ENCOUNTER — OFFICE VISIT (OUTPATIENT)
Dept: CARDIOLOGY CLINIC | Age: 58
End: 2023-12-13
Payer: COMMERCIAL

## 2023-12-13 VITALS
RESPIRATION RATE: 16 BRPM | BODY MASS INDEX: 30.53 KG/M2 | HEART RATE: 75 BPM | WEIGHT: 225.4 LBS | HEIGHT: 72 IN | SYSTOLIC BLOOD PRESSURE: 124 MMHG | OXYGEN SATURATION: 99 % | DIASTOLIC BLOOD PRESSURE: 68 MMHG

## 2023-12-13 DIAGNOSIS — I42.8 NICM (NONISCHEMIC CARDIOMYOPATHY) (HCC): Primary | ICD-10-CM

## 2023-12-13 PROCEDURE — 99214 OFFICE O/P EST MOD 30 MIN: CPT | Performed by: INTERNAL MEDICINE

## 2023-12-13 PROCEDURE — 3078F DIAST BP <80 MM HG: CPT | Performed by: INTERNAL MEDICINE

## 2023-12-13 PROCEDURE — 3074F SYST BP LT 130 MM HG: CPT | Performed by: INTERNAL MEDICINE

## 2023-12-13 PROCEDURE — 93000 ELECTROCARDIOGRAM COMPLETE: CPT | Performed by: INTERNAL MEDICINE

## 2024-02-01 PROCEDURE — 93297 REM INTERROG DEV EVAL ICPMS: CPT | Performed by: INTERNAL MEDICINE

## 2024-02-01 PROCEDURE — 93296 REM INTERROG EVL PM/IDS: CPT | Performed by: INTERNAL MEDICINE

## 2024-02-01 PROCEDURE — 93295 DEV INTERROG REMOTE 1/2/MLT: CPT | Performed by: INTERNAL MEDICINE

## 2024-02-23 ENCOUNTER — OFFICE VISIT (OUTPATIENT)
Dept: NON INVASIVE DIAGNOSTICS | Age: 59
End: 2024-02-23

## 2024-02-23 VITALS
SYSTOLIC BLOOD PRESSURE: 142 MMHG | BODY MASS INDEX: 31.48 KG/M2 | HEIGHT: 72 IN | HEART RATE: 76 BPM | OXYGEN SATURATION: 96 % | WEIGHT: 232.4 LBS | RESPIRATION RATE: 18 BRPM | DIASTOLIC BLOOD PRESSURE: 102 MMHG

## 2024-02-23 DIAGNOSIS — I50.9 CONGESTIVE HEART FAILURE, UNSPECIFIED HF CHRONICITY, UNSPECIFIED HEART FAILURE TYPE (HCC): Primary | ICD-10-CM

## 2024-02-23 NOTE — PROGRESS NOTES
Cardiac Electrophysiology Outpatient Progress Note    Maryjo GARCIA Jason Lopez.  1965  Date of Service: 2/23/2024  Referring Provider/PCP: Giuseppe Cote MD  Chief Complaint   Patient presents with    Atrial Fibrillation             Patient Active Problem List    Diagnosis Date Noted    S/P ICD (internal cardiac defibrillator) procedure 10/10/2017     Priority: High    Dyspnea 11/04/2022     Priority: Medium    Wheezing 11/04/2022     Priority: Medium    Fever 11/04/2022     Priority: Medium    Essential hypertension 07/15/2019    Dilated cardiomyopathy (HCC) 11/21/2017    ICD (implantable cardioverter-defibrillator) malfunction, subsequent encounter 11/21/2017    CHF (congestive heart failure) (HCC) 12/12/2016    Chest pain 12/12/2016       PMH    1.  Hypertension for about 10 years.    2.  Hospitalization with congestive heart failure in December 2016.  3.  The patient placed on appropriate medical therapy for control of his blood pressure as well as the left ventricular dysfunction at that time.  4.  Implantable cardioverter-defibrillator vest given to him subsequently.    5.  Possible moderate alcohol use.  The patient says he drinks about 4 to 5 beers per day.  He subsequently discontinued alcohol use after his ICD implant in 2017  6.  Implant of a Medtronic Evera MRI SR# [id number], atrial lead is a Medtronic 5076 SR# [id number], ventricular leads Medtronic 6935 SR# [id number] on 10-10-17.    Current Outpatient Medications   Medication Sig Dispense Refill    sacubitril-valsartan (ENTRESTO) 49-51 MG per tablet Take 1 tablet by mouth 2 times daily 180 tablet 3    rivaroxaban (XARELTO) 15 MG TABS tablet Take 1 tablet by mouth daily (with breakfast) 30 tablet 3    furosemide (LASIX) 20 MG tablet Take 1 tablet by mouth daily      hydrALAZINE (APRESOLINE) 100 MG tablet Take 1 tablet by mouth 3 times daily 60 tablet 3    glimepiride (AMARYL) 2 MG tablet Take 1 tablet by mouth every morning (before

## 2024-03-01 ENCOUNTER — HOSPITAL ENCOUNTER (EMERGENCY)
Age: 59
Discharge: HOME | End: 2024-03-01
Attending: EMERGENCY MEDICINE
Payer: COMMERCIAL

## 2024-03-01 ENCOUNTER — APPOINTMENT (OUTPATIENT)
Dept: GENERAL RADIOLOGY | Age: 59
End: 2024-03-01
Payer: COMMERCIAL

## 2024-03-01 VITALS
HEIGHT: 72 IN | SYSTOLIC BLOOD PRESSURE: 148 MMHG | RESPIRATION RATE: 16 BRPM | BODY MASS INDEX: 30.48 KG/M2 | TEMPERATURE: 98.2 F | HEART RATE: 79 BPM | WEIGHT: 225 LBS | OXYGEN SATURATION: 98 % | DIASTOLIC BLOOD PRESSURE: 98 MMHG

## 2024-03-01 DIAGNOSIS — I10 HYPERTENSION, UNSPECIFIED TYPE: ICD-10-CM

## 2024-03-01 DIAGNOSIS — I50.9 CHRONIC CONGESTIVE HEART FAILURE, UNSPECIFIED HEART FAILURE TYPE (HCC): Primary | ICD-10-CM

## 2024-03-01 DIAGNOSIS — Z91.148 NON COMPLIANCE W MEDICATION REGIMEN: ICD-10-CM

## 2024-03-01 LAB
ALBUMIN SERPL-MCNC: 4.1 G/DL (ref 3.5–5.2)
ALP SERPL-CCNC: 69 U/L (ref 40–129)
ALT SERPL-CCNC: <5 U/L (ref 0–40)
ANION GAP SERPL CALCULATED.3IONS-SCNC: 12 MMOL/L (ref 7–16)
AST SERPL-CCNC: 11 U/L (ref 0–39)
BASOPHILS # BLD: 0.04 K/UL (ref 0–0.2)
BASOPHILS NFR BLD: 1 % (ref 0–2)
BILIRUB SERPL-MCNC: 1.9 MG/DL (ref 0–1.2)
BNP SERPL-MCNC: 8229 PG/ML (ref 0–125)
BUN SERPL-MCNC: 24 MG/DL (ref 6–20)
CALCIUM SERPL-MCNC: 9.5 MG/DL (ref 8.6–10.2)
CHLORIDE SERPL-SCNC: 102 MMOL/L (ref 98–107)
CO2 SERPL-SCNC: 28 MMOL/L (ref 22–29)
CREAT SERPL-MCNC: 1.8 MG/DL (ref 0.7–1.2)
EOSINOPHIL # BLD: 0.11 K/UL (ref 0.05–0.5)
EOSINOPHILS RELATIVE PERCENT: 2 % (ref 0–6)
ERYTHROCYTE [DISTWIDTH] IN BLOOD BY AUTOMATED COUNT: 13.3 % (ref 11.5–15)
GFR SERPL CREATININE-BSD FRML MDRD: 43 ML/MIN/1.73M2
GLUCOSE SERPL-MCNC: 111 MG/DL (ref 74–99)
HCT VFR BLD AUTO: 41.8 % (ref 37–54)
HGB BLD-MCNC: 13.9 G/DL (ref 12.5–16.5)
IMM GRANULOCYTES # BLD AUTO: <0.03 K/UL (ref 0–0.58)
IMM GRANULOCYTES NFR BLD: 0 % (ref 0–5)
LYMPHOCYTES NFR BLD: 1.06 K/UL (ref 1.5–4)
LYMPHOCYTES RELATIVE PERCENT: 22 % (ref 20–42)
MCH RBC QN AUTO: 29.1 PG (ref 26–35)
MCHC RBC AUTO-ENTMCNC: 33.3 G/DL (ref 32–34.5)
MCV RBC AUTO: 87.6 FL (ref 80–99.9)
MONOCYTES NFR BLD: 0.29 K/UL (ref 0.1–0.95)
MONOCYTES NFR BLD: 6 % (ref 2–12)
NEUTROPHILS NFR BLD: 69 % (ref 43–80)
NEUTS SEG NFR BLD: 3.31 K/UL (ref 1.8–7.3)
PLATELET # BLD AUTO: 200 K/UL (ref 130–450)
PMV BLD AUTO: 10.1 FL (ref 7–12)
POTASSIUM SERPL-SCNC: 3.5 MMOL/L (ref 3.5–5)
PROT SERPL-MCNC: 7.4 G/DL (ref 6.4–8.3)
RBC # BLD AUTO: 4.77 M/UL (ref 3.8–5.8)
SODIUM SERPL-SCNC: 142 MMOL/L (ref 132–146)
TROPONIN I SERPL HS-MCNC: 27 NG/L (ref 0–11)
TROPONIN I SERPL HS-MCNC: 27 NG/L (ref 0–11)
WBC OTHER # BLD: 4.8 K/UL (ref 4.5–11.5)

## 2024-03-01 PROCEDURE — 6360000002 HC RX W HCPCS: Performed by: EMERGENCY MEDICINE

## 2024-03-01 PROCEDURE — 84484 ASSAY OF TROPONIN QUANT: CPT

## 2024-03-01 PROCEDURE — 71046 X-RAY EXAM CHEST 2 VIEWS: CPT

## 2024-03-01 PROCEDURE — 93005 ELECTROCARDIOGRAM TRACING: CPT | Performed by: EMERGENCY MEDICINE

## 2024-03-01 PROCEDURE — 99285 EMERGENCY DEPT VISIT HI MDM: CPT

## 2024-03-01 PROCEDURE — 80053 COMPREHEN METABOLIC PANEL: CPT

## 2024-03-01 PROCEDURE — 83880 ASSAY OF NATRIURETIC PEPTIDE: CPT

## 2024-03-01 PROCEDURE — 96374 THER/PROPH/DIAG INJ IV PUSH: CPT

## 2024-03-01 PROCEDURE — 96375 TX/PRO/DX INJ NEW DRUG ADDON: CPT

## 2024-03-01 PROCEDURE — 96376 TX/PRO/DX INJ SAME DRUG ADON: CPT

## 2024-03-01 PROCEDURE — 85025 COMPLETE CBC W/AUTO DIFF WBC: CPT

## 2024-03-01 RX ORDER — HYDRALAZINE HYDROCHLORIDE 20 MG/ML
10 INJECTION INTRAMUSCULAR; INTRAVENOUS ONCE
Status: COMPLETED | OUTPATIENT
Start: 2024-03-01 | End: 2024-03-01

## 2024-03-01 RX ORDER — FUROSEMIDE 10 MG/ML
40 INJECTION INTRAMUSCULAR; INTRAVENOUS ONCE
Status: COMPLETED | OUTPATIENT
Start: 2024-03-01 | End: 2024-03-01

## 2024-03-01 RX ADMIN — FUROSEMIDE 40 MG: 10 INJECTION, SOLUTION INTRAMUSCULAR; INTRAVENOUS at 19:05

## 2024-03-01 RX ADMIN — HYDRALAZINE HYDROCHLORIDE 10 MG: 20 INJECTION INTRAMUSCULAR; INTRAVENOUS at 20:49

## 2024-03-01 RX ADMIN — HYDRALAZINE HYDROCHLORIDE 10 MG: 20 INJECTION INTRAMUSCULAR; INTRAVENOUS at 21:28

## 2024-03-01 NOTE — ED PROVIDER NOTES
[facility] EMERGENCY DEPARTMENT  EMERGENCY DEPARTMENT ENCOUNTER        Pt Name: Maryjo Gomez Sr.  MRN: [id number]  Birthdate 1965  Date of evaluation: 3/1/2024  Provider: Sandra Watkins MD  PCP: Giuseppe Cote MD  Note Started: 4:00 PM EST 3/1/24    CHIEF COMPLAINT       Chief Complaint   Patient presents with    Chest Pain       HISTORY OF PRESENT ILLNESS: 1 or more Elements   History From: Patient    Limitations to history : None    Maryjo Gomez Sr. is a 58 y.o. male who presents with complaints of shortness of breath with laying flat the last 3 days.  Patient denies any chest pain.  Admits has not been taking his Lasix because it makes him urinate during the day at work.  He prefers not to take it.  No increased leg swelling.  Patient states he just \"thinks I have fluid on my lungs and need a shot of Lasix\".  Patient denies any cough or congestion no pleuritic pain.  Has a history of CHF as well as pacemaker for dilated cardiomyopathy.  No exertional chest pain or shortness of breath.  No fevers or chills.    Nursing Notes were all reviewed and agreed with or any disagreements were addressed in the HPI.      REVIEW OF EXTERNAL NOTE :       ***    REVIEW OF SYSTEMS :           Positives and Pertinent negatives as per HPI.     SURGICAL HISTORY     Past Surgical History:   Procedure Laterality Date    CARDIAC DEFIBRILLATOR PLACEMENT Left 10/10/2017    Medtronic Dual ICD,     COLONOSCOPY N/A 1/20/2021    COLONOSCOPY WITH BIOPSY performed by Marlon Waters MD at [facility] ENDOSCOPY    PACEMAKER PLACEMENT      TONSILLECTOMY         CURRENTMEDICATIONS       Previous Medications    CARVEDILOL (COREG) 25 MG TABLET    Take 1 tablet by mouth 2 times daily (with meals).    FUROSEMIDE (LASIX) 20 MG TABLET    Take 1 tablet by mouth daily    GLIMEPIRIDE (AMARYL) 2 MG TABLET    Take 1 tablet by mouth every morning (before breakfast)    HYDRALAZINE (APRESOLINE) 100 MG

## 2024-03-04 LAB
EKG ATRIAL RATE: 80 BPM
EKG P AXIS: 66 DEGREES
EKG P-R INTERVAL: 210 MS
EKG Q-T INTERVAL: 434 MS
EKG QRS DURATION: 138 MS
EKG QTC CALCULATION (BAZETT): 500 MS
EKG R AXIS: -8 DEGREES
EKG T AXIS: 107 DEGREES
EKG VENTRICULAR RATE: 80 BPM

## 2024-03-04 PROCEDURE — 93010 ELECTROCARDIOGRAM REPORT: CPT | Performed by: INTERNAL MEDICINE

## 2024-05-13 PROCEDURE — 93295 DEV INTERROG REMOTE 1/2/MLT: CPT | Performed by: INTERNAL MEDICINE

## 2024-05-13 PROCEDURE — 93297 REM INTERROG DEV EVAL ICPMS: CPT | Performed by: INTERNAL MEDICINE

## 2024-05-13 PROCEDURE — 93296 REM INTERROG EVL PM/IDS: CPT | Performed by: INTERNAL MEDICINE

## 2024-06-14 ENCOUNTER — OFFICE VISIT (OUTPATIENT)
Dept: CARDIOLOGY CLINIC | Age: 59
End: 2024-06-14
Payer: COMMERCIAL

## 2024-06-14 VITALS
HEIGHT: 72 IN | SYSTOLIC BLOOD PRESSURE: 138 MMHG | OXYGEN SATURATION: 97 % | BODY MASS INDEX: 31.72 KG/M2 | WEIGHT: 234.2 LBS | HEART RATE: 72 BPM | RESPIRATION RATE: 18 BRPM | DIASTOLIC BLOOD PRESSURE: 84 MMHG

## 2024-06-14 DIAGNOSIS — I42.8 NICM (NONISCHEMIC CARDIOMYOPATHY) (HCC): ICD-10-CM

## 2024-06-14 DIAGNOSIS — I50.9 CONGESTIVE HEART FAILURE, UNSPECIFIED HF CHRONICITY, UNSPECIFIED HEART FAILURE TYPE (HCC): Primary | ICD-10-CM

## 2024-06-14 PROCEDURE — 99214 OFFICE O/P EST MOD 30 MIN: CPT | Performed by: INTERNAL MEDICINE

## 2024-06-14 PROCEDURE — 3075F SYST BP GE 130 - 139MM HG: CPT | Performed by: INTERNAL MEDICINE

## 2024-06-14 PROCEDURE — 93000 ELECTROCARDIOGRAM COMPLETE: CPT | Performed by: INTERNAL MEDICINE

## 2024-06-14 PROCEDURE — 3079F DIAST BP 80-89 MM HG: CPT | Performed by: INTERNAL MEDICINE

## 2024-06-14 NOTE — PROGRESS NOTES
Cardiology Progress Note  Dr. Lula Sethi      Referring Physician: Dr. Giuseppe Cote  CHIEF COMPLAINT:   Chief Complaint   Patient presents with    Congestive Heart Failure     6 mo office.pt has no C/C    Cardiomyopathy    Hypertension     HISTORY OF PRESENT ILLNESS:   Patient is a 58 year old male with significant past medical history of nonischemic cardiomyopathy, AICD in situ, hypertension, diabetes mellitus, is here for follow-up appointment.    Patient denies any chest pain, no shortness of breath, no lightheadedness, no dizziness, no palpitations, no pedal edema, no PND, no orthopnea, no syncope, no presyncopal episodes.  Functional capacity is at baseline    Past Medical History:   Diagnosis Date    Cerebral artery occlusion with cerebral infarction (HCC) 2017    no residual    CHF (congestive heart failure) ([facility])     Colon cancer screening     Dilated cardiomyopathy (HCC)     Follows with Dr. Sethi-last OV     Hypertension     Nonrheumatic mitral (valve) insufficiency     Nonrheumatic tricuspid (valve) insufficiency          Past Surgical History:   Procedure Laterality Date    CARDIAC DEFIBRILLATOR PLACEMENT Left 10/10/2017    Medtronic Dual ICD,     COLONOSCOPY N/A 1/20/2021    COLONOSCOPY WITH BIOPSY performed by Marlon Waters MD at [facility] ENDOSCOPY    PACEMAKER PLACEMENT      TONSILLECTOMY           Current Outpatient Medications   Medication Sig Dispense Refill    sacubitril-valsartan (ENTRESTO) 49-51 MG per tablet Take 1 tablet by mouth 2 times daily 180 tablet 3    rivaroxaban (XARELTO) 15 MG TABS tablet Take 1 tablet by mouth daily (with breakfast) 30 tablet 3    furosemide (LASIX) 20 MG tablet Take 1 tablet by mouth daily      hydrALAZINE (APRESOLINE) 100 MG tablet Take 1 tablet by mouth 3 times daily 60 tablet 3    glimepiride (AMARYL) 2 MG tablet Take 1 tablet by mouth every morning (before breakfast)      carvedilol (COREG) 25 MG tablet Take 1 tablet by mouth 2 times

## 2024-08-10 ENCOUNTER — HOSPITAL ENCOUNTER (EMERGENCY)
Age: 59
Discharge: HOME | End: 2024-08-10
Payer: COMMERCIAL

## 2024-08-10 VITALS
RESPIRATION RATE: 17 BRPM | SYSTOLIC BLOOD PRESSURE: 148 MMHG | OXYGEN SATURATION: 98 % | DIASTOLIC BLOOD PRESSURE: 109 MMHG | WEIGHT: 230 LBS | BODY MASS INDEX: 31.19 KG/M2 | TEMPERATURE: 98.2 F | HEART RATE: 76 BPM

## 2024-08-10 DIAGNOSIS — K08.89 PAIN, DENTAL: Primary | ICD-10-CM

## 2024-08-10 PROCEDURE — 99283 EMERGENCY DEPT VISIT LOW MDM: CPT

## 2024-08-10 PROCEDURE — 6370000000 HC RX 637 (ALT 250 FOR IP): Performed by: NURSE PRACTITIONER

## 2024-08-10 RX ORDER — AMOXICILLIN AND CLAVULANATE POTASSIUM 875; 125 MG/1; MG/1
1 TABLET, FILM COATED ORAL 2 TIMES DAILY
Qty: 20 TABLET | Refills: 0 | Status: SHIPPED | OUTPATIENT
Start: 2024-08-10 | End: 2024-08-20

## 2024-08-10 RX ORDER — AMOXICILLIN AND CLAVULANATE POTASSIUM 875; 125 MG/1; MG/1
1 TABLET, FILM COATED ORAL ONCE
Status: COMPLETED | OUTPATIENT
Start: 2024-08-10 | End: 2024-08-10

## 2024-08-10 RX ORDER — HYDROCODONE BITARTRATE AND ACETAMINOPHEN 5; 325 MG/1; MG/1
1 TABLET ORAL ONCE
Status: COMPLETED | OUTPATIENT
Start: 2024-08-10 | End: 2024-08-10

## 2024-08-10 RX ADMIN — AMOXICILLIN AND CLAVULANATE POTASSIUM 1 TABLET: 875; 125 TABLET, FILM COATED ORAL at 18:13

## 2024-08-10 RX ADMIN — HYDROCODONE BITARTRATE AND ACETAMINOPHEN 1 TABLET: 5; 325 TABLET ORAL at 18:12

## 2024-08-10 ASSESSMENT — PAIN DESCRIPTION - ORIENTATION
ORIENTATION: LEFT
ORIENTATION: LEFT;LOWER

## 2024-08-10 ASSESSMENT — PAIN - FUNCTIONAL ASSESSMENT
PAIN_FUNCTIONAL_ASSESSMENT: 0-10
PAIN_FUNCTIONAL_ASSESSMENT: ACTIVITIES ARE NOT PREVENTED

## 2024-08-10 ASSESSMENT — PAIN DESCRIPTION - DESCRIPTORS: DESCRIPTORS: ACHING

## 2024-08-10 ASSESSMENT — PAIN DESCRIPTION - LOCATION
LOCATION: TEETH
LOCATION: TEETH

## 2024-08-10 ASSESSMENT — PAIN SCALES - GENERAL
PAINLEVEL_OUTOF10: 10
PAINLEVEL_OUTOF10: 9

## 2024-08-10 ASSESSMENT — LIFESTYLE VARIABLES
HOW MANY STANDARD DRINKS CONTAINING ALCOHOL DO YOU HAVE ON A TYPICAL DAY: PATIENT DOES NOT DRINK
HOW OFTEN DO YOU HAVE A DRINK CONTAINING ALCOHOL: NEVER

## 2024-08-10 NOTE — ED PROVIDER NOTES
Independent DONNA Visit.       [facility]  Department of Emergency Medicine   ED  Encounter Note  Admit Date/RoomTime: 8/10/2024  5:53 PM  ED Room:     NAME: Maryjo Gomez Sr.  : 1965  MRN: [id number]     Chief Complaint:  Dental Pain (Dental pain (left lower) x 1 week)    History of Present Illness        Maryjo Gomez Sr. is a 58 y.o. old male who presents to the emergency department by private vehicle, for non-traumatic left lower dental pain, which occured 1 week(s) prior to arrival.  Since onset the symptoms have been remaining constant and mild-moderate in severity.  Worsened by  chewing and improved by nothing.  Associated Signs & Symptoms:  facial swelling.  Patient denies any shortness of breath, chest pain, lightness, dizziness, ear pain.  He states that he had a filling that has fell out and is causing him pain.  Patient is currently on Xarelto.           Onset:       Spontaneous:   yes.     Following Trauma:   no.     Previous Caries:   yes.     Recent Dental Procedure:   no.     ROS   Pertinent positives and negatives are stated within HPI, all other systems reviewed and are negative.    Past Medical History:  has a past medical history of Cerebral artery occlusion with cerebral infarction (HCC), CHF (congestive heart failure) (HCC), Colon cancer screening, Dilated cardiomyopathy (HCC), Hypertension, Nonrheumatic mitral (valve) insufficiency, and Nonrheumatic tricuspid (valve) insufficiency.    Surgical History:  has a past surgical history that includes Cardiac defibrillator placement (Left, 10/10/2017); pacemaker placement; Tonsillectomy; and Colonoscopy (N/A, 2021).    Social History:  reports that he has never smoked. He has been exposed to tobacco smoke. He has never used smokeless tobacco. He reports that he does not currently use alcohol. He reports current drug use. Frequency: 7.00 times per week. Drug: Marijuana (Weed).    Family History: family history

## 2024-08-18 PROCEDURE — 93297 REM INTERROG DEV EVAL ICPMS: CPT | Performed by: INTERNAL MEDICINE

## 2024-08-18 PROCEDURE — 93295 DEV INTERROG REMOTE 1/2/MLT: CPT | Performed by: INTERNAL MEDICINE

## 2024-08-18 PROCEDURE — 93296 REM INTERROG EVL PM/IDS: CPT | Performed by: INTERNAL MEDICINE

## 2024-11-19 ENCOUNTER — HOSPITAL ENCOUNTER (EMERGENCY)
Age: 59
Discharge: HOME | End: 2024-11-19
Attending: EMERGENCY MEDICINE
Payer: COMMERCIAL

## 2024-11-19 ENCOUNTER — APPOINTMENT (OUTPATIENT)
Dept: GENERAL RADIOLOGY | Age: 59
End: 2024-11-19
Payer: COMMERCIAL

## 2024-11-19 VITALS
DIASTOLIC BLOOD PRESSURE: 91 MMHG | RESPIRATION RATE: 18 BRPM | HEIGHT: 72 IN | HEART RATE: 69 BPM | TEMPERATURE: 97.5 F | SYSTOLIC BLOOD PRESSURE: 107 MMHG | WEIGHT: 215 LBS | BODY MASS INDEX: 29.12 KG/M2 | OXYGEN SATURATION: 99 %

## 2024-11-19 DIAGNOSIS — E87.6 HYPOKALEMIA: ICD-10-CM

## 2024-11-19 DIAGNOSIS — I50.9 ACUTE ON CHRONIC CONGESTIVE HEART FAILURE, UNSPECIFIED HEART FAILURE TYPE (HCC): Primary | ICD-10-CM

## 2024-11-19 DIAGNOSIS — R06.02 SHORTNESS OF BREATH: ICD-10-CM

## 2024-11-19 LAB
ALBUMIN SERPL-MCNC: 4 G/DL (ref 3.5–5.2)
ALP SERPL-CCNC: 57 U/L (ref 40–129)
ALT SERPL-CCNC: 12 U/L (ref 0–40)
ANION GAP SERPL CALCULATED.3IONS-SCNC: 11 MMOL/L (ref 7–16)
AST SERPL-CCNC: 15 U/L (ref 0–39)
BASOPHILS # BLD: 0.03 K/UL (ref 0–0.2)
BASOPHILS NFR BLD: 1 % (ref 0–2)
BILIRUB SERPL-MCNC: 1.5 MG/DL (ref 0–1.2)
BNP SERPL-MCNC: 6951 PG/ML (ref 0–125)
BUN SERPL-MCNC: 16 MG/DL (ref 6–20)
CALCIUM SERPL-MCNC: 8.8 MG/DL (ref 8.6–10.2)
CHLORIDE SERPL-SCNC: 101 MMOL/L (ref 98–107)
CO2 SERPL-SCNC: 29 MMOL/L (ref 22–29)
CREAT SERPL-MCNC: 1.6 MG/DL (ref 0.7–1.2)
EKG ATRIAL RATE: 74 BPM
EKG P AXIS: 15 DEGREES
EKG P-R INTERVAL: 208 MS
EKG Q-T INTERVAL: 492 MS
EKG QRS DURATION: 148 MS
EKG QTC CALCULATION (BAZETT): 546 MS
EKG R AXIS: -18 DEGREES
EKG T AXIS: 131 DEGREES
EKG VENTRICULAR RATE: 74 BPM
EOSINOPHIL # BLD: 0.11 K/UL (ref 0.05–0.5)
EOSINOPHILS RELATIVE PERCENT: 2 % (ref 0–6)
ERYTHROCYTE [DISTWIDTH] IN BLOOD BY AUTOMATED COUNT: 13.5 % (ref 11.5–15)
GFR, ESTIMATED: 51 ML/MIN/1.73M2
GLUCOSE SERPL-MCNC: 81 MG/DL (ref 74–99)
HCT VFR BLD AUTO: 41 % (ref 37–54)
HGB BLD-MCNC: 13.7 G/DL (ref 12.5–16.5)
IMM GRANULOCYTES # BLD AUTO: 0.05 K/UL (ref 0–0.58)
IMM GRANULOCYTES NFR BLD: 1 % (ref 0–5)
LYMPHOCYTES NFR BLD: 1.02 K/UL (ref 1.5–4)
LYMPHOCYTES RELATIVE PERCENT: 22 % (ref 20–42)
MCH RBC QN AUTO: 29.3 PG (ref 26–35)
MCHC RBC AUTO-ENTMCNC: 33.4 G/DL (ref 32–34.5)
MCV RBC AUTO: 87.6 FL (ref 80–99.9)
MONOCYTES NFR BLD: 0.32 K/UL (ref 0.1–0.95)
MONOCYTES NFR BLD: 7 % (ref 2–12)
NEUTROPHILS NFR BLD: 68 % (ref 43–80)
NEUTS SEG NFR BLD: 3.22 K/UL (ref 1.8–7.3)
PLATELET # BLD AUTO: 222 K/UL (ref 130–450)
PMV BLD AUTO: 10 FL (ref 7–12)
POTASSIUM SERPL-SCNC: 3 MMOL/L (ref 3.5–5)
PROT SERPL-MCNC: 6.9 G/DL (ref 6.4–8.3)
RBC # BLD AUTO: 4.68 M/UL (ref 3.8–5.8)
SODIUM SERPL-SCNC: 141 MMOL/L (ref 132–146)
TROPONIN I SERPL HS-MCNC: 22 NG/L (ref 0–11)
TROPONIN I SERPL HS-MCNC: 24 NG/L (ref 0–11)
WBC OTHER # BLD: 4.8 K/UL (ref 4.5–11.5)

## 2024-11-19 PROCEDURE — 80053 COMPREHEN METABOLIC PANEL: CPT

## 2024-11-19 PROCEDURE — 71045 X-RAY EXAM CHEST 1 VIEW: CPT

## 2024-11-19 PROCEDURE — 6360000002 HC RX W HCPCS

## 2024-11-19 PROCEDURE — 93010 ELECTROCARDIOGRAM REPORT: CPT | Performed by: INTERNAL MEDICINE

## 2024-11-19 PROCEDURE — 6370000000 HC RX 637 (ALT 250 FOR IP)

## 2024-11-19 PROCEDURE — 96374 THER/PROPH/DIAG INJ IV PUSH: CPT

## 2024-11-19 PROCEDURE — 83880 ASSAY OF NATRIURETIC PEPTIDE: CPT

## 2024-11-19 PROCEDURE — 85025 COMPLETE CBC W/AUTO DIFF WBC: CPT

## 2024-11-19 PROCEDURE — 84484 ASSAY OF TROPONIN QUANT: CPT

## 2024-11-19 PROCEDURE — 93005 ELECTROCARDIOGRAM TRACING: CPT

## 2024-11-19 PROCEDURE — 99285 EMERGENCY DEPT VISIT HI MDM: CPT

## 2024-11-19 RX ORDER — FUROSEMIDE 10 MG/ML
20 INJECTION INTRAMUSCULAR; INTRAVENOUS ONCE
Status: COMPLETED | OUTPATIENT
Start: 2024-11-19 | End: 2024-11-19

## 2024-11-19 RX ADMIN — POTASSIUM BICARBONATE 40 MEQ: 782 TABLET, EFFERVESCENT ORAL at 08:31

## 2024-11-19 RX ADMIN — FUROSEMIDE 20 MG: 10 INJECTION, SOLUTION INTRAMUSCULAR; INTRAVENOUS at 09:11

## 2024-11-19 ASSESSMENT — PAIN - FUNCTIONAL ASSESSMENT: PAIN_FUNCTIONAL_ASSESSMENT: NONE - DENIES PAIN

## 2024-11-19 NOTE — ED PROVIDER NOTES
[facility] EMERGENCY DEPARTMENT  EMERGENCY DEPARTMENT ENCOUNTER        Pt Name: Maryjo Gomez Sr.  MRN: [id number]  Birthdate 1965  Date of evaluation: 11/19/2024  Provider: Jose Cantu DO  PCP: Giuseppe Cote MD  Note Started: 9:04 AM EST 11/19/24    CHIEF COMPLAINT       Chief Complaint   Patient presents with    Shortness of Breath     Short of breath while laying flat, unable to sleep. SOB on movement. History of CHF.       HISTORY OF PRESENT ILLNESS: 1 or more Elements   History From: patient    Limitations to history : None    Maryjo Gomez Sr. is a 59 y.o. male who presents for slight increased shortness of breath with a history of CHF.  Patient reports he has ejection fraction approximately 25% has been compliant with his medications including Lasix.  He believes he needs an extra dose of Lasix.  No lower extremity swelling but he says that he feels a little more short of breath when he is ambulating.  No oxygen requirement. Patient denies fever, chills, headache, chest pain, abdominal pain, nausea, vomiting, diarrhea, lightheadedness, dysuria, hematuria, hematochezia, and melena.    Nursing Notes were all reviewed and agreed with or any disagreements were addressed in the HPI.        REVIEW OF SYSTEMS :           Positives and Pertinent negatives as per HPI.     SURGICAL HISTORY     Past Surgical History:   Procedure Laterality Date    CARDIAC DEFIBRILLATOR PLACEMENT Left 10/10/2017    Medtronic Dual ICD,     COLONOSCOPY N/A 1/20/2021    COLONOSCOPY WITH BIOPSY performed by Marlon Waters MD at [facility] ENDOSCOPY    PACEMAKER PLACEMENT      TONSILLECTOMY         CURRENTMEDICATIONS       Discharge Medication List as of 11/19/2024  9:08 AM        CONTINUE these medications which have NOT CHANGED    Details   sacubitril-valsartan (ENTRESTO) 49-51 MG per tablet Take 1 tablet by mouth 2 times daily, Disp-180 tablet, R-3Print      rivaroxaban

## 2025-01-16 ENCOUNTER — OFFICE VISIT (OUTPATIENT)
Dept: CARDIOLOGY CLINIC | Age: 60
End: 2025-01-16
Payer: MEDICARE

## 2025-01-16 VITALS
RESPIRATION RATE: 20 BRPM | HEIGHT: 72 IN | WEIGHT: 208 LBS | BODY MASS INDEX: 28.17 KG/M2 | DIASTOLIC BLOOD PRESSURE: 72 MMHG | HEART RATE: 71 BPM | SYSTOLIC BLOOD PRESSURE: 124 MMHG

## 2025-01-16 DIAGNOSIS — I50.9 CONGESTIVE HEART FAILURE, UNSPECIFIED HF CHRONICITY, UNSPECIFIED HEART FAILURE TYPE (HCC): Primary | ICD-10-CM

## 2025-01-16 PROCEDURE — G8417 CALC BMI ABV UP PARAM F/U: HCPCS | Performed by: INTERNAL MEDICINE

## 2025-01-16 PROCEDURE — 99214 OFFICE O/P EST MOD 30 MIN: CPT | Performed by: INTERNAL MEDICINE

## 2025-01-16 PROCEDURE — 3017F COLORECTAL CA SCREEN DOC REV: CPT | Performed by: INTERNAL MEDICINE

## 2025-01-16 PROCEDURE — G8427 DOCREV CUR MEDS BY ELIG CLIN: HCPCS | Performed by: INTERNAL MEDICINE

## 2025-01-16 PROCEDURE — 1036F TOBACCO NON-USER: CPT | Performed by: INTERNAL MEDICINE

## 2025-01-16 PROCEDURE — 93000 ELECTROCARDIOGRAM COMPLETE: CPT | Performed by: INTERNAL MEDICINE

## 2025-01-16 PROCEDURE — 3078F DIAST BP <80 MM HG: CPT | Performed by: INTERNAL MEDICINE

## 2025-01-16 PROCEDURE — 3074F SYST BP LT 130 MM HG: CPT | Performed by: INTERNAL MEDICINE

## 2025-01-16 RX ORDER — ASPIRIN 81 MG/1
81 TABLET ORAL DAILY
COMMUNITY

## 2025-01-16 RX ORDER — ROSUVASTATIN CALCIUM 10 MG/1
10 TABLET, COATED ORAL DAILY
COMMUNITY

## 2025-01-16 NOTE — PROGRESS NOTES
above:      IMPRESSION:  1. Dilated nonischemic cardiomyopathy: EF 20-25%, stable we will continue current treatment.  2. Congestive heart failure: Systolic, chronic compensated, will continue current treatment.  3. Paroxysmal atrial fibrillation: Now in sinus rhythm, on chronic anticoagulation with Xarelto  4.  Occasional PVCs:  5. AICD in situ  6. Mitral valve regurgitation: Moderate  7. Mild TR  8. Pulmonary hypertension: Mild  9. HTN: Controlled  10. Type 2 diabetes mellitus  11. Stage III chronic kidney disease  12. Chronic anticoagulation    RECOMMENDATIONS:   1.  Continue current treatment  2.  Increased risk of bleeding due to being on anti-coagulation, symptoms and signs of bleeding discussed with patient, patient was advised to seek medical attention at the earliest symptoms or signs of bleeding.  3.  CHF: Daily weight, take an extra Lasix for weight gain of more than 2-3 pounds in 24 hours, compliance with diuretics, low-salt diet were all advised.  4.  Preventive cardiology: Low-salt, low-cholesterol diet, daily exercise, were all advised.  5.  Follow-up with Dr. Cote as scheduled  6.  Follow-up with Dr. Ramirez as scheduled  7.  Follow-up with Dr. Sethi in 6 months, sooner if symptomatic for any reason    I have reviewed my findings and recommendations with patient   Electronically signed by Lula Sethi MD on 1/16/2025 at 6:06 PM  NOTE: This report was transcribed using voice recognition software. Every effort was made to ensure accuracy; however, inadvertent computerized transcription errors may be present

## 2025-02-18 ENCOUNTER — HOSPITAL ENCOUNTER (INPATIENT)
Age: 60
LOS: 6 days | Discharge: HOME | DRG: 291 | End: 2025-02-24
Attending: EMERGENCY MEDICINE | Admitting: INTERNAL MEDICINE
Payer: MEDICARE

## 2025-02-18 ENCOUNTER — APPOINTMENT (OUTPATIENT)
Dept: GENERAL RADIOLOGY | Age: 60
DRG: 291 | End: 2025-02-18
Payer: MEDICARE

## 2025-02-18 DIAGNOSIS — J11.1 INFLUENZA WITH RESPIRATORY MANIFESTATION OTHER THAN PNEUMONIA: ICD-10-CM

## 2025-02-18 DIAGNOSIS — R94.31 ABNORMAL ECG: ICD-10-CM

## 2025-02-18 DIAGNOSIS — I44.7 LBBB (LEFT BUNDLE BRANCH BLOCK): ICD-10-CM

## 2025-02-18 DIAGNOSIS — I50.9 ACUTE DECOMPENSATED HEART FAILURE (HCC): Primary | ICD-10-CM

## 2025-02-18 DIAGNOSIS — I50.23 ACUTE ON CHRONIC SYSTOLIC CONGESTIVE HEART FAILURE (HCC): ICD-10-CM

## 2025-02-18 DIAGNOSIS — R94.31 ABNORMAL EKG: ICD-10-CM

## 2025-02-18 LAB
ANION GAP SERPL CALCULATED.3IONS-SCNC: 19 MMOL/L (ref 7–16)
BASOPHILS # BLD: 0.04 K/UL (ref 0–0.2)
BASOPHILS NFR BLD: 0 % (ref 0–2)
BNP SERPL-MCNC: ABNORMAL PG/ML (ref 0–125)
BUN SERPL-MCNC: 23 MG/DL (ref 6–20)
CALCIUM SERPL-MCNC: 9.3 MG/DL (ref 8.6–10.2)
CHLORIDE SERPL-SCNC: 98 MMOL/L (ref 98–107)
CO2 SERPL-SCNC: 20 MMOL/L (ref 22–29)
CREAT SERPL-MCNC: 2.1 MG/DL (ref 0.7–1.2)
EOSINOPHIL # BLD: 0.02 K/UL (ref 0.05–0.5)
EOSINOPHILS RELATIVE PERCENT: 0 % (ref 0–6)
ERYTHROCYTE [DISTWIDTH] IN BLOOD BY AUTOMATED COUNT: 13.4 % (ref 11.5–15)
GFR, ESTIMATED: 36 ML/MIN/1.73M2
GLUCOSE BLD-MCNC: 206 MG/DL (ref 74–99)
GLUCOSE SERPL-MCNC: 170 MG/DL (ref 74–99)
HCT VFR BLD AUTO: 44 % (ref 37–54)
HGB BLD-MCNC: 14 G/DL (ref 12.5–16.5)
IMM GRANULOCYTES # BLD AUTO: 0.07 K/UL (ref 0–0.58)
IMM GRANULOCYTES NFR BLD: 1 % (ref 0–5)
INFLUENZA A BY PCR: DETECTED
INFLUENZA B BY PCR: NOT DETECTED
LYMPHOCYTES NFR BLD: 0.3 K/UL (ref 1.5–4)
LYMPHOCYTES RELATIVE PERCENT: 3 % (ref 20–42)
MCH RBC QN AUTO: 29.1 PG (ref 26–35)
MCHC RBC AUTO-ENTMCNC: 31.8 G/DL (ref 32–34.5)
MCV RBC AUTO: 91.5 FL (ref 80–99.9)
MONOCYTES NFR BLD: 0.4 K/UL (ref 0.1–0.95)
MONOCYTES NFR BLD: 4 % (ref 2–12)
NEUTROPHILS NFR BLD: 93 % (ref 43–80)
NEUTS SEG NFR BLD: 10.49 K/UL (ref 1.8–7.3)
PLATELET # BLD AUTO: 180 K/UL (ref 130–450)
PMV BLD AUTO: 10.6 FL (ref 7–12)
POTASSIUM SERPL-SCNC: 3.9 MMOL/L (ref 3.5–5)
RBC # BLD AUTO: 4.81 M/UL (ref 3.8–5.8)
RBC # BLD: ABNORMAL 10*6/UL
RSV BY PCR: NOT DETECTED
SARS-COV-2 RDRP RESP QL NAA+PROBE: NOT DETECTED
SODIUM SERPL-SCNC: 137 MMOL/L (ref 132–146)
SPECIMEN DESCRIPTION: NORMAL
SPECIMEN SOURCE: NORMAL
TROPONIN I SERPL HS-MCNC: 27 NG/L (ref 0–11)
TROPONIN I SERPL HS-MCNC: 30 NG/L (ref 0–11)
WBC OTHER # BLD: 11.3 K/UL (ref 4.5–11.5)

## 2025-02-18 PROCEDURE — 6360000002 HC RX W HCPCS: Performed by: EMERGENCY MEDICINE

## 2025-02-18 PROCEDURE — 82962 GLUCOSE BLOOD TEST: CPT

## 2025-02-18 PROCEDURE — 6370000000 HC RX 637 (ALT 250 FOR IP): Performed by: NURSE PRACTITIONER

## 2025-02-18 PROCEDURE — 71046 X-RAY EXAM CHEST 2 VIEWS: CPT

## 2025-02-18 PROCEDURE — 83880 ASSAY OF NATRIURETIC PEPTIDE: CPT

## 2025-02-18 PROCEDURE — 6360000002 HC RX W HCPCS: Performed by: NURSE PRACTITIONER

## 2025-02-18 PROCEDURE — 87502 INFLUENZA DNA AMP PROBE: CPT

## 2025-02-18 PROCEDURE — 93005 ELECTROCARDIOGRAM TRACING: CPT | Performed by: PHYSICIAN ASSISTANT

## 2025-02-18 PROCEDURE — 87635 SARS-COV-2 COVID-19 AMP PRB: CPT

## 2025-02-18 PROCEDURE — 87634 RSV DNA/RNA AMP PROBE: CPT

## 2025-02-18 PROCEDURE — 84484 ASSAY OF TROPONIN QUANT: CPT

## 2025-02-18 PROCEDURE — 6370000000 HC RX 637 (ALT 250 FOR IP): Performed by: HOSPITALIST

## 2025-02-18 PROCEDURE — 99285 EMERGENCY DEPT VISIT HI MDM: CPT

## 2025-02-18 PROCEDURE — 2060000000 HC ICU INTERMEDIATE R&B

## 2025-02-18 PROCEDURE — 6360000002 HC RX W HCPCS: Performed by: HOSPITALIST

## 2025-02-18 PROCEDURE — 85025 COMPLETE CBC W/AUTO DIFF WBC: CPT

## 2025-02-18 PROCEDURE — 94664 DEMO&/EVAL PT USE INHALER: CPT

## 2025-02-18 PROCEDURE — 2500000003 HC RX 250 WO HCPCS: Performed by: HOSPITALIST

## 2025-02-18 PROCEDURE — 96374 THER/PROPH/DIAG INJ IV PUSH: CPT

## 2025-02-18 PROCEDURE — 80048 BASIC METABOLIC PNL TOTAL CA: CPT

## 2025-02-18 RX ORDER — SACUBITRIL AND VALSARTAN 49; 51 MG/1; MG/1
1 TABLET, FILM COATED ORAL 2 TIMES DAILY
Status: DISCONTINUED | OUTPATIENT
Start: 2025-02-18 | End: 2025-02-24 | Stop reason: HOSPADM

## 2025-02-18 RX ORDER — DEXTROSE MONOHYDRATE 100 MG/ML
INJECTION, SOLUTION INTRAVENOUS CONTINUOUS PRN
Status: DISCONTINUED | OUTPATIENT
Start: 2025-02-18 | End: 2025-02-24 | Stop reason: HOSPADM

## 2025-02-18 RX ORDER — ENOXAPARIN SODIUM 100 MG/ML
40 INJECTION SUBCUTANEOUS DAILY
Status: DISCONTINUED | OUTPATIENT
Start: 2025-02-18 | End: 2025-02-18 | Stop reason: ALTCHOICE

## 2025-02-18 RX ORDER — ONDANSETRON 2 MG/ML
4 INJECTION INTRAMUSCULAR; INTRAVENOUS EVERY 6 HOURS PRN
Status: DISCONTINUED | OUTPATIENT
Start: 2025-02-18 | End: 2025-02-18 | Stop reason: CLARIF

## 2025-02-18 RX ORDER — MAGNESIUM SULFATE IN WATER 40 MG/ML
2000 INJECTION, SOLUTION INTRAVENOUS ONCE
Status: COMPLETED | OUTPATIENT
Start: 2025-02-18 | End: 2025-02-18

## 2025-02-18 RX ORDER — SODIUM CHLORIDE 9 MG/ML
INJECTION, SOLUTION INTRAVENOUS PRN
Status: DISCONTINUED | OUTPATIENT
Start: 2025-02-18 | End: 2025-02-24 | Stop reason: HOSPADM

## 2025-02-18 RX ORDER — HYDRALAZINE HYDROCHLORIDE 50 MG/1
100 TABLET, FILM COATED ORAL 3 TIMES DAILY
Status: DISCONTINUED | OUTPATIENT
Start: 2025-02-18 | End: 2025-02-24 | Stop reason: HOSPADM

## 2025-02-18 RX ORDER — MAGNESIUM SULFATE IN WATER 40 MG/ML
2000 INJECTION, SOLUTION INTRAVENOUS PRN
Status: DISCONTINUED | OUTPATIENT
Start: 2025-02-18 | End: 2025-02-24 | Stop reason: HOSPADM

## 2025-02-18 RX ORDER — ROSUVASTATIN CALCIUM 10 MG/1
10 TABLET, COATED ORAL DAILY
Status: DISCONTINUED | OUTPATIENT
Start: 2025-02-18 | End: 2025-02-24 | Stop reason: HOSPADM

## 2025-02-18 RX ORDER — OSELTAMIVIR PHOSPHATE 30 MG/1
30 CAPSULE ORAL 2 TIMES DAILY
Status: COMPLETED | OUTPATIENT
Start: 2025-02-18 | End: 2025-02-23

## 2025-02-18 RX ORDER — ONDANSETRON 2 MG/ML
4 INJECTION INTRAMUSCULAR; INTRAVENOUS ONCE
Status: COMPLETED | OUTPATIENT
Start: 2025-02-18 | End: 2025-02-18

## 2025-02-18 RX ORDER — SODIUM CHLORIDE 0.9 % (FLUSH) 0.9 %
5-40 SYRINGE (ML) INJECTION PRN
Status: DISCONTINUED | OUTPATIENT
Start: 2025-02-18 | End: 2025-02-24 | Stop reason: HOSPADM

## 2025-02-18 RX ORDER — POTASSIUM CHLORIDE 1500 MG/1
40 TABLET, EXTENDED RELEASE ORAL PRN
Status: DISCONTINUED | OUTPATIENT
Start: 2025-02-18 | End: 2025-02-24 | Stop reason: HOSPADM

## 2025-02-18 RX ORDER — FUROSEMIDE 10 MG/ML
40 INJECTION INTRAMUSCULAR; INTRAVENOUS 2 TIMES DAILY
Status: DISCONTINUED | OUTPATIENT
Start: 2025-02-18 | End: 2025-02-24

## 2025-02-18 RX ORDER — ACETAMINOPHEN 650 MG/1
650 SUPPOSITORY RECTAL EVERY 6 HOURS PRN
Status: DISCONTINUED | OUTPATIENT
Start: 2025-02-18 | End: 2025-02-24 | Stop reason: HOSPADM

## 2025-02-18 RX ORDER — SODIUM CHLORIDE 0.9 % (FLUSH) 0.9 %
5-40 SYRINGE (ML) INJECTION EVERY 12 HOURS SCHEDULED
Status: DISCONTINUED | OUTPATIENT
Start: 2025-02-18 | End: 2025-02-24 | Stop reason: HOSPADM

## 2025-02-18 RX ORDER — ACETAMINOPHEN 325 MG/1
650 TABLET ORAL EVERY 6 HOURS PRN
Status: DISCONTINUED | OUTPATIENT
Start: 2025-02-18 | End: 2025-02-24 | Stop reason: HOSPADM

## 2025-02-18 RX ORDER — INSULIN LISPRO 100 [IU]/ML
0-8 INJECTION, SOLUTION INTRAVENOUS; SUBCUTANEOUS
Status: DISCONTINUED | OUTPATIENT
Start: 2025-02-18 | End: 2025-02-24 | Stop reason: HOSPADM

## 2025-02-18 RX ORDER — IPRATROPIUM BROMIDE AND ALBUTEROL SULFATE 2.5; .5 MG/3ML; MG/3ML
1 SOLUTION RESPIRATORY (INHALATION) ONCE
Status: COMPLETED | OUTPATIENT
Start: 2025-02-18 | End: 2025-02-18

## 2025-02-18 RX ORDER — ONDANSETRON 4 MG/1
4 TABLET, ORALLY DISINTEGRATING ORAL EVERY 8 HOURS PRN
Status: DISCONTINUED | OUTPATIENT
Start: 2025-02-18 | End: 2025-02-18 | Stop reason: CLARIF

## 2025-02-18 RX ORDER — PROCHLORPERAZINE EDISYLATE 5 MG/ML
10 INJECTION INTRAMUSCULAR; INTRAVENOUS EVERY 6 HOURS PRN
Status: DISCONTINUED | OUTPATIENT
Start: 2025-02-18 | End: 2025-02-24 | Stop reason: HOSPADM

## 2025-02-18 RX ORDER — POTASSIUM CHLORIDE 7.45 MG/ML
10 INJECTION INTRAVENOUS PRN
Status: DISCONTINUED | OUTPATIENT
Start: 2025-02-18 | End: 2025-02-24 | Stop reason: HOSPADM

## 2025-02-18 RX ORDER — GLUCAGON 1 MG/ML
1 KIT INJECTION PRN
Status: DISCONTINUED | OUTPATIENT
Start: 2025-02-18 | End: 2025-02-24 | Stop reason: HOSPADM

## 2025-02-18 RX ORDER — FUROSEMIDE 10 MG/ML
60 INJECTION INTRAMUSCULAR; INTRAVENOUS ONCE
Status: COMPLETED | OUTPATIENT
Start: 2025-02-18 | End: 2025-02-18

## 2025-02-18 RX ORDER — CARVEDILOL 25 MG/1
25 TABLET ORAL 2 TIMES DAILY WITH MEALS
Status: DISCONTINUED | OUTPATIENT
Start: 2025-02-18 | End: 2025-02-21

## 2025-02-18 RX ORDER — POLYETHYLENE GLYCOL 3350 17 G/17G
17 POWDER, FOR SOLUTION ORAL DAILY PRN
Status: DISCONTINUED | OUTPATIENT
Start: 2025-02-18 | End: 2025-02-24 | Stop reason: HOSPADM

## 2025-02-18 RX ORDER — SPIRONOLACTONE 25 MG/1
25 TABLET ORAL DAILY
Status: DISCONTINUED | OUTPATIENT
Start: 2025-02-18 | End: 2025-02-24 | Stop reason: HOSPADM

## 2025-02-18 RX ORDER — PROCHLORPERAZINE MALEATE 10 MG
10 TABLET ORAL EVERY 8 HOURS PRN
Status: DISCONTINUED | OUTPATIENT
Start: 2025-02-18 | End: 2025-02-24 | Stop reason: HOSPADM

## 2025-02-18 RX ORDER — ASPIRIN 81 MG/1
81 TABLET ORAL DAILY
Status: DISCONTINUED | OUTPATIENT
Start: 2025-02-18 | End: 2025-02-24 | Stop reason: HOSPADM

## 2025-02-18 RX ADMIN — SACUBITRIL AND VALSARTAN 1 TABLET: 49; 51 TABLET, FILM COATED ORAL at 21:40

## 2025-02-18 RX ADMIN — FUROSEMIDE 60 MG: 10 INJECTION, SOLUTION INTRAMUSCULAR; INTRAVENOUS at 16:28

## 2025-02-18 RX ADMIN — ACETAMINOPHEN 650 MG: 325 TABLET ORAL at 21:52

## 2025-02-18 RX ADMIN — SPIRONOLACTONE 25 MG: 25 TABLET ORAL at 21:40

## 2025-02-18 RX ADMIN — MAGNESIUM SULFATE HEPTAHYDRATE 2000 MG: 40 INJECTION, SOLUTION INTRAVENOUS at 16:28

## 2025-02-18 RX ADMIN — FUROSEMIDE 40 MG: 10 INJECTION, SOLUTION INTRAMUSCULAR; INTRAVENOUS at 21:39

## 2025-02-18 RX ADMIN — SODIUM CHLORIDE, PRESERVATIVE FREE 10 ML: 5 INJECTION INTRAVENOUS at 21:40

## 2025-02-18 RX ADMIN — HYDRALAZINE HYDROCHLORIDE 100 MG: 50 TABLET ORAL at 21:40

## 2025-02-18 RX ADMIN — OSELTAMIVIR PHOSPHATE 30 MG: 30 CAPSULE ORAL at 21:40

## 2025-02-18 RX ADMIN — IPRATROPIUM BROMIDE AND ALBUTEROL SULFATE 1 DOSE: 2.5; .5 SOLUTION RESPIRATORY (INHALATION) at 14:37

## 2025-02-18 RX ADMIN — ONDANSETRON 4 MG: 2 INJECTION, SOLUTION INTRAMUSCULAR; INTRAVENOUS at 14:37

## 2025-02-18 RX ADMIN — CARVEDILOL 25 MG: 25 TABLET, FILM COATED ORAL at 21:40

## 2025-02-18 RX ADMIN — ROSUVASTATIN CALCIUM 10 MG: 10 TABLET, FILM COATED ORAL at 21:40

## 2025-02-18 ASSESSMENT — PAIN DESCRIPTION - LOCATION: LOCATION: BACK

## 2025-02-18 ASSESSMENT — PAIN SCALES - GENERAL
PAINLEVEL_OUTOF10: 2
PAINLEVEL_OUTOF10: 0

## 2025-02-18 ASSESSMENT — PAIN DESCRIPTION - DESCRIPTORS: DESCRIPTORS: ACHING

## 2025-02-18 ASSESSMENT — PAIN DESCRIPTION - ORIENTATION: ORIENTATION: LEFT;RIGHT;MID;LOWER

## 2025-02-18 NOTE — ED PROVIDER NOTES
Shared DONNA-ED Attending Visit.  CC: Yes      [facility]  Department of Emergency Medicine   ED  Encounter Note  Admit Date/RoomTime: 2025  1:47 PM  ED Room:     NAME: Maryjo Gomez Sr.  : 1965  MRN: [id number]     Chief Complaint:  Shortness of Breath (Hx CHF, concern for Covid )    History of Present Illness      Maryjo Gomez Sr. is a 59 y.o. old male with a history of hypertension, CHF, cardiomyopathy, mitral valve insufficiency, IACD who presents to the emergency department by private vehicle, with shortness of breath and URI concerns since last night.  He said last night he has been having shortness of breath and difficulty breathing.  This morning it got worse.  He said he is worried he has COVID.  His roommate has been sick with similar symptoms.  He does not wear oxygen at baseline but when he arrived here, he was placed on 2L nasal cannula due to a saturation of 88% per nursing staff.    He denies chest pain, fevers diarrhea.  He reports an episode of emesis just prior to arriving here to the ER.      ROS   Pertinent positives and negatives are stated within HPI, all other systems reviewed and are negative.    Past Medical History:  has a past medical history of Cerebral artery occlusion with cerebral infarction (HCC), CHF (congestive heart failure) (HCC), Colon cancer screening, Dilated cardiomyopathy (HCC), Hypertension, Nonrheumatic mitral (valve) insufficiency, and Nonrheumatic tricuspid (valve) insufficiency.    Surgical History:  has a past surgical history that includes Cardiac defibrillator placement (Left, 10/10/2017); pacemaker placement; Tonsillectomy; and Colonoscopy (N/A, 2021).    Social History:  reports that he has never smoked. He has been exposed to tobacco smoke. He has never used smokeless tobacco. He reports that he does not currently use alcohol. He reports current drug use. Frequency: 7.00 times per week. Drug: Marijuana (Weed).    Family

## 2025-02-18 NOTE — CARE COORDINATION
Internal Medicine On-call Care Coordination Note     I was called by the ED physician because they recommended admission for this patient and we cover their PCP.  The history as I understand it after discussion with the ED physician is as follows:     Admit patient for acute CHF exacerbation along with influenza A  Patient requiring oxygen now  Repeat troponin and BNP  No left bundle branch block seen     I placed admission orders.  Including:     Cardiology consult  Echocardiogram  Start Tamiflu  IV diuresis for now  Chest x-ray is not too impressive for difference compared to prior     Dr. Che or his coverage will see the patient tomorrow for H&P and review of all orders.     Electronically signed by Oscar Chaparro MD on 2/18/2025 at 4:28 PM

## 2025-02-18 NOTE — ED TRIAGE NOTES
Department of Emergency Medicine  PROVIDER IN TRIAGE NOTE                        2/18/25  1:02 PM EST    Date of Encounter: 2/18/25   MRN: [id number]      HPI: Maryjo Gomez George is a 59 y.o. male who presents to the ED for Shortness of Breath (Hx CHF, concern for Covid )  Patient reports that his symptoms have been present for approximately 1 day.  He does not wear oxygen at home.  He denies any additional symptoms other than feeling more short of breath over the past 1 day.  He has concern for COVID.    Vitals:    02/18/25 1300   BP: (!) 148/97   Pulse: (!) 106   Resp: 14   Temp: 97.8 °F (36.6 °C)   TempSrc: Temporal   SpO2: 95%   Weight: 95.3 kg (210 lb)   Height: 1.829 m (6')          Provider-Patient relationship only established for Provider In Triage (PIT).  Per employer/facility request for DONNA to initiate contact and input an assessment note in triage during high volume surges.  Full assessment, HPI and examination not performed.  Secondary to high volume, low staffing, and/or boarding- patient to await bed availability.  This ends my PIT-Patient relationship.   Electronically signed by BRANDEN Gallegos   DD: 2/18/25

## 2025-02-18 NOTE — ED NOTES
Pt stating he can't breathe, check ed pulse ox, remains 93-95% on room air, placed on 2L NC for comfort.

## 2025-02-19 ENCOUNTER — APPOINTMENT (OUTPATIENT)
Age: 60
DRG: 291 | End: 2025-02-19
Attending: HOSPITALIST
Payer: MEDICARE

## 2025-02-19 PROBLEM — I34.0 NONRHEUMATIC MITRAL VALVE REGURGITATION: Status: ACTIVE | Noted: 2025-02-19

## 2025-02-19 PROBLEM — J96.01 ACUTE HYPOXIC RESPIRATORY FAILURE (HCC): Status: ACTIVE | Noted: 2025-02-19

## 2025-02-19 PROBLEM — J11.1 INFLUENZA WITH RESPIRATORY MANIFESTATION OTHER THAN PNEUMONIA: Status: ACTIVE | Noted: 2025-02-19

## 2025-02-19 PROBLEM — I48.0 PAF (PAROXYSMAL ATRIAL FIBRILLATION) (HCC): Status: ACTIVE | Noted: 2025-02-19

## 2025-02-19 PROBLEM — J10.1 INFLUENZA A: Status: ACTIVE | Noted: 2025-02-19

## 2025-02-19 PROBLEM — I44.7 LBBB (LEFT BUNDLE BRANCH BLOCK): Status: ACTIVE | Noted: 2025-02-19

## 2025-02-19 LAB
ALBUMIN SERPL-MCNC: 4.2 G/DL (ref 3.5–5.2)
ALP SERPL-CCNC: 53 U/L (ref 40–129)
ALT SERPL-CCNC: 23 U/L (ref 0–40)
ANION GAP SERPL CALCULATED.3IONS-SCNC: 14 MMOL/L (ref 7–16)
AST SERPL-CCNC: 34 U/L (ref 0–39)
BASOPHILS # BLD: 0.01 K/UL (ref 0–0.2)
BASOPHILS NFR BLD: 0 % (ref 0–2)
BILIRUB DIRECT SERPL-MCNC: 0.5 MG/DL (ref 0–0.3)
BILIRUB INDIRECT SERPL-MCNC: 1.7 MG/DL (ref 0–1)
BILIRUB SERPL-MCNC: 2.2 MG/DL (ref 0–1.2)
BUN SERPL-MCNC: 33 MG/DL (ref 6–20)
CALCIUM SERPL-MCNC: 8.7 MG/DL (ref 8.6–10.2)
CHLORIDE SERPL-SCNC: 99 MMOL/L (ref 98–107)
CHOLEST SERPL-MCNC: 142 MG/DL
CO2 SERPL-SCNC: 25 MMOL/L (ref 22–29)
CREAT SERPL-MCNC: 2.7 MG/DL (ref 0.7–1.2)
ECHO AO ASC DIAM: 3.5 CM
ECHO AO ASCENDING AORTA INDEX: 1.63 CM/M2
ECHO AV AREA PEAK VELOCITY: 1.9 CM2
ECHO AV AREA VTI: 2.1 CM2
ECHO AV AREA/BSA PEAK VELOCITY: 0.9 CM2/M2
ECHO AV AREA/BSA VTI: 1 CM2/M2
ECHO AV CUSP MM: 1.9 CM
ECHO AV MEAN GRADIENT: 4 MMHG
ECHO AV MEAN VELOCITY: 0.9 M/S
ECHO AV PEAK GRADIENT: 8 MMHG
ECHO AV PEAK VELOCITY: 1.4 M/S
ECHO AV VELOCITY RATIO: 0.57
ECHO AV VTI: 20.4 CM
ECHO BSA: 2.2 M2
ECHO EST RA PRESSURE: 15 MMHG
ECHO LA DIAMETER INDEX: 2.42 CM/M2
ECHO LA DIAMETER: 5.2 CM
ECHO LA VOL A-L A2C: 138 ML (ref 18–58)
ECHO LA VOL A-L A4C: 139 ML (ref 18–58)
ECHO LA VOL MOD A2C: 135 ML (ref 18–58)
ECHO LA VOL MOD A4C: 132 ML (ref 18–58)
ECHO LA VOLUME AREA LENGTH: 141 ML
ECHO LA VOLUME INDEX A-L A2C: 64 ML/M2 (ref 16–34)
ECHO LA VOLUME INDEX A-L A4C: 65 ML/M2 (ref 16–34)
ECHO LA VOLUME INDEX AREA LENGTH: 66 ML/M2 (ref 16–34)
ECHO LA VOLUME INDEX MOD A2C: 63 ML/M2 (ref 16–34)
ECHO LA VOLUME INDEX MOD A4C: 61 ML/M2 (ref 16–34)
ECHO LV E' LATERAL VELOCITY: 4 CM/S
ECHO LV E' SEPTAL VELOCITY: 3 CM/S
ECHO LV EDV A2C: 431 ML
ECHO LV EDV A4C: 381 ML
ECHO LV EDV BP: 413 ML (ref 67–155)
ECHO LV EDV INDEX A4C: 177 ML/M2
ECHO LV EDV INDEX BP: 192 ML/M2
ECHO LV EDV NDEX A2C: 200 ML/M2
ECHO LV EF PHYSICIAN: 20 %
ECHO LV EJECTION FRACTION A2C: 23 %
ECHO LV EJECTION FRACTION A4C: 23 %
ECHO LV EJECTION FRACTION BIPLANE: 22 % (ref 55–100)
ECHO LV ESV A2C: 332 ML
ECHO LV ESV A4C: 293 ML
ECHO LV ESV BP: 324 ML (ref 22–58)
ECHO LV ESV INDEX A2C: 154 ML/M2
ECHO LV ESV INDEX A4C: 136 ML/M2
ECHO LV ESV INDEX BP: 151 ML/M2
ECHO LV FRACTIONAL SHORTENING: 9 % (ref 28–44)
ECHO LV INTERNAL DIMENSION DIASTOLE INDEX: 3.77 CM/M2
ECHO LV INTERNAL DIMENSION DIASTOLIC: 8.1 CM (ref 4.2–5.9)
ECHO LV INTERNAL DIMENSION SYSTOLIC INDEX: 3.44 CM/M2
ECHO LV INTERNAL DIMENSION SYSTOLIC: 7.4 CM
ECHO LV IVSD: 1.1 CM (ref 0.6–1)
ECHO LV IVSS: 1.2 CM
ECHO LV MASS 2D: 467.6 G (ref 88–224)
ECHO LV MASS INDEX 2D: 217.5 G/M2 (ref 49–115)
ECHO LV POSTERIOR WALL DIASTOLIC: 1.1 CM (ref 0.6–1)
ECHO LV POSTERIOR WALL SYSTOLIC: 1.3 CM
ECHO LV RELATIVE WALL THICKNESS RATIO: 0.27
ECHO LVOT AREA: 3.1 CM2
ECHO LVOT AV VTI INDEX: 0.63
ECHO LVOT DIAM: 2 CM
ECHO LVOT MEAN GRADIENT: 1 MMHG
ECHO LVOT PEAK GRADIENT: 3 MMHG
ECHO LVOT PEAK VELOCITY: 0.8 M/S
ECHO LVOT STROKE VOLUME INDEX: 18.8 ML/M2
ECHO LVOT SV: 40.5 ML
ECHO LVOT VTI: 12.9 CM
ECHO MV "A" WAVE DURATION: 128 MSEC
ECHO MV A VELOCITY: 0.53 M/S
ECHO MV AREA PHT: 3.9 CM2
ECHO MV AREA VTI: 1 CM2
ECHO MV E DECELERATION TIME (DT): 146.1 MS
ECHO MV E VELOCITY: 1.54 M/S
ECHO MV E/A RATIO: 2.91
ECHO MV E/E' LATERAL: 38.5
ECHO MV E/E' RATIO (AVERAGED): 44.92
ECHO MV E/E' SEPTAL: 51.33
ECHO MV EROA PISA: 0.3 CM2
ECHO MV LVOT VTI INDEX: 3.02
ECHO MV MAX VELOCITY: 1.6 M/S
ECHO MV MEAN GRADIENT: 4 MMHG
ECHO MV MEAN VELOCITY: 0.9 M/S
ECHO MV PEAK GRADIENT: 10 MMHG
ECHO MV PRESSURE HALF TIME (PHT): 56.8 MS
ECHO MV REGURGITANT ALIASING (NYQUIST) VELOCITY: 31 CM/S
ECHO MV REGURGITANT RADIUS PISA: 0.86 CM
ECHO MV REGURGITANT VELOCITY PISA: 5.4 M/S
ECHO MV REGURGITANT VOLUME PISA: 43.44 ML
ECHO MV REGURGITANT VTIA: 162.9 CM
ECHO MV VTI: 38.9 CM
ECHO PULMONARY ARTERY END DIASTOLIC PRESSURE: 10 MMHG
ECHO PV MAX VELOCITY: 0.8 M/S
ECHO PV MEAN GRADIENT: 2 MMHG
ECHO PV MEAN VELOCITY: 0.6 M/S
ECHO PV PEAK GRADIENT: 3 MMHG
ECHO PV REGURGITANT MAX VELOCITY: 1.6 M/S
ECHO PV VTI: 11.2 CM
ECHO RIGHT VENTRICULAR SYSTOLIC PRESSURE (RVSP): 65 MMHG
ECHO RV FREE WALL PEAK S': 10 CM/S
ECHO RV INTERNAL DIMENSION: 3.1 CM
ECHO RV TAPSE: 1.8 CM (ref 1.7–?)
ECHO TV REGURGITANT MAX VELOCITY: 3.54 M/S
ECHO TV REGURGITANT PEAK GRADIENT: 50 MMHG
EOSINOPHIL # BLD: 0 K/UL (ref 0.05–0.5)
EOSINOPHILS RELATIVE PERCENT: 0 % (ref 0–6)
ERYTHROCYTE [DISTWIDTH] IN BLOOD BY AUTOMATED COUNT: 13.8 % (ref 11.5–15)
GFR, ESTIMATED: 27 ML/MIN/1.73M2
GLUCOSE BLD-MCNC: 106 MG/DL (ref 74–99)
GLUCOSE BLD-MCNC: 127 MG/DL (ref 74–99)
GLUCOSE BLD-MCNC: 256 MG/DL (ref 74–99)
GLUCOSE BLD-MCNC: 96 MG/DL (ref 74–99)
GLUCOSE SERPL-MCNC: 87 MG/DL (ref 74–99)
HBA1C MFR BLD: 5.5 % (ref 4–5.6)
HCT VFR BLD AUTO: 39.2 % (ref 37–54)
HDLC SERPL-MCNC: 74 MG/DL
HGB BLD-MCNC: 12.6 G/DL (ref 12.5–16.5)
IMM GRANULOCYTES # BLD AUTO: 0.03 K/UL (ref 0–0.58)
IMM GRANULOCYTES NFR BLD: 1 % (ref 0–5)
LDLC SERPL CALC-MCNC: 57 MG/DL
LEFT VENTRICULAR EJECTION FRACTION HIGH VALUE: 25 %
LEFT VENTRICULAR EJECTION FRACTION MODE: NORMAL
LV EF: 20 %
LYMPHOCYTES NFR BLD: 0.31 K/UL (ref 1.5–4)
LYMPHOCYTES RELATIVE PERCENT: 6 % (ref 20–42)
MAGNESIUM SERPL-MCNC: 2.1 MG/DL (ref 1.6–2.6)
MCH RBC QN AUTO: 29.4 PG (ref 26–35)
MCHC RBC AUTO-ENTMCNC: 32.1 G/DL (ref 32–34.5)
MCV RBC AUTO: 91.4 FL (ref 80–99.9)
MONOCYTES NFR BLD: 0.32 K/UL (ref 0.1–0.95)
MONOCYTES NFR BLD: 6 % (ref 2–12)
NEUTROPHILS NFR BLD: 88 % (ref 43–80)
NEUTS SEG NFR BLD: 4.9 K/UL (ref 1.8–7.3)
PLATELET, FLUORESCENCE: 136 K/UL (ref 130–450)
PMV BLD AUTO: 10.8 FL (ref 7–12)
POTASSIUM SERPL-SCNC: 3.8 MMOL/L (ref 3.5–5)
PROT SERPL-MCNC: 7.2 G/DL (ref 6.4–8.3)
RBC # BLD AUTO: 4.29 M/UL (ref 3.8–5.8)
RBC # BLD: ABNORMAL 10*6/UL
SODIUM SERPL-SCNC: 138 MMOL/L (ref 132–146)
T4 FREE SERPL-MCNC: 1.6 NG/DL (ref 0.9–1.7)
TRIGL SERPL-MCNC: 56 MG/DL
TSH SERPL DL<=0.05 MIU/L-ACNC: 1.37 UIU/ML (ref 0.27–4.2)
VLDLC SERPL CALC-MCNC: 11 MG/DL
WBC OTHER # BLD: 5.6 K/UL (ref 4.5–11.5)

## 2025-02-19 PROCEDURE — 84300 ASSAY OF URINE SODIUM: CPT

## 2025-02-19 PROCEDURE — 80053 COMPREHEN METABOLIC PANEL: CPT

## 2025-02-19 PROCEDURE — 2700000000 HC OXYGEN THERAPY PER DAY

## 2025-02-19 PROCEDURE — 2060000000 HC ICU INTERMEDIATE R&B

## 2025-02-19 PROCEDURE — 85025 COMPLETE CBC W/AUTO DIFF WBC: CPT

## 2025-02-19 PROCEDURE — 82962 GLUCOSE BLOOD TEST: CPT

## 2025-02-19 PROCEDURE — APPSS60 APP SPLIT SHARED TIME 46-60 MINUTES: Performed by: CLINICAL NURSE SPECIALIST

## 2025-02-19 PROCEDURE — 82248 BILIRUBIN DIRECT: CPT

## 2025-02-19 PROCEDURE — 2500000003 HC RX 250 WO HCPCS: Performed by: HOSPITALIST

## 2025-02-19 PROCEDURE — 83735 ASSAY OF MAGNESIUM: CPT

## 2025-02-19 PROCEDURE — 84439 ASSAY OF FREE THYROXINE: CPT

## 2025-02-19 PROCEDURE — 6370000000 HC RX 637 (ALT 250 FOR IP): Performed by: INTERNAL MEDICINE

## 2025-02-19 PROCEDURE — 83036 HEMOGLOBIN GLYCOSYLATED A1C: CPT

## 2025-02-19 PROCEDURE — 80061 LIPID PANEL: CPT

## 2025-02-19 PROCEDURE — 6360000002 HC RX W HCPCS: Performed by: HOSPITALIST

## 2025-02-19 PROCEDURE — 93306 TTE W/DOPPLER COMPLETE: CPT | Performed by: INTERNAL MEDICINE

## 2025-02-19 PROCEDURE — 36415 COLL VENOUS BLD VENIPUNCTURE: CPT

## 2025-02-19 PROCEDURE — 6370000000 HC RX 637 (ALT 250 FOR IP): Performed by: HOSPITALIST

## 2025-02-19 PROCEDURE — 99223 1ST HOSP IP/OBS HIGH 75: CPT | Performed by: INTERNAL MEDICINE

## 2025-02-19 PROCEDURE — 84540 ASSAY OF URINE/UREA-N: CPT

## 2025-02-19 PROCEDURE — 2500000003 HC RX 250 WO HCPCS: Performed by: NURSE PRACTITIONER

## 2025-02-19 PROCEDURE — 93306 TTE W/DOPPLER COMPLETE: CPT

## 2025-02-19 PROCEDURE — 84443 ASSAY THYROID STIM HORMONE: CPT

## 2025-02-19 RX ORDER — GUAIFENESIN 200 MG/10ML
200 LIQUID ORAL EVERY 4 HOURS PRN
Status: DISCONTINUED | OUTPATIENT
Start: 2025-02-19 | End: 2025-02-24 | Stop reason: HOSPADM

## 2025-02-19 RX ORDER — FLUTICASONE PROPIONATE 50 MCG
1 SPRAY, SUSPENSION (ML) NASAL DAILY PRN
Status: DISCONTINUED | OUTPATIENT
Start: 2025-02-19 | End: 2025-02-24 | Stop reason: HOSPADM

## 2025-02-19 RX ORDER — GLIPIZIDE 5 MG/1
5 TABLET ORAL
Status: DISCONTINUED | OUTPATIENT
Start: 2025-02-19 | End: 2025-02-24

## 2025-02-19 RX ADMIN — GUAIFENESIN 200 MG: 200 SOLUTION ORAL at 20:34

## 2025-02-19 RX ADMIN — HYDRALAZINE HYDROCHLORIDE 100 MG: 50 TABLET ORAL at 09:54

## 2025-02-19 RX ADMIN — ASPIRIN 81 MG: 81 TABLET, COATED ORAL at 09:54

## 2025-02-19 RX ADMIN — OSELTAMIVIR PHOSPHATE 30 MG: 30 CAPSULE ORAL at 09:54

## 2025-02-19 RX ADMIN — GLIPIZIDE 5 MG: 5 TABLET ORAL at 06:52

## 2025-02-19 RX ADMIN — RIVAROXABAN 15 MG: 15 TABLET, FILM COATED ORAL at 08:22

## 2025-02-19 RX ADMIN — HYDRALAZINE HYDROCHLORIDE 100 MG: 50 TABLET ORAL at 20:34

## 2025-02-19 RX ADMIN — OSELTAMIVIR PHOSPHATE 30 MG: 30 CAPSULE ORAL at 20:34

## 2025-02-19 RX ADMIN — CARVEDILOL 25 MG: 25 TABLET, FILM COATED ORAL at 08:22

## 2025-02-19 RX ADMIN — HYDRALAZINE HYDROCHLORIDE 100 MG: 50 TABLET ORAL at 15:52

## 2025-02-19 RX ADMIN — ACETAMINOPHEN 650 MG: 325 TABLET ORAL at 20:37

## 2025-02-19 RX ADMIN — SODIUM CHLORIDE, PRESERVATIVE FREE 10 ML: 5 INJECTION INTRAVENOUS at 20:34

## 2025-02-19 RX ADMIN — SPIRONOLACTONE 25 MG: 25 TABLET ORAL at 09:54

## 2025-02-19 RX ADMIN — FUROSEMIDE 40 MG: 10 INJECTION, SOLUTION INTRAMUSCULAR; INTRAVENOUS at 09:54

## 2025-02-19 RX ADMIN — ROSUVASTATIN CALCIUM 10 MG: 10 TABLET, FILM COATED ORAL at 09:54

## 2025-02-19 RX ADMIN — CARVEDILOL 25 MG: 25 TABLET, FILM COATED ORAL at 16:52

## 2025-02-19 RX ADMIN — SACUBITRIL AND VALSARTAN 1 TABLET: 49; 51 TABLET, FILM COATED ORAL at 08:22

## 2025-02-19 RX ADMIN — INSULIN LISPRO 4 UNITS: 100 INJECTION, SOLUTION INTRAVENOUS; SUBCUTANEOUS at 20:45

## 2025-02-19 NOTE — PLAN OF CARE
Patient's chart updated to reflect:      .    - HF care plan, HF education points and HF discharge instructions.  -Orders: 2 gram sodium diet, daily weights, I/O.  -PCP or cardiology follow up appointments to be scheduled within 7 days of hospital discharge.  -CHF education session will be provided to the patient prior to hospital discharge.    Gladis Sorensen RN   Heart Failure Navigator

## 2025-02-19 NOTE — H&P
Internal Medicine History & Physical     Name: Maryjo Gomez .  : 1965  Chief Complaint: Shortness of Breath (Hx CHF, concern for Covid )  Primary Care Physician: Giuseppe Cote MD  Admission date: 2025  Date of service: 2025   [facility]     History of Present Illness  Maryjo is a 59 y.o. year old male with a past medical history of hypertension, hyperlipidemia, paroxysmal atrial fibrillation anticoagulated with Xarelto and chronic systolic congestive heart failure/ischemic cardiomyopathy.  He presented to the ER on  with complaints of shortness of breath that began earlier that morning.  He reports that he had been in his usual state of health aside from some generalized weakness/fatigue for about 2 to 3 days.  He reports that he woke up and immediately felt as though he was not able to breathe.  He denies any fever or chills.  He does report a dry cough, no sputum production.  His symptoms were constant and severe, nothing seem to make it better or worse.  He reports that his friend had been sick with upper respiratory symptoms so he was worried he had COVID so decided to come to the ER for evaluation.  Upon arrival to the ER he was noted to be hypoxic with an SpO2 of 88% on room air so was placed on 2L NC with improvement.  Lab work was obtained and noted CO2 20, BUN 23, creatinine 2.1, WBC 11.3, hemoglobin 14.0 and proBNP 28,161.  Chest x-ray noted cardiomegaly with pulmonary edema similar to prior imaging.  He was also found to be positive for influenza A.  He was given IV Lasix, bronchodilators and admitted for further management.    Currently he is seen sitting up on the edge of the bed, awake and alert, seems tired and somewhat uncomfortable.  He reports ongoing weakness and fatigue.  He does feel like his breathing is improved, still has a slight cough but no sputum production.  He denies any fever or chills.  He denies any nausea or vomiting, very hungry and

## 2025-02-19 NOTE — DISCHARGE INSTRUCTIONS
weight gain of 3 pounds or more in 1 day         OR 5 pounds or more in one week  More shortness of breath  More swelling of your stomach, legs, ankles or feet  Feeling more tired, No energy  Dry hacky cough  Dizziness  More chest pain / discomfort       (CALL 557 IF ANY OF THE FOLLOWING OCCURS  Chest pain (not relieved with nitroglycerine, if you have been prescribed this medication)  Severe shortness of breath  Faint / Pass out  Confusion / cannot think clearly  If symptoms get worse           SMOKING - TOBACCO USE:  * IF YOU SMOKE - STOP!  Kick the habit.  [facility] Program is offered at [facility] and [facility]. Call [phone] extension [phone] for more information.             ACTIVITY:   (Ask your doctor when you will be able to return to work and before starting any exercise program.  Do not drive unless unless your doctor has given you permission to do so).  Start light exercise.  Even if you can only do a small amount, exercise will help you get stronger, have more energy, help manage your weight and decrease  stress. Walking is an easy way to get exercise. Start out slowly and  increase the amount you walk as tolerated  If you become short of breath, dizzy or have chest pain; stop, sit down, and rest.  If you feel \"wiped out\" the day after you exercise, walk at a slower pace or for a shorter distance. You can gradually increase the pace or amount of time.            (Do not exercise right after a meal or in extreme temperatures, such as above 85 degrees, if the air is really humid, or wind chill is less than 20 degrees)                                             ADDITIONAL INFORMATION:  Avoid getting sick from colds and the flu. Stay away from friends or family that you know may have a contagious illness  Get plenty of rest   Get a flu shot each year.  Get a pneumococcal vaccine shot. If you have had one before, ask your

## 2025-02-19 NOTE — CONSULTS
Inpatient Cardiology Consultation      Reason for Consult:  CHF, LBBB    Consulting Physician: Dr. Rios    Requesting Physician:  Dr. Chaparro     Date of Consultation: 2/19/2025    History of Present Illness:      Mr. Gomez is a 59 year old gentleman who is followed at our practice by Dr. Sethi; he was last seen in outpatient follow up in January 2025. He is also followed by Dr. Ramirez from an electrophysiology standpoint. He has a lengthy past medical history as detailed below. He had been in his usual state of health until Monday of this week when he started to develop weakness, cough and chest congestion after being exposed to a sick roommate. In the ER, he tested positive for influenza A, BNP was 28,161, BUN 23, Cr 2.1, and hs-Rodo 27>>30. CXR was read as showing cardiomegaly with pulmonary edema, similar to the previous of November 2024.    At this time, Mr. Gomez is resting in bed and appears ill but in no acute distress. Prior to becoming ill this week, he denies orthopnea, PND, exertional dyspnea, abdominal bloating or lower extremity edema.       Please note: past medical records were reviewed per electronic medical record (EMR) - see detailed reports under Past Medical/ Surgical History.     Past Medical History:    Nonischemic cardiomyopathy  Reportedly normal coronary arteries on catheterization in 2015 (no report available)  S/p ICD  Chronic HFrEF  VHD mild to moderate MR, mild TR, mild pulmonary hypertension   Hypertension   PAF  [id number] score of 3: on Xarelto  NSVT  Diabetes mellitus type II   Stage III CKD  Marijuana use      Prior Cardiac Testing:      TTE 12/13/2016 (Dr. Sethi):   Summary   No previous echo for comparison. Technically adequate study.   Moderate asymmetric hypertrophy.   Ejection fraction is visually estimated at 20-25%.   Global wall hypokinesis   There is doppler evidence of stage III diastolic dysfunction.   Mild to moderate mitral regurgitation is present.

## 2025-02-19 NOTE — ED NOTES
ED to Inpatient Handoff Report    Notified karina that electronic handoff available and patient ready for transport to room 602.    Safety Risks: None identified    Patient in Restraints: no    Constant Observer or Patient : no    Telemetry Monitoring Ordered :Yes           Order to transfer to unit without monitor:YES    Last MEWS: 2 Time completed: 2000    Deterioration Index Score:   Predictive Model Details          33 (Caution)  Factor Value    Calculated 2/18/2025 20:24 33% Supplemental oxygen Nasal cannula    Deterioration Index Model 28% Age 59 years old     24% Respiratory rate 22     6% WBC count 11.3 k/uL     3% Systolic 134     3% BUN abnormal (23 mg/dL)     1% Pulse 89     1% Sodium 137 mmol/L     0% Temperature 97.9 °F (36.6 °C)     0% Hematocrit 44.0 %     0% Pulse oximetry 96 %     0% Potassium 3.9 mmol/L        Vitals:    02/18/25 1300 02/18/25 1545 02/18/25 1615 02/18/25 2000   BP: (!) 148/97 127/81  (!) 134/94   Pulse: (!) 106  97 89   Resp: 14  30 22   Temp: 97.8 °F (36.6 °C)   97.9 °F (36.6 °C)   TempSrc: Temporal   Oral   SpO2: 95%   96%   Weight: 95.3 kg (210 lb)      Height: 1.829 m (6')            Opportunity for questions and clarification was provided.

## 2025-02-19 NOTE — PLAN OF CARE
Problem: ABCDS Injury Assessment  Goal: Absence of physical injury  2/19/2025 1214 by Taj Douglas, RN  Outcome: Progressing  2/18/2025 2247 by Janae Martinez, RN  Outcome: Progressing     Problem: Discharge Planning  Goal: Discharge to home or other facility with appropriate resources  2/19/2025 1214 by Taj Douglas, RN  Outcome: Progressing  2/18/2025 2247 by Janae Martinez, RN  Outcome: Progressing     Problem: Chronic Conditions and Co-morbidities  Goal: Patient's chronic conditions and co-morbidity symptoms are monitored and maintained or improved  2/19/2025 1214 by Taj Douglas, RN  Outcome: Progressing  2/18/2025 2247 by Janae Martinez RN  Outcome: Progressing

## 2025-02-20 LAB
ANION GAP SERPL CALCULATED.3IONS-SCNC: 14 MMOL/L (ref 7–16)
BASOPHILS # BLD: 0.01 K/UL (ref 0–0.2)
BASOPHILS NFR BLD: 0 % (ref 0–2)
BILIRUB UR QL STRIP: NEGATIVE
BNP SERPL-MCNC: ABNORMAL PG/ML (ref 0–125)
BUN SERPL-MCNC: 42 MG/DL (ref 6–20)
CALCIUM SERPL-MCNC: 8.6 MG/DL (ref 8.6–10.2)
CASTS #/AREA URNS LPF: ABNORMAL /LPF
CHLORIDE SERPL-SCNC: 97 MMOL/L (ref 98–107)
CLARITY UR: CLEAR
CO2 SERPL-SCNC: 23 MMOL/L (ref 22–29)
COLOR UR: YELLOW
CREAT SERPL-MCNC: 2.6 MG/DL (ref 0.7–1.2)
CREAT UR-MCNC: 124.9 MG/DL (ref 40–278)
CREAT UR-MCNC: 179 MG/DL (ref 40–278)
EKG ATRIAL RATE: 93 BPM
EKG P AXIS: 77 DEGREES
EKG P-R INTERVAL: 208 MS
EKG Q-T INTERVAL: 454 MS
EKG QRS DURATION: 196 MS
EKG QTC CALCULATION (BAZETT): 564 MS
EKG R AXIS: 134 DEGREES
EKG T AXIS: 57 DEGREES
EKG VENTRICULAR RATE: 93 BPM
EOSINOPHIL # BLD: 0 K/UL (ref 0.05–0.5)
EOSINOPHILS RELATIVE PERCENT: 0 % (ref 0–6)
ERYTHROCYTE [DISTWIDTH] IN BLOOD BY AUTOMATED COUNT: 13.8 % (ref 11.5–15)
GFR, ESTIMATED: 28 ML/MIN/1.73M2
GLUCOSE BLD-MCNC: 56 MG/DL (ref 74–99)
GLUCOSE BLD-MCNC: 71 MG/DL (ref 74–99)
GLUCOSE BLD-MCNC: 82 MG/DL (ref 74–99)
GLUCOSE BLD-MCNC: 83 MG/DL (ref 74–99)
GLUCOSE SERPL-MCNC: 158 MG/DL (ref 74–99)
GLUCOSE UR STRIP-MCNC: NEGATIVE MG/DL
HCT VFR BLD AUTO: 39.9 % (ref 37–54)
HGB BLD-MCNC: 12.8 G/DL (ref 12.5–16.5)
HGB UR QL STRIP.AUTO: NEGATIVE
IMM GRANULOCYTES # BLD AUTO: 0.04 K/UL (ref 0–0.58)
IMM GRANULOCYTES NFR BLD: 1 % (ref 0–5)
KETONES UR STRIP-MCNC: NEGATIVE MG/DL
LEUKOCYTE ESTERASE UR QL STRIP: NEGATIVE
LYMPHOCYTES NFR BLD: 0.4 K/UL (ref 1.5–4)
LYMPHOCYTES RELATIVE PERCENT: 5 % (ref 20–42)
MAGNESIUM SERPL-MCNC: 1.9 MG/DL (ref 1.6–2.6)
MCH RBC QN AUTO: 29.4 PG (ref 26–35)
MCHC RBC AUTO-ENTMCNC: 32.1 G/DL (ref 32–34.5)
MCV RBC AUTO: 91.5 FL (ref 80–99.9)
MONOCYTES NFR BLD: 0.37 K/UL (ref 0.1–0.95)
MONOCYTES NFR BLD: 5 % (ref 2–12)
NEUTROPHILS NFR BLD: 90 % (ref 43–80)
NEUTS SEG NFR BLD: 7.01 K/UL (ref 1.8–7.3)
NITRITE UR QL STRIP: NEGATIVE
PH UR STRIP: 5.5 [PH] (ref 5–8)
PLATELET, FLUORESCENCE: 133 K/UL (ref 130–450)
PMV BLD AUTO: 11.4 FL (ref 7–12)
POTASSIUM SERPL-SCNC: 3.7 MMOL/L (ref 3.5–5)
PROT UR STRIP-MCNC: 30 MG/DL
RBC # BLD AUTO: 4.36 M/UL (ref 3.8–5.8)
RBC # BLD: ABNORMAL 10*6/UL
RBC #/AREA URNS HPF: ABNORMAL /HPF
SODIUM SERPL-SCNC: 134 MMOL/L (ref 132–146)
SODIUM UR-SCNC: <20 MMOL/L
SP GR UR STRIP: 1.02 (ref 1–1.03)
TOTAL PROTEIN, URINE: 38 MG/DL (ref 0–12)
URINE TOTAL PROTEIN CREATININE RATIO: 0.31 (ref 0–0.2)
UROBILINOGEN UR STRIP-ACNC: 0.2 EU/DL (ref 0–1)
UUN UR-MCNC: 489 MG/DL (ref 800–1666)
WBC #/AREA URNS HPF: ABNORMAL /HPF
WBC OTHER # BLD: 7.8 K/UL (ref 4.5–11.5)

## 2025-02-20 PROCEDURE — 2580000003 HC RX 258: Performed by: INTERNAL MEDICINE

## 2025-02-20 PROCEDURE — 2500000003 HC RX 250 WO HCPCS: Performed by: NURSE PRACTITIONER

## 2025-02-20 PROCEDURE — 6370000000 HC RX 637 (ALT 250 FOR IP): Performed by: HOSPITALIST

## 2025-02-20 PROCEDURE — 82962 GLUCOSE BLOOD TEST: CPT

## 2025-02-20 PROCEDURE — 6370000000 HC RX 637 (ALT 250 FOR IP): Performed by: NURSE PRACTITIONER

## 2025-02-20 PROCEDURE — 84156 ASSAY OF PROTEIN URINE: CPT

## 2025-02-20 PROCEDURE — 93010 ELECTROCARDIOGRAM REPORT: CPT | Performed by: INTERNAL MEDICINE

## 2025-02-20 PROCEDURE — 94640 AIRWAY INHALATION TREATMENT: CPT

## 2025-02-20 PROCEDURE — 85025 COMPLETE CBC W/AUTO DIFF WBC: CPT

## 2025-02-20 PROCEDURE — 83735 ASSAY OF MAGNESIUM: CPT

## 2025-02-20 PROCEDURE — 80048 BASIC METABOLIC PNL TOTAL CA: CPT

## 2025-02-20 PROCEDURE — 82570 ASSAY OF URINE CREATININE: CPT

## 2025-02-20 PROCEDURE — 2060000000 HC ICU INTERMEDIATE R&B

## 2025-02-20 PROCEDURE — 2700000000 HC OXYGEN THERAPY PER DAY

## 2025-02-20 PROCEDURE — 81001 URINALYSIS AUTO W/SCOPE: CPT

## 2025-02-20 PROCEDURE — 2500000003 HC RX 250 WO HCPCS: Performed by: HOSPITALIST

## 2025-02-20 PROCEDURE — 6370000000 HC RX 637 (ALT 250 FOR IP): Performed by: INTERNAL MEDICINE

## 2025-02-20 PROCEDURE — 83880 ASSAY OF NATRIURETIC PEPTIDE: CPT

## 2025-02-20 PROCEDURE — 36415 COLL VENOUS BLD VENIPUNCTURE: CPT

## 2025-02-20 RX ORDER — SODIUM CHLORIDE, SODIUM LACTATE, POTASSIUM CHLORIDE, CALCIUM CHLORIDE 600; 310; 30; 20 MG/100ML; MG/100ML; MG/100ML; MG/100ML
INJECTION, SOLUTION INTRAVENOUS CONTINUOUS
Status: DISCONTINUED | OUTPATIENT
Start: 2025-02-20 | End: 2025-02-21

## 2025-02-20 RX ORDER — IPRATROPIUM BROMIDE AND ALBUTEROL SULFATE 2.5; .5 MG/3ML; MG/3ML
1 SOLUTION RESPIRATORY (INHALATION)
Status: DISCONTINUED | OUTPATIENT
Start: 2025-02-20 | End: 2025-02-24 | Stop reason: HOSPADM

## 2025-02-20 RX ORDER — GUAIFENESIN 400 MG/1
400 TABLET ORAL 3 TIMES DAILY
Status: DISCONTINUED | OUTPATIENT
Start: 2025-02-20 | End: 2025-02-24 | Stop reason: HOSPADM

## 2025-02-20 RX ADMIN — IPRATROPIUM BROMIDE AND ALBUTEROL SULFATE 1 DOSE: 2.5; .5 SOLUTION RESPIRATORY (INHALATION) at 19:31

## 2025-02-20 RX ADMIN — ROSUVASTATIN CALCIUM 10 MG: 10 TABLET, FILM COATED ORAL at 09:45

## 2025-02-20 RX ADMIN — GUAIFENESIN 400 MG: 400 TABLET ORAL at 09:46

## 2025-02-20 RX ADMIN — GUAIFENESIN 200 MG: 200 SOLUTION ORAL at 20:26

## 2025-02-20 RX ADMIN — HYDRALAZINE HYDROCHLORIDE 100 MG: 50 TABLET ORAL at 13:12

## 2025-02-20 RX ADMIN — GLIPIZIDE 5 MG: 5 TABLET ORAL at 06:37

## 2025-02-20 RX ADMIN — HYDRALAZINE HYDROCHLORIDE 100 MG: 50 TABLET ORAL at 09:45

## 2025-02-20 RX ADMIN — OSELTAMIVIR PHOSPHATE 30 MG: 30 CAPSULE ORAL at 20:26

## 2025-02-20 RX ADMIN — RIVAROXABAN 15 MG: 15 TABLET, FILM COATED ORAL at 09:46

## 2025-02-20 RX ADMIN — OSELTAMIVIR PHOSPHATE 30 MG: 30 CAPSULE ORAL at 09:46

## 2025-02-20 RX ADMIN — GUAIFENESIN 200 MG: 200 SOLUTION ORAL at 04:42

## 2025-02-20 RX ADMIN — HYDRALAZINE HYDROCHLORIDE 100 MG: 50 TABLET ORAL at 20:26

## 2025-02-20 RX ADMIN — CARVEDILOL 25 MG: 25 TABLET, FILM COATED ORAL at 16:57

## 2025-02-20 RX ADMIN — GUAIFENESIN 400 MG: 400 TABLET ORAL at 13:12

## 2025-02-20 RX ADMIN — SODIUM CHLORIDE, PRESERVATIVE FREE 10 ML: 5 INJECTION INTRAVENOUS at 20:26

## 2025-02-20 RX ADMIN — IPRATROPIUM BROMIDE AND ALBUTEROL SULFATE 1 DOSE: 2.5; .5 SOLUTION RESPIRATORY (INHALATION) at 12:04

## 2025-02-20 RX ADMIN — GUAIFENESIN 400 MG: 400 TABLET ORAL at 20:26

## 2025-02-20 RX ADMIN — SODIUM CHLORIDE, PRESERVATIVE FREE 10 ML: 5 INJECTION INTRAVENOUS at 13:12

## 2025-02-20 RX ADMIN — SODIUM CHLORIDE, POTASSIUM CHLORIDE, SODIUM LACTATE AND CALCIUM CHLORIDE: 600; 310; 30; 20 INJECTION, SOLUTION INTRAVENOUS at 20:26

## 2025-02-20 RX ADMIN — IPRATROPIUM BROMIDE AND ALBUTEROL SULFATE 1 DOSE: 2.5; .5 SOLUTION RESPIRATORY (INHALATION) at 07:53

## 2025-02-20 RX ADMIN — CARVEDILOL 25 MG: 25 TABLET, FILM COATED ORAL at 09:45

## 2025-02-20 RX ADMIN — ASPIRIN 81 MG: 81 TABLET, COATED ORAL at 09:46

## 2025-02-20 NOTE — CONSULTS
Consult Note      Reason for Consult: avril  Date of Service: 2/20/2025   Chief Complaint: had concerns including Shortness of Breath (Hx CHF, concern for Covid ).  History Obtained From: patient electronic medical record       HISTORY OF PRESENT ILLNESS:     Maryjo Gomez Sr. is a 59 y.o. male with a past medical history of paroxysmal A-fib, hypertension, heart failure, CAD.     They presented on 2/18/2025 complaining of shortness of breath.  He complains of worsening shortness of breath that started the morning of presentation.  In addition he complains of generalized weakness and fatigue for approximately 3 days prior to presentation.  He denies other symptoms.  On arrival to the emergency department he was hypoxic requiring 3 L nasal cannula with the rest of the vitals stable.  He did develop fever on 2/19/2025. Nephrology was consulted for AVRIL.     Past Medical History:   Past Medical History:   Diagnosis Date    Cerebral artery occlusion with cerebral infarction (HCC) 2017    no residual    CHF (congestive heart failure) (HCC)     Colon cancer screening     Dilated cardiomyopathy (HCC)     Follows with Dr. Sethi-last OV     Hypertension     Nonrheumatic mitral (valve) insufficiency     Nonrheumatic tricuspid (valve) insufficiency        Past Surgical History:   Past Surgical History:   Procedure Laterality Date    CARDIAC DEFIBRILLATOR PLACEMENT Left 10/10/2017    [employer] Dual ICD,     COLONOSCOPY N/A 1/20/2021    COLONOSCOPY WITH BIOPSY performed by Marlon Waters MD at [facility] ENDOSCOPY    PACEMAKER PLACEMENT      TONSILLECTOMY         Medications Prior to Admission:   Medications Prior to Admission: rosuvastatin (CRESTOR) 10 MG tablet, Take 1 tablet by mouth daily  aspirin 81 MG EC tablet, Take 1 tablet by mouth daily  sacubitril-valsartan (ENTRESTO) 49-51 MG per tablet, Take 1 tablet by mouth 2 times daily  furosemide (LASIX) 20 MG tablet, Take 1 tablet by mouth daily  hydrALAZINE

## 2025-02-20 NOTE — PLAN OF CARE
Problem: ABCDS Injury Assessment  Goal: Absence of physical injury  2/20/2025 0112 by Maryann Croft RN  Outcome: Progressing     Problem: Discharge Planning  Goal: Discharge to home or other facility with appropriate resources  2/20/2025 0112 by Maryann Croft RN  Outcome: Progressing  Flowsheets (Taken 2/19/2025 2000)  Discharge to home or other facility with appropriate resources:   Identify barriers to discharge with patient and caregiver   Arrange for needed discharge resources and transportation as appropriate     Problem: Chronic Conditions and Co-morbidities  Goal: Patient's chronic conditions and co-morbidity symptoms are monitored and maintained or improved  2/20/2025 0112 by Maryann Croft RN  Outcome: Progressing  Flowsheets (Taken 2/19/2025 2000)  Care Plan - Patient's Chronic Conditions and Co-Morbidity Symptoms are Monitored and Maintained or Improved: Monitor and assess patient's chronic conditions and comorbid symptoms for stability, deterioration, or improvement

## 2025-02-20 NOTE — PLAN OF CARE
Problem: ABCDS Injury Assessment  Goal: Absence of physical injury  2/20/2025 1253 by Taj Douglas RN  Outcome: Progressing  2/20/2025 0112 by Maryann Croft RN  Outcome: Progressing     Problem: Discharge Planning  Goal: Discharge to home or other facility with appropriate resources  2/20/2025 1253 by Taj Douglas RN  Outcome: Progressing  2/20/2025 0112 by Maryann Croft RN  Outcome: Progressing  Flowsheets (Taken 2/19/2025 2000)  Discharge to home or other facility with appropriate resources:   Identify barriers to discharge with patient and caregiver   Arrange for needed discharge resources and transportation as appropriate     Problem: Chronic Conditions and Co-morbidities  Goal: Patient's chronic conditions and co-morbidity symptoms are monitored and maintained or improved  2/20/2025 1253 by Taj Douglas RN  Outcome: Progressing  2/20/2025 0112 by Maryann Croft RN  Outcome: Progressing  Flowsheets (Taken 2/19/2025 2000)  Care Plan - Patient's Chronic Conditions and Co-Morbidity Symptoms are Monitored and Maintained or Improved: Monitor and assess patient's chronic conditions and comorbid symptoms for stability, deterioration, or improvement

## 2025-02-21 ENCOUNTER — TELEPHONE (OUTPATIENT)
Dept: CARDIOLOGY CLINIC | Age: 60
End: 2025-02-21

## 2025-02-21 ENCOUNTER — APPOINTMENT (OUTPATIENT)
Dept: GENERAL RADIOLOGY | Age: 60
DRG: 291 | End: 2025-02-21
Payer: MEDICARE

## 2025-02-21 LAB
ANION GAP SERPL CALCULATED.3IONS-SCNC: 11 MMOL/L (ref 7–16)
BASOPHILS # BLD: 0 K/UL (ref 0–0.2)
BASOPHILS NFR BLD: 0 % (ref 0–2)
BUN SERPL-MCNC: 40 MG/DL (ref 6–20)
CALCIUM SERPL-MCNC: 8.6 MG/DL (ref 8.6–10.2)
CHLORIDE SERPL-SCNC: 99 MMOL/L (ref 98–107)
CO2 SERPL-SCNC: 24 MMOL/L (ref 22–29)
CREAT SERPL-MCNC: 2 MG/DL (ref 0.7–1.2)
EOSINOPHIL # BLD: 0 K/UL (ref 0.05–0.5)
EOSINOPHILS RELATIVE PERCENT: 0 % (ref 0–6)
ERYTHROCYTE [DISTWIDTH] IN BLOOD BY AUTOMATED COUNT: 13.7 % (ref 11.5–15)
GFR, ESTIMATED: 38 ML/MIN/1.73M2
GLUCOSE BLD-MCNC: 102 MG/DL (ref 74–99)
GLUCOSE BLD-MCNC: 133 MG/DL (ref 74–99)
GLUCOSE BLD-MCNC: 66 MG/DL (ref 74–99)
GLUCOSE BLD-MCNC: 83 MG/DL (ref 74–99)
GLUCOSE SERPL-MCNC: 66 MG/DL (ref 74–99)
HCT VFR BLD AUTO: 39.3 % (ref 37–54)
HGB BLD-MCNC: 12.6 G/DL (ref 12.5–16.5)
LYMPHOCYTES NFR BLD: 0.33 K/UL (ref 1.5–4)
LYMPHOCYTES RELATIVE PERCENT: 5 % (ref 20–42)
MAGNESIUM SERPL-MCNC: 2.1 MG/DL (ref 1.6–2.6)
MCH RBC QN AUTO: 29 PG (ref 26–35)
MCHC RBC AUTO-ENTMCNC: 32.1 G/DL (ref 32–34.5)
MCV RBC AUTO: 90.6 FL (ref 80–99.9)
MONOCYTES NFR BLD: 0.16 K/UL (ref 0.1–0.95)
MONOCYTES NFR BLD: 3 % (ref 2–12)
NEUTROPHILS NFR BLD: 92 % (ref 43–80)
NEUTS SEG NFR BLD: 5.81 K/UL (ref 1.8–7.3)
PLATELET, FLUORESCENCE: 137 K/UL (ref 130–450)
PMV BLD AUTO: 10.3 FL (ref 7–12)
POTASSIUM SERPL-SCNC: 3.8 MMOL/L (ref 3.5–5)
RBC # BLD AUTO: 4.34 M/UL (ref 3.8–5.8)
RBC # BLD: ABNORMAL 10*6/UL
SODIUM SERPL-SCNC: 134 MMOL/L (ref 132–146)
WBC OTHER # BLD: 6.3 K/UL (ref 4.5–11.5)

## 2025-02-21 PROCEDURE — 94640 AIRWAY INHALATION TREATMENT: CPT

## 2025-02-21 PROCEDURE — 6370000000 HC RX 637 (ALT 250 FOR IP): Performed by: NURSE PRACTITIONER

## 2025-02-21 PROCEDURE — 6370000000 HC RX 637 (ALT 250 FOR IP): Performed by: INTERNAL MEDICINE

## 2025-02-21 PROCEDURE — 83735 ASSAY OF MAGNESIUM: CPT

## 2025-02-21 PROCEDURE — 2500000003 HC RX 250 WO HCPCS: Performed by: HOSPITALIST

## 2025-02-21 PROCEDURE — 82962 GLUCOSE BLOOD TEST: CPT

## 2025-02-21 PROCEDURE — 2700000000 HC OXYGEN THERAPY PER DAY

## 2025-02-21 PROCEDURE — 6370000000 HC RX 637 (ALT 250 FOR IP): Performed by: HOSPITALIST

## 2025-02-21 PROCEDURE — 80048 BASIC METABOLIC PNL TOTAL CA: CPT

## 2025-02-21 PROCEDURE — 85025 COMPLETE CBC W/AUTO DIFF WBC: CPT

## 2025-02-21 PROCEDURE — 99232 SBSQ HOSP IP/OBS MODERATE 35: CPT | Performed by: INTERNAL MEDICINE

## 2025-02-21 PROCEDURE — 71045 X-RAY EXAM CHEST 1 VIEW: CPT

## 2025-02-21 PROCEDURE — APPSS30 APP SPLIT SHARED TIME 16-30 MINUTES: Performed by: NURSE PRACTITIONER

## 2025-02-21 PROCEDURE — 2060000000 HC ICU INTERMEDIATE R&B

## 2025-02-21 RX ORDER — METOPROLOL SUCCINATE 25 MG/1
25 TABLET, EXTENDED RELEASE ORAL 2 TIMES DAILY
Status: DISCONTINUED | OUTPATIENT
Start: 2025-02-21 | End: 2025-02-24 | Stop reason: HOSPADM

## 2025-02-21 RX ORDER — POTASSIUM CHLORIDE 1500 MG/1
20 TABLET, EXTENDED RELEASE ORAL ONCE
Status: COMPLETED | OUTPATIENT
Start: 2025-02-21 | End: 2025-02-21

## 2025-02-21 RX ADMIN — IPRATROPIUM BROMIDE AND ALBUTEROL SULFATE 1 DOSE: 2.5; .5 SOLUTION RESPIRATORY (INHALATION) at 11:55

## 2025-02-21 RX ADMIN — Medication 3 MG: at 20:59

## 2025-02-21 RX ADMIN — SODIUM CHLORIDE, PRESERVATIVE FREE 10 ML: 5 INJECTION INTRAVENOUS at 20:59

## 2025-02-21 RX ADMIN — GUAIFENESIN 400 MG: 400 TABLET ORAL at 14:55

## 2025-02-21 RX ADMIN — RIVAROXABAN 15 MG: 15 TABLET, FILM COATED ORAL at 08:59

## 2025-02-21 RX ADMIN — OSELTAMIVIR PHOSPHATE 30 MG: 30 CAPSULE ORAL at 20:54

## 2025-02-21 RX ADMIN — IPRATROPIUM BROMIDE AND ALBUTEROL SULFATE 1 DOSE: 2.5; .5 SOLUTION RESPIRATORY (INHALATION) at 07:44

## 2025-02-21 RX ADMIN — IPRATROPIUM BROMIDE AND ALBUTEROL SULFATE 1 DOSE: 2.5; .5 SOLUTION RESPIRATORY (INHALATION) at 15:54

## 2025-02-21 RX ADMIN — IPRATROPIUM BROMIDE AND ALBUTEROL SULFATE 1 DOSE: 2.5; .5 SOLUTION RESPIRATORY (INHALATION) at 19:25

## 2025-02-21 RX ADMIN — METOPROLOL SUCCINATE 25 MG: 25 TABLET, EXTENDED RELEASE ORAL at 20:54

## 2025-02-21 RX ADMIN — GUAIFENESIN 400 MG: 400 TABLET ORAL at 20:54

## 2025-02-21 RX ADMIN — Medication 3 MG: at 01:57

## 2025-02-21 RX ADMIN — POTASSIUM CHLORIDE 20 MEQ: 1500 TABLET, EXTENDED RELEASE ORAL at 10:19

## 2025-02-21 RX ADMIN — HYDRALAZINE HYDROCHLORIDE 100 MG: 50 TABLET ORAL at 10:19

## 2025-02-21 RX ADMIN — GUAIFENESIN 400 MG: 400 TABLET ORAL at 10:19

## 2025-02-21 RX ADMIN — HYDRALAZINE HYDROCHLORIDE 100 MG: 50 TABLET ORAL at 14:55

## 2025-02-21 RX ADMIN — HYDRALAZINE HYDROCHLORIDE 100 MG: 50 TABLET ORAL at 20:54

## 2025-02-21 RX ADMIN — SODIUM CHLORIDE, PRESERVATIVE FREE 10 ML: 5 INJECTION INTRAVENOUS at 08:59

## 2025-02-21 RX ADMIN — ROSUVASTATIN CALCIUM 10 MG: 10 TABLET, FILM COATED ORAL at 10:20

## 2025-02-21 RX ADMIN — OSELTAMIVIR PHOSPHATE 30 MG: 30 CAPSULE ORAL at 10:18

## 2025-02-21 RX ADMIN — METOPROLOL SUCCINATE 25 MG: 25 TABLET, EXTENDED RELEASE ORAL at 12:32

## 2025-02-21 RX ADMIN — CARVEDILOL 25 MG: 25 TABLET, FILM COATED ORAL at 08:58

## 2025-02-21 RX ADMIN — ASPIRIN 81 MG: 81 TABLET, COATED ORAL at 10:19

## 2025-02-21 NOTE — PLAN OF CARE
Problem: ABCDS Injury Assessment  Goal: Absence of physical injury  2/20/2025 2346 by Janae Martinez, RN  Outcome: Progressing  2/20/2025 1253 by Taj Douglas RN  Outcome: Progressing     Problem: Discharge Planning  Goal: Discharge to home or other facility with appropriate resources  2/20/2025 2346 by Janae Martinez RN  Outcome: Progressing  2/20/2025 1253 by Taj Douglas RN  Outcome: Progressing     Problem: Chronic Conditions and Co-morbidities  Goal: Patient's chronic conditions and co-morbidity symptoms are monitored and maintained or improved  2/20/2025 2346 by Janae Martinez RN  Outcome: Progressing  2/20/2025 1253 by Taj Douglas RN  Outcome: Progressing     Problem: Safety - Adult  Goal: Free from fall injury  Outcome: Progressing

## 2025-02-21 NOTE — TELEPHONE ENCOUNTER
Gladis Sorensen, Irais Dewey MA Hello! I would like to get the patient a post hospital follow up with Dr Sethi if possible. He is still admitted but expect him to be discharged soon. Thank you!    Gladis MELENDEZ, RN  Heart Failure Navigator    Patient is scheduled.

## 2025-02-21 NOTE — CONSULTS
INPATIENT CARDIOLOGY FOLLOW-UP    Name: Maryjo Gomez Sr.    Age: 59 y.o.    Date of Service: 2/21/2025    Chief Complaint: Follow-up for acute on chronic HFrEF, VHD, influenza A    Interim History:  Admitted for further evaluation of cough/URI symptoms (+influenza A infection). Currently with no chest pain, respiratory distress, or palpitations at rest (still on O2). SR on EKG. Telemetry showing SR with episodes of NSVT up to 15 beats. No new overnight cardiac complaints.    Review of Systems:   Cardiac: As per HPI  General: No fever, chills  Pulmonary: As per HPI  HEENT: No visual disturbances, difficult swallowing  GI: No nausea, vomiting  : No dysuria, hematuria  Endocrine: No thyroid disease, +DM  Musculoskeletal: PELAEZ x 4, no focal motor deficits  Skin: Intact, no rashes  Neuro: No headache, seizures  Psych: Currently with no depression, anxiety    Problem List:  Patient Active Problem List   Diagnosis    CHF (congestive heart failure) ([facility])    Chest pain    S/P ICD (internal cardiac defibrillator) procedure    Dilated cardiomyopathy ([facility])    ICD (implantable cardioverter-defibrillator) malfunction, subsequent encounter    Essential hypertension    Dyspnea    Wheezing    Fever    Acute on chronic systolic CHF (congestive heart failure) ([facility])    Nonrheumatic tricuspid (valve) insufficiency    Influenza with respiratory manifestation other than pneumonia    Acute hypoxic respiratory failure ([facility])    Nonrheumatic mitral valve regurgitation    PAF (paroxysmal atrial fibrillation) ([facility])    LBBB (left bundle branch block)       Allergies:  No Known Allergies    Current Medications:  Current Facility-Administered Medications   Medication Dose Route Frequency Provider Last Rate Last Admin    guaiFENesin tablet 400 mg  400 mg Oral TID Leticia Rodney APRN - CNP   400 mg at 02/20/25 2026    ipratropium 0.5 mg-albuterol 2.5 mg (DUONEB) nebulizer solution 1 Dose  1 Dose Inhalation Q4H WA RT Leticia Rodney APRN -

## 2025-02-21 NOTE — CARE COORDINATION
Social work / Discharge planning:         Patient states he lives alone and ambulates independently.     No history of HC or WOODY.     Patient was on room air during conversation but has been requiring 2 liters.    As discussed in IDR, patient will need pulse ox testing.   Electronically signed by LADI Guerin on 2/21/2025 at 1:43 PM

## 2025-02-21 NOTE — PLAN OF CARE
Problem: ABCDS Injury Assessment  Goal: Absence of physical injury  2/21/2025 1257 by Taj Douglas RN  Outcome: Progressing  2/20/2025 2346 by Janae Martinez RN  Outcome: Progressing     Problem: Discharge Planning  Goal: Discharge to home or other facility with appropriate resources  2/21/2025 1257 by Taj Douglas, RN  Outcome: Progressing  2/20/2025 2346 by Janae Martinez RN  Outcome: Progressing     Problem: Chronic Conditions and Co-morbidities  Goal: Patient's chronic conditions and co-morbidity symptoms are monitored and maintained or improved  2/21/2025 1257 by Taj Douglas RN  Outcome: Progressing  2/20/2025 2346 by Janae Martinez RN  Outcome: Progressing     Problem: Safety - Adult  Goal: Free from fall injury  2/21/2025 1257 by Taj Douglas, RN  Outcome: Progressing  2/20/2025 2346 by Janae Martinez, RN  Outcome: Progressing

## 2025-02-21 NOTE — CONSULTS
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 is a 59 y.o. (1965) male with a history of HFrEF, most recent EF:  Lab Results   Component Value Date    LVEF 25 03/27/2023       Patient was awake and alert, laying in bed during the consultation and is agreeable to heart failure education. He was engaged and asked appropriate questions throughout the education session. He is feeling ok but is very tired. He did not sleep well last night. He would like follow up appointments at [place].     Barriers identified during consult contributing to HF Hospitalization:  [] Limited medication adherence   [] Poor health literacy, education regarding HF medications provided   [] Pill box provided to patient  [] Difficulty affording medications  [] Difficulty obtaining/ managing medications  [] Prescription assistance information given     [] Not weighing themselves daily  [x] Weight log provided for easy monitoring  [] Scale provided     [] Not following low sodium diet  [] Food insecurity   [x] 2 gram sodium diet education provided   [] Low sodium recipes provided  [] Sodium free seasoning provided   [] Low sodium meal delivery options given to patient  [] Dietician consulted     [] Lack of transportation to appointments     [] Depression, given chronic illness  [] Primary team notified     [] Goals of care need addressed  [] Palliative care consulted     [] CHF CHW consulted, to assist with     Chart Reviewed:  Diet: ADULT DIET; Regular; No Added Salt (3-4 gm); No Pork   Daily Weights: Patient Vitals for the past 96 hrs (Last 3 readings):   Weight   02/21/25 0340 92.9 kg (204 lb 12.8 oz)   02/19/25 0600 92.5 kg (204 lb)   02/18/25 1300 95.3 kg (210 lb)     I/O:   Intake/Output Summary (Last 24 hours) at 2/21/2025 1258  Last data filed at 2/21/2025 0623  Gross per 24 hour   Intake --   Output 575 ml   Net -575 ml       []

## 2025-02-22 LAB
ANION GAP SERPL CALCULATED.3IONS-SCNC: 11 MMOL/L (ref 7–16)
BASOPHILS # BLD: 0.01 K/UL (ref 0–0.2)
BASOPHILS NFR BLD: 0 % (ref 0–2)
BNP SERPL-MCNC: 7752 PG/ML (ref 0–125)
BUN SERPL-MCNC: 36 MG/DL (ref 6–20)
CALCIUM SERPL-MCNC: 8.5 MG/DL (ref 8.6–10.2)
CHLORIDE SERPL-SCNC: 99 MMOL/L (ref 98–107)
CO2 SERPL-SCNC: 26 MMOL/L (ref 22–29)
CREAT SERPL-MCNC: 1.7 MG/DL (ref 0.7–1.2)
EOSINOPHIL # BLD: 0.01 K/UL (ref 0.05–0.5)
EOSINOPHILS RELATIVE PERCENT: 0 % (ref 0–6)
ERYTHROCYTE [DISTWIDTH] IN BLOOD BY AUTOMATED COUNT: 13.7 % (ref 11.5–15)
GFR, ESTIMATED: 47 ML/MIN/1.73M2
GLUCOSE BLD-MCNC: 132 MG/DL (ref 74–99)
GLUCOSE BLD-MCNC: 158 MG/DL (ref 74–99)
GLUCOSE BLD-MCNC: 159 MG/DL (ref 74–99)
GLUCOSE SERPL-MCNC: 164 MG/DL (ref 74–99)
HCT VFR BLD AUTO: 40 % (ref 37–54)
HGB BLD-MCNC: 12.9 G/DL (ref 12.5–16.5)
IMM GRANULOCYTES # BLD AUTO: <0.03 K/UL (ref 0–0.58)
IMM GRANULOCYTES NFR BLD: 0 % (ref 0–5)
LYMPHOCYTES NFR BLD: 0.62 K/UL (ref 1.5–4)
LYMPHOCYTES RELATIVE PERCENT: 11 % (ref 20–42)
MAGNESIUM SERPL-MCNC: 2.1 MG/DL (ref 1.6–2.6)
MCH RBC QN AUTO: 29.1 PG (ref 26–35)
MCHC RBC AUTO-ENTMCNC: 32.3 G/DL (ref 32–34.5)
MCV RBC AUTO: 90.3 FL (ref 80–99.9)
MONOCYTES NFR BLD: 0.51 K/UL (ref 0.1–0.95)
MONOCYTES NFR BLD: 9 % (ref 2–12)
NEUTROPHILS NFR BLD: 80 % (ref 43–80)
NEUTS SEG NFR BLD: 4.61 K/UL (ref 1.8–7.3)
PLATELET # BLD AUTO: 157 K/UL (ref 130–450)
PMV BLD AUTO: 10.9 FL (ref 7–12)
POTASSIUM SERPL-SCNC: 3.8 MMOL/L (ref 3.5–5)
RBC # BLD AUTO: 4.43 M/UL (ref 3.8–5.8)
SODIUM SERPL-SCNC: 136 MMOL/L (ref 132–146)
WBC OTHER # BLD: 5.8 K/UL (ref 4.5–11.5)

## 2025-02-22 PROCEDURE — 6370000000 HC RX 637 (ALT 250 FOR IP): Performed by: NURSE PRACTITIONER

## 2025-02-22 PROCEDURE — 83880 ASSAY OF NATRIURETIC PEPTIDE: CPT

## 2025-02-22 PROCEDURE — 99232 SBSQ HOSP IP/OBS MODERATE 35: CPT | Performed by: INTERNAL MEDICINE

## 2025-02-22 PROCEDURE — 94640 AIRWAY INHALATION TREATMENT: CPT

## 2025-02-22 PROCEDURE — 6360000002 HC RX W HCPCS: Performed by: INTERNAL MEDICINE

## 2025-02-22 PROCEDURE — 2500000003 HC RX 250 WO HCPCS: Performed by: NURSE PRACTITIONER

## 2025-02-22 PROCEDURE — 85025 COMPLETE CBC W/AUTO DIFF WBC: CPT

## 2025-02-22 PROCEDURE — 6370000000 HC RX 637 (ALT 250 FOR IP): Performed by: INTERNAL MEDICINE

## 2025-02-22 PROCEDURE — 6370000000 HC RX 637 (ALT 250 FOR IP): Performed by: HOSPITALIST

## 2025-02-22 PROCEDURE — 6370000000 HC RX 637 (ALT 250 FOR IP)

## 2025-02-22 PROCEDURE — 83735 ASSAY OF MAGNESIUM: CPT

## 2025-02-22 PROCEDURE — 82962 GLUCOSE BLOOD TEST: CPT

## 2025-02-22 PROCEDURE — 2060000000 HC ICU INTERMEDIATE R&B

## 2025-02-22 PROCEDURE — 2500000003 HC RX 250 WO HCPCS: Performed by: HOSPITALIST

## 2025-02-22 PROCEDURE — 2700000000 HC OXYGEN THERAPY PER DAY

## 2025-02-22 PROCEDURE — 80048 BASIC METABOLIC PNL TOTAL CA: CPT

## 2025-02-22 RX ORDER — DIPHENHYDRAMINE HCL 25 MG
50 TABLET ORAL ONCE
Status: COMPLETED | OUTPATIENT
Start: 2025-02-22 | End: 2025-02-22

## 2025-02-22 RX ADMIN — OSELTAMIVIR PHOSPHATE 30 MG: 30 CAPSULE ORAL at 20:19

## 2025-02-22 RX ADMIN — METOPROLOL SUCCINATE 25 MG: 25 TABLET, EXTENDED RELEASE ORAL at 20:19

## 2025-02-22 RX ADMIN — IPRATROPIUM BROMIDE AND ALBUTEROL SULFATE 1 DOSE: 2.5; .5 SOLUTION RESPIRATORY (INHALATION) at 20:39

## 2025-02-22 RX ADMIN — GLIPIZIDE 5 MG: 5 TABLET ORAL at 06:59

## 2025-02-22 RX ADMIN — HYDRALAZINE HYDROCHLORIDE 100 MG: 50 TABLET ORAL at 13:48

## 2025-02-22 RX ADMIN — HYDRALAZINE HYDROCHLORIDE 100 MG: 50 TABLET ORAL at 08:50

## 2025-02-22 RX ADMIN — GUAIFENESIN 200 MG: 200 SOLUTION ORAL at 20:19

## 2025-02-22 RX ADMIN — GUAIFENESIN 400 MG: 400 TABLET ORAL at 20:19

## 2025-02-22 RX ADMIN — GUAIFENESIN 400 MG: 400 TABLET ORAL at 08:49

## 2025-02-22 RX ADMIN — DIPHENHYDRAMINE HCL 50 MG: 25 TABLET ORAL at 13:48

## 2025-02-22 RX ADMIN — GUAIFENESIN 400 MG: 400 TABLET ORAL at 13:48

## 2025-02-22 RX ADMIN — OSELTAMIVIR PHOSPHATE 30 MG: 30 CAPSULE ORAL at 08:50

## 2025-02-22 RX ADMIN — METOPROLOL SUCCINATE 25 MG: 25 TABLET, EXTENDED RELEASE ORAL at 08:49

## 2025-02-22 RX ADMIN — IPRATROPIUM BROMIDE AND ALBUTEROL SULFATE 1 DOSE: 2.5; .5 SOLUTION RESPIRATORY (INHALATION) at 13:15

## 2025-02-22 RX ADMIN — Medication 3 MG: at 01:37

## 2025-02-22 RX ADMIN — ROSUVASTATIN CALCIUM 10 MG: 10 TABLET, FILM COATED ORAL at 08:49

## 2025-02-22 RX ADMIN — RIVAROXABAN 15 MG: 15 TABLET, FILM COATED ORAL at 08:50

## 2025-02-22 RX ADMIN — IPRATROPIUM BROMIDE AND ALBUTEROL SULFATE 1 DOSE: 2.5; .5 SOLUTION RESPIRATORY (INHALATION) at 09:11

## 2025-02-22 RX ADMIN — FUROSEMIDE 40 MG: 10 INJECTION, SOLUTION INTRAMUSCULAR; INTRAVENOUS at 17:53

## 2025-02-22 RX ADMIN — Medication 3 MG: at 20:19

## 2025-02-22 RX ADMIN — IPRATROPIUM BROMIDE AND ALBUTEROL SULFATE 1 DOSE: 2.5; .5 SOLUTION RESPIRATORY (INHALATION) at 16:22

## 2025-02-22 RX ADMIN — ASPIRIN 81 MG: 81 TABLET, COATED ORAL at 08:50

## 2025-02-22 RX ADMIN — SODIUM CHLORIDE, PRESERVATIVE FREE 10 ML: 5 INJECTION INTRAVENOUS at 08:50

## 2025-02-22 RX ADMIN — HYDRALAZINE HYDROCHLORIDE 100 MG: 50 TABLET ORAL at 20:19

## 2025-02-22 RX ADMIN — SODIUM CHLORIDE, PRESERVATIVE FREE 10 ML: 5 INJECTION INTRAVENOUS at 20:20

## 2025-02-23 LAB
ALBUMIN SERPL-MCNC: 3.6 G/DL (ref 3.5–5.2)
ALP SERPL-CCNC: 62 U/L (ref 40–129)
ALT SERPL-CCNC: 46 U/L (ref 0–40)
ANION GAP SERPL CALCULATED.3IONS-SCNC: 12 MMOL/L (ref 7–16)
AST SERPL-CCNC: 25 U/L (ref 0–39)
ATYPICAL LYMPHOCYTE ABSOLUTE COUNT: 0.15 K/UL (ref 0–0.46)
ATYPICAL LYMPHOCYTES: 2 % (ref 0–4)
BASOPHILS # BLD: 0 K/UL (ref 0–0.2)
BASOPHILS NFR BLD: 0 % (ref 0–2)
BILIRUB SERPL-MCNC: 1.6 MG/DL (ref 0–1.2)
BUN SERPL-MCNC: 27 MG/DL (ref 6–20)
CALCIUM SERPL-MCNC: 8.8 MG/DL (ref 8.6–10.2)
CHLORIDE SERPL-SCNC: 100 MMOL/L (ref 98–107)
CO2 SERPL-SCNC: 25 MMOL/L (ref 22–29)
CREAT SERPL-MCNC: 1.7 MG/DL (ref 0.7–1.2)
EOSINOPHIL # BLD: 0 K/UL (ref 0.05–0.5)
EOSINOPHILS RELATIVE PERCENT: 0 % (ref 0–6)
ERYTHROCYTE [DISTWIDTH] IN BLOOD BY AUTOMATED COUNT: 13.4 % (ref 11.5–15)
GFR, ESTIMATED: 47 ML/MIN/1.73M2
GLUCOSE BLD-MCNC: 139 MG/DL (ref 74–99)
GLUCOSE BLD-MCNC: 181 MG/DL (ref 74–99)
GLUCOSE BLD-MCNC: 241 MG/DL (ref 74–99)
GLUCOSE BLD-MCNC: 86 MG/DL (ref 74–99)
GLUCOSE BLD-MCNC: 86 MG/DL (ref 74–99)
GLUCOSE SERPL-MCNC: 85 MG/DL (ref 74–99)
HCT VFR BLD AUTO: 40 % (ref 37–54)
HGB BLD-MCNC: 13 G/DL (ref 12.5–16.5)
LYMPHOCYTES NFR BLD: 1.18 K/UL (ref 1.5–4)
LYMPHOCYTES RELATIVE PERCENT: 14 % (ref 20–42)
MAGNESIUM SERPL-MCNC: 1.8 MG/DL (ref 1.6–2.6)
MCH RBC QN AUTO: 28.8 PG (ref 26–35)
MCHC RBC AUTO-ENTMCNC: 32.5 G/DL (ref 32–34.5)
MCV RBC AUTO: 88.7 FL (ref 80–99.9)
MONOCYTES NFR BLD: 0.89 K/UL (ref 0.1–0.95)
MONOCYTES NFR BLD: 10 % (ref 2–12)
NEUTROPHILS NFR BLD: 74 % (ref 43–80)
NEUTS SEG NFR BLD: 6.28 K/UL (ref 1.8–7.3)
PHOSPHATE SERPL-MCNC: 1.9 MG/DL (ref 2.5–4.5)
PLATELET # BLD AUTO: 162 K/UL (ref 130–450)
PMV BLD AUTO: 10.5 FL (ref 7–12)
POTASSIUM SERPL-SCNC: 3.2 MMOL/L (ref 3.5–5)
PROT SERPL-MCNC: 7 G/DL (ref 6.4–8.3)
RBC # BLD AUTO: 4.51 M/UL (ref 3.8–5.8)
RBC # BLD: ABNORMAL 10*6/UL
SODIUM SERPL-SCNC: 137 MMOL/L (ref 132–146)
WBC OTHER # BLD: 8.5 K/UL (ref 4.5–11.5)

## 2025-02-23 PROCEDURE — 6370000000 HC RX 637 (ALT 250 FOR IP): Performed by: HOSPITALIST

## 2025-02-23 PROCEDURE — 2500000003 HC RX 250 WO HCPCS: Performed by: HOSPITALIST

## 2025-02-23 PROCEDURE — 6370000000 HC RX 637 (ALT 250 FOR IP): Performed by: INTERNAL MEDICINE

## 2025-02-23 PROCEDURE — 80053 COMPREHEN METABOLIC PANEL: CPT

## 2025-02-23 PROCEDURE — 94640 AIRWAY INHALATION TREATMENT: CPT

## 2025-02-23 PROCEDURE — 2500000003 HC RX 250 WO HCPCS: Performed by: NURSE PRACTITIONER

## 2025-02-23 PROCEDURE — 2060000000 HC ICU INTERMEDIATE R&B

## 2025-02-23 PROCEDURE — 83735 ASSAY OF MAGNESIUM: CPT

## 2025-02-23 PROCEDURE — 6370000000 HC RX 637 (ALT 250 FOR IP): Performed by: NURSE PRACTITIONER

## 2025-02-23 PROCEDURE — 6360000002 HC RX W HCPCS: Performed by: INTERNAL MEDICINE

## 2025-02-23 PROCEDURE — 93005 ELECTROCARDIOGRAM TRACING: CPT | Performed by: INTERNAL MEDICINE

## 2025-02-23 PROCEDURE — 99232 SBSQ HOSP IP/OBS MODERATE 35: CPT | Performed by: INTERNAL MEDICINE

## 2025-02-23 PROCEDURE — 85025 COMPLETE CBC W/AUTO DIFF WBC: CPT

## 2025-02-23 PROCEDURE — 82962 GLUCOSE BLOOD TEST: CPT

## 2025-02-23 PROCEDURE — 84100 ASSAY OF PHOSPHORUS: CPT

## 2025-02-23 RX ORDER — POTASSIUM CHLORIDE 1500 MG/1
40 TABLET, EXTENDED RELEASE ORAL ONCE
Status: COMPLETED | OUTPATIENT
Start: 2025-02-23 | End: 2025-02-24

## 2025-02-23 RX ADMIN — METOPROLOL SUCCINATE 25 MG: 25 TABLET, EXTENDED RELEASE ORAL at 08:54

## 2025-02-23 RX ADMIN — HYDRALAZINE HYDROCHLORIDE 100 MG: 50 TABLET ORAL at 08:54

## 2025-02-23 RX ADMIN — HYDRALAZINE HYDROCHLORIDE 100 MG: 50 TABLET ORAL at 14:50

## 2025-02-23 RX ADMIN — IPRATROPIUM BROMIDE AND ALBUTEROL SULFATE 1 DOSE: 2.5; .5 SOLUTION RESPIRATORY (INHALATION) at 12:30

## 2025-02-23 RX ADMIN — Medication 3 MG: at 20:42

## 2025-02-23 RX ADMIN — SODIUM CHLORIDE, PRESERVATIVE FREE 10 ML: 5 INJECTION INTRAVENOUS at 08:55

## 2025-02-23 RX ADMIN — POTASSIUM BICARBONATE 40 MEQ: 782 TABLET, EFFERVESCENT ORAL at 08:53

## 2025-02-23 RX ADMIN — OSELTAMIVIR PHOSPHATE 30 MG: 30 CAPSULE ORAL at 08:54

## 2025-02-23 RX ADMIN — ASPIRIN 81 MG: 81 TABLET, COATED ORAL at 08:54

## 2025-02-23 RX ADMIN — HYDRALAZINE HYDROCHLORIDE 100 MG: 50 TABLET ORAL at 20:25

## 2025-02-23 RX ADMIN — IPRATROPIUM BROMIDE AND ALBUTEROL SULFATE 1 DOSE: 2.5; .5 SOLUTION RESPIRATORY (INHALATION) at 16:53

## 2025-02-23 RX ADMIN — FUROSEMIDE 40 MG: 10 INJECTION, SOLUTION INTRAMUSCULAR; INTRAVENOUS at 08:50

## 2025-02-23 RX ADMIN — GUAIFENESIN 400 MG: 400 TABLET ORAL at 20:25

## 2025-02-23 RX ADMIN — ROSUVASTATIN CALCIUM 10 MG: 10 TABLET, FILM COATED ORAL at 08:54

## 2025-02-23 RX ADMIN — SODIUM CHLORIDE, PRESERVATIVE FREE 10 ML: 5 INJECTION INTRAVENOUS at 20:26

## 2025-02-23 RX ADMIN — INSULIN LISPRO 2 UNITS: 100 INJECTION, SOLUTION INTRAVENOUS; SUBCUTANEOUS at 20:27

## 2025-02-23 RX ADMIN — GUAIFENESIN 400 MG: 400 TABLET ORAL at 14:50

## 2025-02-23 RX ADMIN — METOPROLOL SUCCINATE 25 MG: 25 TABLET, EXTENDED RELEASE ORAL at 20:25

## 2025-02-23 RX ADMIN — GUAIFENESIN 200 MG: 200 SOLUTION ORAL at 00:23

## 2025-02-23 RX ADMIN — FUROSEMIDE 40 MG: 10 INJECTION, SOLUTION INTRAMUSCULAR; INTRAVENOUS at 17:25

## 2025-02-23 RX ADMIN — SODIUM CHLORIDE, PRESERVATIVE FREE 10 ML: 5 INJECTION INTRAVENOUS at 20:25

## 2025-02-23 RX ADMIN — IPRATROPIUM BROMIDE AND ALBUTEROL SULFATE 1 DOSE: 2.5; .5 SOLUTION RESPIRATORY (INHALATION) at 20:09

## 2025-02-23 RX ADMIN — IPRATROPIUM BROMIDE AND ALBUTEROL SULFATE 1 DOSE: 2.5; .5 SOLUTION RESPIRATORY (INHALATION) at 08:56

## 2025-02-23 RX ADMIN — GUAIFENESIN 400 MG: 400 TABLET ORAL at 08:54

## 2025-02-23 RX ADMIN — RIVAROXABAN 15 MG: 15 TABLET, FILM COATED ORAL at 08:54

## 2025-02-24 VITALS
BODY MASS INDEX: 27.47 KG/M2 | DIASTOLIC BLOOD PRESSURE: 86 MMHG | HEIGHT: 72 IN | RESPIRATION RATE: 18 BRPM | OXYGEN SATURATION: 94 % | TEMPERATURE: 98.4 F | SYSTOLIC BLOOD PRESSURE: 128 MMHG | WEIGHT: 202.82 LBS | HEART RATE: 77 BPM

## 2025-02-24 LAB
ANION GAP SERPL CALCULATED.3IONS-SCNC: 11 MMOL/L (ref 7–16)
ATYPICAL LYMPHOCYTE ABSOLUTE COUNT: 0.08 K/UL (ref 0–0.46)
ATYPICAL LYMPHOCYTES: 1 % (ref 0–4)
BASOPHILS # BLD: 0 K/UL (ref 0–0.2)
BASOPHILS NFR BLD: 0 % (ref 0–2)
BNP SERPL-MCNC: ABNORMAL PG/ML (ref 0–125)
BUN SERPL-MCNC: 24 MG/DL (ref 6–20)
CALCIUM SERPL-MCNC: 9.2 MG/DL (ref 8.6–10.2)
CHLORIDE SERPL-SCNC: 98 MMOL/L (ref 98–107)
CO2 SERPL-SCNC: 31 MMOL/L (ref 22–29)
CREAT SERPL-MCNC: 1.7 MG/DL (ref 0.7–1.2)
EKG ATRIAL RATE: 129 BPM
EKG Q-T INTERVAL: 346 MS
EKG QRS DURATION: 140 MS
EKG QTC CALCULATION (BAZETT): 516 MS
EKG R AXIS: 74 DEGREES
EKG T AXIS: 27 DEGREES
EKG VENTRICULAR RATE: 134 BPM
EOSINOPHIL # BLD: 0 K/UL (ref 0.05–0.5)
EOSINOPHILS RELATIVE PERCENT: 0 % (ref 0–6)
ERYTHROCYTE [DISTWIDTH] IN BLOOD BY AUTOMATED COUNT: 13.4 % (ref 11.5–15)
GFR, ESTIMATED: 48 ML/MIN/1.73M2
GLUCOSE BLD-MCNC: 108 MG/DL (ref 74–99)
GLUCOSE BLD-MCNC: 112 MG/DL (ref 74–99)
GLUCOSE SERPL-MCNC: 105 MG/DL (ref 74–99)
HCT VFR BLD AUTO: 41.2 % (ref 37–54)
HGB BLD-MCNC: 13.4 G/DL (ref 12.5–16.5)
LYMPHOCYTES NFR BLD: 1.61 K/UL (ref 1.5–4)
LYMPHOCYTES RELATIVE PERCENT: 18 % (ref 20–42)
MAGNESIUM SERPL-MCNC: 1.7 MG/DL (ref 1.6–2.6)
MCH RBC QN AUTO: 28.9 PG (ref 26–35)
MCHC RBC AUTO-ENTMCNC: 32.5 G/DL (ref 32–34.5)
MCV RBC AUTO: 89 FL (ref 80–99.9)
MONOCYTES NFR BLD: 0.69 K/UL (ref 0.1–0.95)
MONOCYTES NFR BLD: 8 % (ref 2–12)
NEUTROPHILS NFR BLD: 73 % (ref 43–80)
NEUTS SEG NFR BLD: 6.43 K/UL (ref 1.8–7.3)
PHOSPHATE SERPL-MCNC: 2.7 MG/DL (ref 2.5–4.5)
PLATELET # BLD AUTO: 193 K/UL (ref 130–450)
PMV BLD AUTO: 10.6 FL (ref 7–12)
POTASSIUM SERPL-SCNC: 3.3 MMOL/L (ref 3.5–5)
RBC # BLD AUTO: 4.63 M/UL (ref 3.8–5.8)
RBC # BLD: ABNORMAL 10*6/UL
RBC # BLD: ABNORMAL 10*6/UL
SODIUM SERPL-SCNC: 140 MMOL/L (ref 132–146)
WBC OTHER # BLD: 8.8 K/UL (ref 4.5–11.5)

## 2025-02-24 PROCEDURE — 85025 COMPLETE CBC W/AUTO DIFF WBC: CPT

## 2025-02-24 PROCEDURE — 83735 ASSAY OF MAGNESIUM: CPT

## 2025-02-24 PROCEDURE — 6360000002 HC RX W HCPCS: Performed by: INTERNAL MEDICINE

## 2025-02-24 PROCEDURE — 80048 BASIC METABOLIC PNL TOTAL CA: CPT

## 2025-02-24 PROCEDURE — 6370000000 HC RX 637 (ALT 250 FOR IP): Performed by: INTERNAL MEDICINE

## 2025-02-24 PROCEDURE — 6370000000 HC RX 637 (ALT 250 FOR IP): Performed by: NURSE PRACTITIONER

## 2025-02-24 PROCEDURE — 84100 ASSAY OF PHOSPHORUS: CPT

## 2025-02-24 PROCEDURE — 94640 AIRWAY INHALATION TREATMENT: CPT

## 2025-02-24 PROCEDURE — 36415 COLL VENOUS BLD VENIPUNCTURE: CPT

## 2025-02-24 PROCEDURE — 6370000000 HC RX 637 (ALT 250 FOR IP): Performed by: HOSPITALIST

## 2025-02-24 PROCEDURE — 82962 GLUCOSE BLOOD TEST: CPT

## 2025-02-24 PROCEDURE — 2500000003 HC RX 250 WO HCPCS: Performed by: HOSPITALIST

## 2025-02-24 PROCEDURE — 83880 ASSAY OF NATRIURETIC PEPTIDE: CPT

## 2025-02-24 PROCEDURE — 99232 SBSQ HOSP IP/OBS MODERATE 35: CPT | Performed by: INTERNAL MEDICINE

## 2025-02-24 RX ORDER — FLUTICASONE PROPIONATE 50 MCG
1 SPRAY, SUSPENSION (ML) NASAL DAILY PRN
Qty: 16 G | Refills: 3 | Status: SHIPPED | OUTPATIENT
Start: 2025-02-24

## 2025-02-24 RX ORDER — FUROSEMIDE 40 MG/1
40 TABLET ORAL DAILY
Qty: 60 TABLET | Refills: 3 | Status: SHIPPED | OUTPATIENT
Start: 2025-02-25

## 2025-02-24 RX ORDER — ALBUTEROL SULFATE 90 UG/1
2 INHALANT RESPIRATORY (INHALATION) 4 TIMES DAILY PRN
Qty: 8 G | Refills: 0 | Status: SHIPPED | OUTPATIENT
Start: 2025-02-24

## 2025-02-24 RX ORDER — GUAIFENESIN 200 MG/10ML
200 LIQUID ORAL EVERY 4 HOURS PRN
Qty: 180 ML | Refills: 0 | Status: SHIPPED | OUTPATIENT
Start: 2025-02-24

## 2025-02-24 RX ORDER — FUROSEMIDE 40 MG/1
40 TABLET ORAL DAILY
Status: DISCONTINUED | OUTPATIENT
Start: 2025-02-25 | End: 2025-02-24 | Stop reason: HOSPADM

## 2025-02-24 RX ORDER — METOPROLOL SUCCINATE 25 MG/1
25 TABLET, EXTENDED RELEASE ORAL 2 TIMES DAILY
Qty: 30 TABLET | Refills: 3 | Status: SHIPPED | OUTPATIENT
Start: 2025-02-24 | End: 2025-02-28

## 2025-02-24 RX ADMIN — METOPROLOL SUCCINATE 25 MG: 25 TABLET, EXTENDED RELEASE ORAL at 08:04

## 2025-02-24 RX ADMIN — SODIUM CHLORIDE, PRESERVATIVE FREE 10 ML: 5 INJECTION INTRAVENOUS at 08:06

## 2025-02-24 RX ADMIN — GLIPIZIDE 5 MG: 5 TABLET ORAL at 06:32

## 2025-02-24 RX ADMIN — GUAIFENESIN 400 MG: 400 TABLET ORAL at 15:26

## 2025-02-24 RX ADMIN — POTASSIUM CHLORIDE 40 MEQ: 1500 TABLET, EXTENDED RELEASE ORAL at 10:10

## 2025-02-24 RX ADMIN — HYDRALAZINE HYDROCHLORIDE 100 MG: 50 TABLET ORAL at 08:04

## 2025-02-24 RX ADMIN — GUAIFENESIN 400 MG: 400 TABLET ORAL at 08:04

## 2025-02-24 RX ADMIN — IPRATROPIUM BROMIDE AND ALBUTEROL SULFATE 1 DOSE: 2.5; .5 SOLUTION RESPIRATORY (INHALATION) at 12:06

## 2025-02-24 RX ADMIN — HYDRALAZINE HYDROCHLORIDE 100 MG: 50 TABLET ORAL at 15:26

## 2025-02-24 RX ADMIN — IPRATROPIUM BROMIDE AND ALBUTEROL SULFATE 1 DOSE: 2.5; .5 SOLUTION RESPIRATORY (INHALATION) at 07:38

## 2025-02-24 RX ADMIN — SPIRONOLACTONE 25 MG: 25 TABLET ORAL at 08:04

## 2025-02-24 RX ADMIN — FUROSEMIDE 40 MG: 10 INJECTION, SOLUTION INTRAMUSCULAR; INTRAVENOUS at 08:05

## 2025-02-24 RX ADMIN — ROSUVASTATIN CALCIUM 10 MG: 10 TABLET, FILM COATED ORAL at 08:04

## 2025-02-24 RX ADMIN — ASPIRIN 81 MG: 81 TABLET, COATED ORAL at 08:04

## 2025-02-24 NOTE — DISCHARGE SUMMARY
Internal Medicine Discharge Summary    NAME: Maryjo Gomez Sr. :  1965  MRN:  [id number] PCP:Giuseppe Cote MD    ADMITTED: 2025   DISCHARGED: 25    ADMITTING PHYSICIAN: Oscar Chaparro MD    PCP: Giuseppe Cote MD    CONSULTANT(S):   IP CONSULT TO HOSPITALIST  IP CONSULT TO HEART FAILURE NURSE/COORDINATOR  IP CONSULT TO NEPHROLOGY  IP CONSULT TO HEART FAILURE NURSE/COORDINATOR     ADMITTING DIAGNOSIS:   LBBB (left bundle branch block) [I44.7]  Abnormal ECG [R94.31]  Abnormal EKG [R94.31]  Influenza with respiratory manifestation other than pneumonia [J11.1]  Acute on chronic systolic congestive heart failure (HCC) [I50.23]  Acute on chronic systolic CHF (congestive heart failure) (HCC) [I50.23]  Acute decompensated heart failure (HCC) [I50.9]     Please see H&P for further details    DISCHARGE DIAGNOSES:   Active Hospital Problems    Diagnosis     Influenza with respiratory manifestation other than pneumonia [J11.1]      Priority: Medium    Acute hypoxic respiratory failure (HCC) [J96.01]     Nonrheumatic mitral valve regurgitation [I34.0]     PAF (paroxysmal atrial fibrillation) (HCC) [I48.0]     LBBB (left bundle branch block) [I44.7]     Nonrheumatic tricuspid (valve) insufficiency [I36.1]     Acute on chronic systolic CHF (congestive heart failure) (HCC) [I50.23]     Essential hypertension [I10]     ICD (implantable cardioverter-defibrillator) malfunction, subsequent encounter [T82.118D]     Dilated cardiomyopathy (HCC) [I42.0]     S/P ICD (internal cardiac defibrillator) procedure [Z95.810]     Acute decompensated heart failure (HCC) [I50.9]        BRIEF HISTORY OF PRESENT ILLNESS: Maryjo Gomez Sr. is a 59 y.o. male patient of Giuseppe Cote MD who  has a past medical history of Cerebral artery occlusion with cerebral infarction (HCC), CHF (congestive heart failure) (HCC), Colon cancer screening, Dilated cardiomyopathy (HCC), Hypertension, Nonrheumatic mitral

## 2025-02-24 NOTE — CARE COORDINATION
Social work / Discharge planning:          Patient is hopeful to discharge today.    Per IDR, he is requesting nebulizer for discharge.    No preference for agency to provide DME.    Will need DME order if physician wants patient to have nebulizer for discharge.   Electronically signed by LADI Guerin on 2/24/2025 at 10:42 AM

## 2025-02-24 NOTE — PROGRESS NOTES
INPATIENT CARDIOLOGY FOLLOW-UP    Name: Maryjo Gomez .    Age: 59 y.o.    Date of Service: 2/20/2025    Chief Complaint: Follow-up for acute on chronic HFrEF, VHD, influenza A    Interim History:  Admitted for further evaluation of cough/URI symptoms (+influenza A infection). Currently with no chest pain, respiratory distress, or palpitations at rest (still on O2). SR on EKG and telemetry. No new overnight cardiac complaints.    Review of Systems:   Cardiac: As per HPI  General: No fever, chills  Pulmonary: As per HPI  HEENT: No visual disturbances, difficult swallowing  GI: No nausea, vomiting  : No dysuria, hematuria  Endocrine: No thyroid disease, +DM  Musculoskeletal: PELAEZ x 4, no focal motor deficits  Skin: Intact, no rashes  Neuro: No headache, seizures  Psych: Currently with no depression, anxiety    Problem List:  Patient Active Problem List   Diagnosis    CHF (congestive heart failure) ([facility])    Chest pain    S/P ICD (internal cardiac defibrillator) procedure    Dilated cardiomyopathy ([facility])    ICD (implantable cardioverter-defibrillator) malfunction, subsequent encounter    Essential hypertension    Dyspnea    Wheezing    Fever    Acute on chronic systolic CHF (congestive heart failure) ([facility])    Nonrheumatic tricuspid (valve) insufficiency    Influenza with respiratory manifestation other than pneumonia    Acute hypoxic respiratory failure ([facility])    Nonrheumatic mitral valve regurgitation    PAF (paroxysmal atrial fibrillation) ([facility])    LBBB (left bundle branch block)       Allergies:  No Known Allergies    Current Medications:  Current Facility-Administered Medications   Medication Dose Route Frequency Provider Last Rate Last Admin    guaiFENesin tablet 400 mg  400 mg Oral TID Leticia Rodney APRN - CNP   400 mg at 02/20/25 1312    ipratropium 0.5 mg-albuterol 2.5 mg (DUONEB) nebulizer solution 1 Dose  1 Dose Inhalation Q4H WA RT Leticia Rodney APRN - CNP   1 Dose at 02/20/25 1204    melatonin 
    INPATIENT CARDIOLOGY FOLLOW-UP    Name: Maryjo Gomez Sr.    Age: 59 y.o.    Date of Service: 2/22/2025    Chief Complaint: Follow-up for acute on chronic HFrEF, VHD, influenza A    Interim History:  Admitted for further evaluation of cough/URI symptoms (+influenza A infection). Currently with no chest pain, respiratory distress, or palpitations at rest (still on O2). SR on EKG. Telemetry showing SR with episodes of NSVT up to 15 beats. No new overnight cardiac complaints.    Review of Systems:   Cardiac: As per HPI  General: No fever, chills  Pulmonary: As per HPI  HEENT: No visual disturbances, difficult swallowing  GI: No nausea, vomiting  : No dysuria, hematuria  Endocrine: No thyroid disease, +DM  Musculoskeletal: PELAEZ x 4, no focal motor deficits  Skin: Intact, no rashes  Neuro: No headache, seizures  Psych: Currently with no depression, anxiety    Problem List:  Patient Active Problem List   Diagnosis    Acute decompensated heart failure (HCC)    Chest pain    S/P ICD (internal cardiac defibrillator) procedure    Dilated cardiomyopathy (HCC)    ICD (implantable cardioverter-defibrillator) malfunction, subsequent encounter    Essential hypertension    Dyspnea    Wheezing    Fever    Acute on chronic systolic CHF (congestive heart failure) ([facility])    Nonrheumatic tricuspid (valve) insufficiency    Influenza with respiratory manifestation other than pneumonia    Acute hypoxic respiratory failure (HCC)    Nonrheumatic mitral valve regurgitation    PAF (paroxysmal atrial fibrillation) ([facility])    LBBB (left bundle branch block)       Allergies:  No Known Allergies    Current Medications:  Current Facility-Administered Medications   Medication Dose Route Frequency Provider Last Rate Last Admin    metoprolol succinate (TOPROL XL) extended release tablet 25 mg  25 mg Oral BID Leticia Mensah APRN - CNP   25 mg at 02/22/25 0849    guaiFENesin tablet 400 mg  400 mg Oral TID Leticia Rodney APRN - CNP   400 mg at 
    INPATIENT CARDIOLOGY FOLLOW-UP    Name: Maryjo Gomez Sr.    Age: 59 y.o.    Date of Service: 2/23/2025    Chief Complaint: Follow-up for acute on chronic HFrEF, VHD, influenza A    Interim History:  Admitted for further evaluation of cough/URI symptoms (+influenza A infection). Currently with no chest pain, respiratory distress, or palpitations at rest (still on O2). SR on EKG. Telemetry showing SR with episodes of NSVT up to 15 beats. No new overnight cardiac complaints.    Review of Systems:   Cardiac: As per HPI  General: No fever, chills  Pulmonary: As per HPI  HEENT: No visual disturbances, difficult swallowing  GI: No nausea, vomiting  : No dysuria, hematuria  Endocrine: No thyroid disease, +DM  Musculoskeletal: PELAEZ x 4, no focal motor deficits  Skin: Intact, no rashes  Neuro: No headache, seizures  Psych: Currently with no depression, anxiety    Problem List:  Patient Active Problem List   Diagnosis    Acute decompensated heart failure (HCC)    Chest pain    S/P ICD (internal cardiac defibrillator) procedure    Dilated cardiomyopathy (HCC)    ICD (implantable cardioverter-defibrillator) malfunction, subsequent encounter    Essential hypertension    Dyspnea    Wheezing    Fever    Acute on chronic systolic CHF (congestive heart failure) ([facility])    Nonrheumatic tricuspid (valve) insufficiency    Influenza with respiratory manifestation other than pneumonia    Acute hypoxic respiratory failure (HCC)    Nonrheumatic mitral valve regurgitation    PAF (paroxysmal atrial fibrillation) ([facility])    LBBB (left bundle branch block)       Allergies:  No Known Allergies    Current Medications:  Current Facility-Administered Medications   Medication Dose Route Frequency Provider Last Rate Last Admin    potassium chloride (KLOR-CON M) extended release tablet 40 mEq  40 mEq Oral Once Mago Bailey, APRN - CNP        metoprolol succinate (TOPROL XL) extended release tablet 25 mg  25 mg Oral BID Leticia Mensah, APRN - 
    INPATIENT CARDIOLOGY FOLLOW-UP    Name: Maryjo Gomez SrGeorge    Age: 59 y.o.    Date of Service: 2/24/2025    Chief Complaint: Follow-up for acute on chronic HFrEF, VHD, influenza A    Interim History:  Admitted for further evaluation of cough/URI symptoms (+influenza A infection).     Review of Systems:   Cardiac: As per HPI  General: No fever, chills  Pulmonary: As per HPI  HEENT: No visual disturbances, difficult swallowing  GI: No nausea, vomiting  : No dysuria, hematuria  Endocrine: No thyroid disease, +DM  Musculoskeletal: PELAEZ x 4, no focal motor deficits  Skin: Intact, no rashes  Neuro: No headache, seizures  Psych: Currently with no depression, anxiety    Problem List:  Patient Active Problem List   Diagnosis    Acute decompensated heart failure (HCC)    Chest pain    S/P ICD (internal cardiac defibrillator) procedure    Dilated cardiomyopathy (HCC)    ICD (implantable cardioverter-defibrillator) malfunction, subsequent encounter    Essential hypertension    Dyspnea    Wheezing    Fever    Acute on chronic systolic CHF (congestive heart failure) ([facility])    Nonrheumatic tricuspid (valve) insufficiency    Influenza with respiratory manifestation other than pneumonia    Acute hypoxic respiratory failure ([facility])    Nonrheumatic mitral valve regurgitation    PAF (paroxysmal atrial fibrillation) ([facility])    LBBB (left bundle branch block)       Allergies:  No Known Allergies    Current Medications:  Current Facility-Administered Medications   Medication Dose Route Frequency Provider Last Rate Last Admin    potassium chloride (KLOR-CON M) extended release tablet 40 mEq  40 mEq Oral Once Mago Bailey APRN - CNP        metoprolol succinate (TOPROL XL) extended release tablet 25 mg  25 mg Oral BID Leticia Mensah APRN - CNP   25 mg at 02/23/25 2025    guaiFENesin tablet 400 mg  400 mg Oral TID Leticia Rodney APRN - CNP   400 mg at 02/23/25 2025    ipratropium 0.5 mg-albuterol 2.5 mg (DUONEB) nebulizer solution 
 Nephrology progress note      Reason for Consult: avril  Date of Service: 2/21/2025   Chief Complaint: had concerns including Shortness of Breath (Hx CHF, concern for Covid ).  History Obtained From: patient electronic medical record       HISTORY OF PRESENT ILLNESS:     Maryjo Gomez Sr. is a 59 y.o. male with a past medical history of paroxysmal A-fib, hypertension, heart failure, CAD.     They presented on 2/18/2025 complaining of shortness of breath.  He complains of worsening shortness of breath that started the morning of presentation.  In addition he complains of generalized weakness and fatigue for approximately 3 days prior to presentation.  He denies other symptoms.  On arrival to the emergency department he was hypoxic requiring 3 L nasal cannula with the rest of the vitals stable.  He did develop fever on 2/19/2025. Nephrology was consulted for AVRIL.     Subjective  2/21/2025: Did develop NSVT overnight.  No complaints from the patient.  No new symptoms today.      Past Medical History:   Past Medical History:   Diagnosis Date    Cerebral artery occlusion with cerebral infarction (HCC) 2017    no residual    CHF (congestive heart failure) (HCC)     Colon cancer screening     Dilated cardiomyopathy (HCC)     Follows with Dr. Sethi-last OV     Hypertension     Nonrheumatic mitral (valve) insufficiency     Nonrheumatic tricuspid (valve) insufficiency        Past Surgical History:   Past Surgical History:   Procedure Laterality Date    CARDIAC DEFIBRILLATOR PLACEMENT Left 10/10/2017    [employer] Dual ICD,     COLONOSCOPY N/A 1/20/2021    COLONOSCOPY WITH BIOPSY performed by Marlon Waters MD at [facility] ENDOSCOPY    PACEMAKER PLACEMENT      TONSILLECTOMY         Medications Prior to Admission:   Medications Prior to Admission: rosuvastatin (CRESTOR) 10 MG tablet, Take 1 tablet by mouth daily  aspirin 81 MG EC tablet, Take 1 tablet by mouth daily  sacubitril-valsartan (ENTRESTO) 49-51 MG per 
 Nephrology progress note      Reason for Consult: avril  Date of Service: 2/23/2025   Chief Complaint: had concerns including Shortness of Breath (Hx CHF, concern for Covid ).  History Obtained From: patient electronic medical record       HISTORY OF PRESENT ILLNESS:     Maryjo Gomez Sr. is a 59 y.o. male with a past medical history of paroxysmal A-fib, hypertension, heart failure, CAD.     They presented on 2/18/2025 complaining of shortness of breath.  He complains of worsening shortness of breath that started the morning of presentation.  In addition he complains of generalized weakness and fatigue for approximately 3 days prior to presentation.  He denies other symptoms.  On arrival to the emergency department he was hypoxic requiring 3 L nasal cannula with the rest of the vitals stable.  He did develop fever on 2/19/2025. Nephrology was consulted for AVRIL.     Subjective  2/23/2025: Patient seen and examined.  He was sitting in a chair at the time of my visit.  States he is doing so much better with breathing and he did sleep last night after he was given medication.    Past Medical History:   Past Medical History:   Diagnosis Date    Cerebral artery occlusion with cerebral infarction (HCC) 2017    no residual    CHF (congestive heart failure) (HCC)     Colon cancer screening     Dilated cardiomyopathy (HCC)     Follows with Dr. Sethi-amaris OV     Hypertension     Nonrheumatic mitral (valve) insufficiency     Nonrheumatic tricuspid (valve) insufficiency        Past Surgical History:   Past Surgical History:   Procedure Laterality Date    CARDIAC DEFIBRILLATOR PLACEMENT Left 10/10/2017    [employer]tronic Dual ICD,     COLONOSCOPY N/A 1/20/2021    COLONOSCOPY WITH BIOPSY performed by Marlon Waters MD at [facility] ENDOSCOPY    PACEMAKER PLACEMENT      TONSILLECTOMY         Medications Prior to Admission:   Medications Prior to Admission: rosuvastatin (CRESTOR) 10 MG tablet, Take 1 tablet by mouth 
4 Eyes Skin Assessment     NAME:  Maryjo Gomez .  YOB: 1965  MEDICAL RECORD NUMBER:  [id number]    The patient is being assessed for  Admission    I agree that at least one RN has performed a thorough Head to Toe Skin Assessment on the patient. ALL assessment sites listed below have been assessed.      Areas assessed by both nurses:    Head, Face, Ears, Shoulders, Back, Chest, Arms, Elbows, Hands, Sacrum. Buttock, Coccyx, Ischium, and Legs. Feet and Heels        Does the Patient have a Wound? No noted wound(s)       Wilfredo Prevention initiated by RN: No  Wound Care Orders initiated by RN: No    Pressure Injury (Stage 3,4, Unstageable, DTI, NWPT, and Complex wounds) if present, place Wound referral order by RN under : No    New Ostomies, if present place, Ostomy referral order under : No     Nurse 1 eSignature: Electronically signed by Janae Martinez RN on 2/18/25 at 10:28 PM EST    **SHARE this note so that the co-signing nurse can place an eSignature**    Nurse 2 eSignature: {Esignature:[id number]}   
Blood sugar 53.  Pt given OJ, peanut butter, and minerva crackers.  
CLINICAL PHARMACY NOTE: MEDS TO BEDS    Total # of Prescriptions Filled: 6   The following medications were delivered to the patient:  Furosemide 40 mg   Fluticasone   Albuterol HFA   Xarelto 20 mg   Jair-tussin   Metoprolol ER 25 mg     Additional Documentation:    
Database initiated pharmacy and medications verified with the patient. He is A&O comes in from home with a friend. He is having diarrhea.   
Internal Medicine Progress Note    Patient's name: Maryjo Gomez Sr.  : 1965  Chief complaints (on day of admission): Shortness of Breath (Hx CHF, concern for Covid )  Admission date: 2025  Date of service: 2025   Room: [address] SURG  Primary care physician: Giuseppe Cote MD  Reason for visit: Follow-up for influenza A, CHF exacerbation, hypoxia    Subjective  Maryjo is seen lying in bed asleep in no distress. He does easily awaken to voice. He reports ongoing cough with some chest congestion. Breathing is a little better aside from the congestion. He denies any fever or chills. Denies any nausea or vomiting. Diuretics and Entresto were placed on hold yesterday due to worsening renal function; Nephrology consult is pending. No other issues or concerns from nursing.    Review of Systems  Full 10 point review of systems negative unless mentioned above.    Hospital Medications  Current Facility-Administered Medications   Medication Dose Route Frequency Provider Last Rate Last Admin    melatonin tablet 3 mg  3 mg Oral Nightly PRN Marlon Che, DO        glipiZIDE (GLUCOTROL) tablet 5 mg  5 mg Oral QAM AC Marlon Che, DO   5 mg at 25 0637    fluticasone (FLONASE) 50 MCG/ACT nasal spray 1 spray  1 spray Each Nostril Daily PRN Marlon Che DO        guaiFENesin (ROBITUSSIN) 100 MG/5ML liquid 200 mg  200 mg Oral Q4H PRN Adele Cole APRN - CNP   200 mg at 25 0442    [Held by provider] spironolactone (ALDACTONE) tablet 25 mg  25 mg Oral Daily Oscar Chaparro MD   25 mg at 25 0954    carvedilol (COREG) tablet 25 mg  25 mg Oral BID WC Oscar Chaparro MD   25 mg at 25 1652    hydrALAZINE (APRESOLINE) tablet 100 mg  100 mg Oral TID Oscar Chaparro MD   100 mg at 25    perflutren lipid microspheres (DEFINITY) injection 1.5 mL  1.5 mL IntraVENous ONCE PRN Oscar Chaparro MD        rivaroxaban (XARELTO) tablet 15 mg  15 mg Oral Daily with breakfast Shankar 
Nephrology Progress Note  2/22/2025 4:35 PM  Subjective:   Admit Date: 2/18/2025  PCP: Giuseppe Cote MD    Interval History: Seen in his room at 12:33 PM.  Complaining of inability to sleep for 2 days I ordered 1 dose of Benadryl 50 mg.  Still with shortness of breath.  I resumed Lasix.    Diet: ADULT DIET; Regular; No Added Salt (3-4 gm); No Pork    Data:     Scheduled Meds:   metoprolol succinate  25 mg Oral BID    guaiFENesin  400 mg Oral TID    ipratropium 0.5 mg-albuterol 2.5 mg  1 Dose Inhalation Q4H WA RT    glipiZIDE  5 mg Oral QAM AC    [Held by provider] spironolactone  25 mg Oral Daily    hydrALAZINE  100 mg Oral TID    rivaroxaban  15 mg Oral Daily with breakfast    [Held by provider] sacubitril-valsartan  1 tablet Oral BID    rosuvastatin  10 mg Oral Daily    aspirin  81 mg Oral Daily    sodium chloride flush  5-40 mL IntraVENous 2 times per day    [Held by provider] furosemide  40 mg IntraVENous BID    insulin lispro  0-8 Units SubCUTAneous 4x Daily AC & HS    oseltamivir  30 mg Oral BID     Continuous Infusions:   sodium chloride      dextrose       PRN Meds:melatonin, fluticasone, guaiFENesin, perflutren lipid microspheres, sodium chloride flush, sodium chloride, polyethylene glycol, acetaminophen **OR** acetaminophen, potassium chloride **OR** potassium alternative oral replacement **OR** potassium chloride, magnesium sulfate, sulfur hexafluoride microspheres, glucose, dextrose bolus **OR** dextrose bolus, glucagon (rDNA), dextrose, prochlorperazine **OR** prochlorperazine  I/O last 3 completed shifts:  In: -   Out: 915 [Urine:915]  No intake/output data recorded.  No intake or output data in the 24 hours ending 02/22/25 1635  CBC:   Recent Labs     02/20/25  0813 02/21/25 0337 02/22/25  0140   WBC 7.8 6.3 5.8   HGB 12.8 12.6 12.9   PLT  --   --  157     BMP:    Recent Labs     02/20/25  0813 02/21/25  0337 02/22/25  0140    134 136   K 3.7 3.8 3.8   CL 97* 99 99   CO2 23 24 26 
New consult sent to Dr Carson via [employer] message.  
Patient is refusing repeat lab draws and states \"Can't you just take it out of my IV?\" Education on the importance provided.   
Patient with complaints of no sleep and no appetite X$ days. States \"I just want to go home.\" Requesting nebulizer for home at [place].      Electronically signed by Nika Trent RN on 2/24/2025 at 8:15 AM    
Perfect Serve to cardiology and Nephrology with the following message: BNP went from 7000 to 34,000 since 2/22     Electronically signed by Nika Trent RN on 2/24/2025 at 10:04 AM    
Spiritual Health History and Assessment/Progress Note  [facility]    Attempted Encounter,  ,  ,      Name: Maryjo Gomez Sr. MRN: [id number]    Age: 59 y.o.     Sex: male   Language: English   Oriental orthodox: Latter-day   Acute on chronic systolic CHF (congestive heart failure) ([facility])     Date: 2/20/2025                           Spiritual Assessment began in [address] MED SURG        Referral/Consult From: Rounding   Encounter Overview/Reason: Attempted Encounter  Service Provided For: Patient, Patient not available    Mary Jo, Belief, Meaning:   Patient unable to assess at this time  Family/Friends No family/friends present      Importance and Influence:  Patient unable to assess at this time  Family/Friends No family/friends present    Community:  Patient feels well-supported. Support system includes: Spouse/Partner  Family/Friends No family/friends present    Assessment and Plan of Care:     Patient Interventions include: Other: Unable to assess, doctor addressing the patient while rounding  Family/Friends Interventions include: No family/friends present    Patient Plan of Care: Spiritual Care available upon further referral  Family/Friends Plan of Care: No family/friends present    Electronically signed by Chaplain Richard on 2/20/2025 at 3:22 PM    
Oral BID WC Oscar Chaparro MD   25 mg at 02/20/25 1657    hydrALAZINE (APRESOLINE) tablet 100 mg  100 mg Oral TID Oscar Chaparro MD   100 mg at 02/20/25 2026    perflutren lipid microspheres (DEFINITY) injection 1.5 mL  1.5 mL IntraVENous ONCE PRN Oscar Chaparro MD        rivaroxaban (XARELTO) tablet 15 mg  15 mg Oral Daily with breakfast Oscar Chaparro MD   15 mg at 02/20/25 0946    [Held by provider] sacubitril-valsartan (ENTRESTO) 49-51 MG per tablet 1 tablet  1 tablet Oral BID Oscar Chaparor MD   1 tablet at 02/19/25 0822    rosuvastatin (CRESTOR) tablet 10 mg  10 mg Oral Daily Oscar Chaparro MD   10 mg at 02/20/25 0945    aspirin EC tablet 81 mg  81 mg Oral Daily Oscar Chaparro MD   81 mg at 02/20/25 0946    sodium chloride flush 0.9 % injection 5-40 mL  5-40 mL IntraVENous 2 times per day Oscar Chaparro MD   10 mL at 02/20/25 2026    sodium chloride flush 0.9 % injection 5-40 mL  5-40 mL IntraVENous PRN Oscar Chaparro MD        0.9 % sodium chloride infusion   IntraVENous PRN Oscar Chaparro MD        polyethylene glycol (GLYCOLAX) packet 17 g  17 g Oral Daily PRN Oscar Chaparro MD        acetaminophen (TYLENOL) tablet 650 mg  650 mg Oral Q6H PRN Oscar Chaparro MD   650 mg at 02/19/25 2037    Or    acetaminophen (TYLENOL) suppository 650 mg  650 mg Rectal Q6H PRN Oscar Chaparro MD        potassium chloride (KLOR-CON M) extended release tablet 40 mEq  40 mEq Oral PRN Oscar Chaparro MD        Or    potassium bicarb-citric acid (EFFER-K) effervescent tablet 40 mEq  40 mEq Oral PRN Oscar Chaparro MD        Or    potassium chloride 10 mEq/100 mL IVPB (Peripheral Line)  10 mEq IntraVENous PRN Oscar Chaparro MD        magnesium sulfate 2000 mg in 50 mL IVPB premix  2,000 mg IntraVENous PRN Oscar Chaparro MD        sulfur hexafluoride microspheres (LUMASON) 60.7-25 MG injection 2 mL  2 mL IntraVENous ONCE PRN Oscar Chaparro MD        [Held by provider] furosemide (LASIX) injection 40 mg  40 mg IntraVENous BID Oscar Chaparro MD   40 mg at 
PRN Marlon Che DO        guaiFENesin (ROBITUSSIN) 100 MG/5ML liquid 200 mg  200 mg Oral Q4H PRN Adele Cole APRN - CNP   200 mg at 02/23/25 0023    [Held by provider] spironolactone (ALDACTONE) tablet 25 mg  25 mg Oral Daily Oscar Chaparro MD   25 mg at 02/19/25 0954    hydrALAZINE (APRESOLINE) tablet 100 mg  100 mg Oral TID Oscar Chaparro MD   100 mg at 02/22/25 2019    perflutren lipid microspheres (DEFINITY) injection 1.5 mL  1.5 mL IntraVENous ONCE PRN Oscar Chaparro MD        rivaroxaban (XARELTO) tablet 15 mg  15 mg Oral Daily with breakfast Oscar Chaparro MD   15 mg at 02/22/25 0850    [Held by provider] sacubitril-valsartan (ENTRESTO) 49-51 MG per tablet 1 tablet  1 tablet Oral BID Oscar Chaparro MD   1 tablet at 02/19/25 0822    rosuvastatin (CRESTOR) tablet 10 mg  10 mg Oral Daily Oscar Chaparro MD   10 mg at 02/22/25 0849    aspirin EC tablet 81 mg  81 mg Oral Daily Oscar Chaparro MD   81 mg at 02/22/25 0850    sodium chloride flush 0.9 % injection 5-40 mL  5-40 mL IntraVENous 2 times per day Oscar Chaparro MD   10 mL at 02/22/25 2020    sodium chloride flush 0.9 % injection 5-40 mL  5-40 mL IntraVENous PRN Oscar Chaparro MD        0.9 % sodium chloride infusion   IntraVENous PRN Oscar Chaparro MD        polyethylene glycol (GLYCOLAX) packet 17 g  17 g Oral Daily PRN Oscar Chaparro MD        acetaminophen (TYLENOL) tablet 650 mg  650 mg Oral Q6H PRN Oscar Chaparro MD   650 mg at 02/19/25 2037    Or    acetaminophen (TYLENOL) suppository 650 mg  650 mg Rectal Q6H PRN Oscar Chaparro MD        potassium chloride (KLOR-CON M) extended release tablet 40 mEq  40 mEq Oral PRN Oscar Chaparro MD        Or    potassium bicarb-citric acid (EFFER-K) effervescent tablet 40 mEq  40 mEq Oral PRN Oscar Chaparro MD        Or    potassium chloride 10 mEq/100 mL IVPB (Peripheral Line)  10 mEq IntraVENous PRN Oscar Chaparro MD        magnesium sulfate 2000 mg in 50 mL IVPB premix  2,000 mg IntraVENous PRN Oscar Chaparro MD        
prophylaxis  Please see orders for further management and care.  Discharge plan: home when renal function and respiratory status stable -- hopefully 1-2 days    The pertinent details of this case were discussed with Dr. Chaparro    Electronically signed by ROMY Ponce CNP on 2/22/2025 at 9:04 AM

## 2025-02-28 ENCOUNTER — HOSPITAL ENCOUNTER (EMERGENCY)
Age: 60
Discharge: HOME | End: 2025-02-28
Attending: EMERGENCY MEDICINE
Payer: MEDICARE

## 2025-02-28 ENCOUNTER — OFFICE VISIT (OUTPATIENT)
Dept: CARDIOLOGY CLINIC | Age: 60
End: 2025-02-28
Payer: MEDICARE

## 2025-02-28 VITALS
SYSTOLIC BLOOD PRESSURE: 124 MMHG | HEART RATE: 78 BPM | DIASTOLIC BLOOD PRESSURE: 81 MMHG | WEIGHT: 200 LBS | BODY MASS INDEX: 27.12 KG/M2 | RESPIRATION RATE: 18 BRPM | OXYGEN SATURATION: 98 % | TEMPERATURE: 98.2 F

## 2025-02-28 VITALS
WEIGHT: 191 LBS | HEIGHT: 72 IN | BODY MASS INDEX: 25.87 KG/M2 | RESPIRATION RATE: 20 BRPM | HEART RATE: 114 BPM | SYSTOLIC BLOOD PRESSURE: 130 MMHG | DIASTOLIC BLOOD PRESSURE: 70 MMHG

## 2025-02-28 DIAGNOSIS — S39.012A STRAIN OF LUMBAR REGION, INITIAL ENCOUNTER: Primary | ICD-10-CM

## 2025-02-28 DIAGNOSIS — I50.9 CONGESTIVE HEART FAILURE, UNSPECIFIED HF CHRONICITY, UNSPECIFIED HEART FAILURE TYPE (HCC): Primary | ICD-10-CM

## 2025-02-28 LAB
BILIRUB UR QL STRIP: NEGATIVE
CLARITY UR: CLEAR
COLOR UR: YELLOW
GLUCOSE UR STRIP-MCNC: NEGATIVE MG/DL
HGB UR QL STRIP.AUTO: NEGATIVE
KETONES UR STRIP-MCNC: ABNORMAL MG/DL
LEUKOCYTE ESTERASE UR QL STRIP: NEGATIVE
NITRITE UR QL STRIP: NEGATIVE
PH UR STRIP: 6 [PH] (ref 5–8)
PROT UR STRIP-MCNC: ABNORMAL MG/DL
RBC #/AREA URNS HPF: ABNORMAL /HPF
SP GR UR STRIP: 1.01 (ref 1–1.03)
UROBILINOGEN UR STRIP-ACNC: 4 EU/DL (ref 0–1)
WBC #/AREA URNS HPF: ABNORMAL /HPF

## 2025-02-28 PROCEDURE — 93000 ELECTROCARDIOGRAM COMPLETE: CPT | Performed by: INTERNAL MEDICINE

## 2025-02-28 PROCEDURE — 6370000000 HC RX 637 (ALT 250 FOR IP): Performed by: EMERGENCY MEDICINE

## 2025-02-28 PROCEDURE — 99283 EMERGENCY DEPT VISIT LOW MDM: CPT

## 2025-02-28 PROCEDURE — 1111F DSCHRG MED/CURRENT MED MERGE: CPT | Performed by: INTERNAL MEDICINE

## 2025-02-28 PROCEDURE — 3017F COLORECTAL CA SCREEN DOC REV: CPT | Performed by: INTERNAL MEDICINE

## 2025-02-28 PROCEDURE — 81001 URINALYSIS AUTO W/SCOPE: CPT

## 2025-02-28 PROCEDURE — 3075F SYST BP GE 130 - 139MM HG: CPT | Performed by: INTERNAL MEDICINE

## 2025-02-28 PROCEDURE — G8417 CALC BMI ABV UP PARAM F/U: HCPCS | Performed by: INTERNAL MEDICINE

## 2025-02-28 PROCEDURE — 1036F TOBACCO NON-USER: CPT | Performed by: INTERNAL MEDICINE

## 2025-02-28 PROCEDURE — 99214 OFFICE O/P EST MOD 30 MIN: CPT | Performed by: INTERNAL MEDICINE

## 2025-02-28 PROCEDURE — 3078F DIAST BP <80 MM HG: CPT | Performed by: INTERNAL MEDICINE

## 2025-02-28 PROCEDURE — G8427 DOCREV CUR MEDS BY ELIG CLIN: HCPCS | Performed by: INTERNAL MEDICINE

## 2025-02-28 RX ORDER — METOPROLOL SUCCINATE 50 MG/1
50 TABLET, EXTENDED RELEASE ORAL 2 TIMES DAILY
Qty: 180 TABLET | Refills: 2 | Status: SHIPPED | OUTPATIENT
Start: 2025-02-28

## 2025-02-28 RX ORDER — CYCLOBENZAPRINE HCL 5 MG
5 TABLET ORAL 2 TIMES DAILY PRN
Qty: 10 TABLET | Refills: 0 | Status: SHIPPED | OUTPATIENT
Start: 2025-02-28 | End: 2025-03-10

## 2025-02-28 RX ORDER — CYCLOBENZAPRINE HCL 10 MG
10 TABLET ORAL ONCE
Status: COMPLETED | OUTPATIENT
Start: 2025-02-28 | End: 2025-02-28

## 2025-02-28 RX ADMIN — CYCLOBENZAPRINE 10 MG: 10 TABLET, FILM COATED ORAL at 18:10

## 2025-02-28 NOTE — DISCHARGE INSTRUCTIONS
Please follow-up with primary care physician, to the emergency department for any worsening symptoms or concerns

## 2025-02-28 NOTE — PROGRESS NOTES
Cardiology Progress Note  Dr. Lula Sethi      Referring Physician: Dr. Giuseppe Cote  CHIEF COMPLAINT:   Chief Complaint   Patient presents with    Congestive Heart Failure     Hosp. Follow-up.. pt has no c/c     HISTORY OF PRESENT ILLNESS:   Patient is a 59 year old male with significant past medical history of nonischemic cardiomyopathy, AICD in situ, hypertension, diabetes mellitus, is here for follow-up appointment.  Was just admitted to the hospital with flu  Patient denies any chest pain, no shortness of breath, no lightheadedness, no dizziness, no palpitations, no pedal edema, no PND, no orthopnea, no syncope, no presyncopal episodes.  Functional capacity is at baseline    Past Medical History:   Diagnosis Date    Cerebral artery occlusion with cerebral infarction (HCC) 2017    no residual    CHF (congestive heart failure) (HCC)     Colon cancer screening     Dilated cardiomyopathy (HCC)     Follows with Dr. Sethi-last OV     Hypertension     Nonrheumatic mitral (valve) insufficiency     Nonrheumatic tricuspid (valve) insufficiency          Past Surgical History:   Procedure Laterality Date    CARDIAC DEFIBRILLATOR PLACEMENT Left 10/10/2017    Medtronic Dual ICD,     COLONOSCOPY N/A 1/20/2021    COLONOSCOPY WITH BIOPSY performed by Marlon Waters MD at [facility] ENDOSCOPY    PACEMAKER PLACEMENT      TONSILLECTOMY           Current Outpatient Medications   Medication Sig Dispense Refill    furosemide (LASIX) 40 MG tablet Take 1 tablet by mouth daily 60 tablet 3    rivaroxaban (XARELTO) 20 MG TABS tablet Take 1 tablet by mouth Daily with supper 30 tablet 0    fluticasone (FLONASE) 50 MCG/ACT nasal spray 1 spray by Each Nostril route daily as needed for Rhinitis 16 g 3    guaiFENesin (ROBITUSSIN) 100 MG/5ML liquid Take 10 mLs by mouth every 4 hours as needed for Cough 180 mL 0    metoprolol succinate (TOPROL XL) 25 MG extended release tablet Take 1 tablet by mouth in the morning and at

## 2025-02-28 NOTE — ED PROVIDER NOTES
[facility] EMERGENCY DEPARTMENT  EMERGENCY DEPARTMENT ENCOUNTER        Pt Name: Maryjo Gomez Sr.  MRN: [id number]  Birthdate 1965  Date of evaluation: 2/28/2025  Provider: Bimal Toth DO  PCP: Giuseppe Cote MD  Note Started: 5:48 PM EST 2/28/25    CHIEF COMPLAINT       Chief Complaint   Patient presents with    Back Pain     Left lower back pain        HISTORY OF PRESENT ILLNESS: 1 or more Elements   History From: Patient    Maryjo Gomez Sr. is a 59 y.o. male with a PMHx of heart failure, ICD, hypertension, A-fib, left bundle branch block, who presents with back pain.  Patient was on his way to his primary care physician today when he had some left lower back pain mainly located in the lumbar region, not flank pain despite triage note.  Pain worsens with movement.  He denies any fevers, chills, chest pain, shortness of breath, abdominal pain, nausea, vomiting, diarrhea, constipation, dysuria, hematuria, hematochezia.  Patient states he is a never smoker, not currently drinking alcohol, and admits to marijuana use.  He denies any lower extremity numbness or tingling or weakness, no fevers, no saddle anesthesia, no urinary incontinence or retention, no bowel incontinence or retention.      Nursing Notes were all reviewed and agreed with or any disagreements were addressed in the HPI.      REVIEW OF EXTERNAL NOTES :       PDMP Monitoring:    Last PDMP Bimal as Reviewed:  Review User Review Instant Review Result   JHOAN BLACK 8/10/2024  6:09 PM Unable to Review [2]       Urine Drug Screenings (1 yr)    No resulted procedures found.       Medication Contract and Consent for Opioid Use Documents Filed        No documents found                      REVIEW OF SYSTEMS :      Positives and Pertinent negatives as per HPI.     SURGICAL HISTORY     Past Surgical History:   Procedure Laterality Date    CARDIAC DEFIBRILLATOR PLACEMENT Left 10/10/2017    Medtronic Dual ICD,

## 2025-03-05 ENCOUNTER — HOSPITAL ENCOUNTER (OUTPATIENT)
Age: 60
Discharge: HOME | End: 2025-03-05
Payer: MEDICARE

## 2025-03-05 LAB
ALBUMIN SERPL-MCNC: 3.8 G/DL (ref 3.5–5.2)
ALP SERPL-CCNC: 59 U/L (ref 40–129)
ALT SERPL-CCNC: 20 U/L (ref 0–40)
ANION GAP SERPL CALCULATED.3IONS-SCNC: 13 MMOL/L (ref 7–16)
AST SERPL-CCNC: 22 U/L (ref 0–39)
BACTERIA URNS QL MICRO: ABNORMAL
BASOPHILS # BLD: 0.06 K/UL (ref 0–0.2)
BASOPHILS NFR BLD: 1 % (ref 0–2)
BILIRUB SERPL-MCNC: 0.7 MG/DL (ref 0–1.2)
BILIRUB UR QL STRIP: NEGATIVE
BUN SERPL-MCNC: 18 MG/DL (ref 6–20)
CALCIUM SERPL-MCNC: 9.2 MG/DL (ref 8.6–10.2)
CHLORIDE SERPL-SCNC: 104 MMOL/L (ref 98–107)
CLARITY UR: CLEAR
CO2 SERPL-SCNC: 27 MMOL/L (ref 22–29)
COLOR UR: YELLOW
CREAT SERPL-MCNC: 1.6 MG/DL (ref 0.7–1.2)
CREAT UR-MCNC: 255.2 MG/DL (ref 40–278)
EOSINOPHIL # BLD: 0.09 K/UL (ref 0.05–0.5)
EOSINOPHILS RELATIVE PERCENT: 2 % (ref 0–6)
EPI CELLS #/AREA URNS HPF: ABNORMAL /HPF
ERYTHROCYTE [DISTWIDTH] IN BLOOD BY AUTOMATED COUNT: 13.5 % (ref 11.5–15)
GFR, ESTIMATED: 48 ML/MIN/1.73M2
GLUCOSE SERPL-MCNC: 85 MG/DL (ref 74–99)
GLUCOSE UR STRIP-MCNC: NEGATIVE MG/DL
HCT VFR BLD AUTO: 40.9 % (ref 37–54)
HGB BLD-MCNC: 13 G/DL (ref 12.5–16.5)
HGB UR QL STRIP.AUTO: NEGATIVE
IMM GRANULOCYTES # BLD AUTO: <0.03 K/UL (ref 0–0.58)
IMM GRANULOCYTES NFR BLD: 0 % (ref 0–5)
KETONES UR STRIP-MCNC: NEGATIVE MG/DL
LEUKOCYTE ESTERASE UR QL STRIP: NEGATIVE
LYMPHOCYTES NFR BLD: 1.8 K/UL (ref 1.5–4)
LYMPHOCYTES RELATIVE PERCENT: 42 % (ref 20–42)
MAGNESIUM SERPL-MCNC: 1.7 MG/DL (ref 1.6–2.6)
MCH RBC QN AUTO: 28.6 PG (ref 26–35)
MCHC RBC AUTO-ENTMCNC: 31.8 G/DL (ref 32–34.5)
MCV RBC AUTO: 90.1 FL (ref 80–99.9)
MICROALBUMIN UR-MCNC: 190 MG/L (ref 0–19)
MICROALBUMIN/CREAT UR-RTO: 74 MCG/MG CREAT (ref 0–30)
MONOCYTES NFR BLD: 0.27 K/UL (ref 0.1–0.95)
MONOCYTES NFR BLD: 6 % (ref 2–12)
NEUTROPHILS NFR BLD: 48 % (ref 43–80)
NEUTS SEG NFR BLD: 2.07 K/UL (ref 1.8–7.3)
NITRITE UR QL STRIP: NEGATIVE
PH UR STRIP: 5.5 [PH] (ref 5–8)
PHOSPHATE SERPL-MCNC: 2.6 MG/DL (ref 2.5–4.5)
PLATELET # BLD AUTO: 366 K/UL (ref 130–450)
PMV BLD AUTO: 9.9 FL (ref 7–12)
POTASSIUM SERPL-SCNC: 3.3 MMOL/L (ref 3.5–5)
PROT SERPL-MCNC: 7.6 G/DL (ref 6.4–8.3)
PROT UR STRIP-MCNC: 30 MG/DL
PTH-INTACT SERPL-MCNC: 97 PG/ML (ref 15–65)
RBC # BLD AUTO: 4.54 M/UL (ref 3.8–5.8)
RBC #/AREA URNS HPF: ABNORMAL /HPF
SODIUM SERPL-SCNC: 144 MMOL/L (ref 132–146)
SP GR UR STRIP: 1.02 (ref 1–1.03)
UROBILINOGEN UR STRIP-ACNC: 0.2 EU/DL (ref 0–1)
WBC #/AREA URNS HPF: ABNORMAL /HPF
WBC OTHER # BLD: 4.3 K/UL (ref 4.5–11.5)

## 2025-03-05 PROCEDURE — 82570 ASSAY OF URINE CREATININE: CPT

## 2025-03-05 PROCEDURE — 36415 COLL VENOUS BLD VENIPUNCTURE: CPT

## 2025-03-05 PROCEDURE — 83970 ASSAY OF PARATHORMONE: CPT

## 2025-03-05 PROCEDURE — 80053 COMPREHEN METABOLIC PANEL: CPT

## 2025-03-05 PROCEDURE — 84100 ASSAY OF PHOSPHORUS: CPT

## 2025-03-05 PROCEDURE — 81001 URINALYSIS AUTO W/SCOPE: CPT

## 2025-03-05 PROCEDURE — 85025 COMPLETE CBC W/AUTO DIFF WBC: CPT

## 2025-03-05 PROCEDURE — 83735 ASSAY OF MAGNESIUM: CPT

## 2025-03-05 PROCEDURE — 82043 UR ALBUMIN QUANTITATIVE: CPT

## 2025-03-07 ENCOUNTER — HOSPITAL ENCOUNTER (OUTPATIENT)
Dept: OTHER | Age: 60
Setting detail: THERAPIES SERIES
Discharge: HOME | End: 2025-03-07
Payer: MEDICARE

## 2025-03-07 VITALS
HEART RATE: 62 BPM | BODY MASS INDEX: 26.45 KG/M2 | OXYGEN SATURATION: 99 % | WEIGHT: 195 LBS | SYSTOLIC BLOOD PRESSURE: 120 MMHG | DIASTOLIC BLOOD PRESSURE: 70 MMHG | RESPIRATION RATE: 16 BRPM

## 2025-03-07 LAB
ANION GAP SERPL CALCULATED.3IONS-SCNC: 14 MMOL/L (ref 7–16)
BNP SERPL-MCNC: 9225 PG/ML (ref 0–125)
BUN SERPL-MCNC: 18 MG/DL (ref 6–20)
CALCIUM SERPL-MCNC: 8.9 MG/DL (ref 8.6–10.2)
CHLORIDE SERPL-SCNC: 103 MMOL/L (ref 98–107)
CO2 SERPL-SCNC: 25 MMOL/L (ref 22–29)
CREAT SERPL-MCNC: 1.7 MG/DL (ref 0.7–1.2)
GFR, ESTIMATED: 48 ML/MIN/1.73M2
GLUCOSE SERPL-MCNC: 108 MG/DL (ref 74–99)
POTASSIUM SERPL-SCNC: 3.8 MMOL/L (ref 3.5–5)
SODIUM SERPL-SCNC: 142 MMOL/L (ref 132–146)

## 2025-03-07 PROCEDURE — 99204 OFFICE O/P NEW MOD 45 MIN: CPT

## 2025-03-07 PROCEDURE — 80048 BASIC METABOLIC PNL TOTAL CA: CPT

## 2025-03-07 PROCEDURE — 83880 ASSAY OF NATRIURETIC PEPTIDE: CPT

## 2025-03-07 ASSESSMENT — PATIENT HEALTH QUESTIONNAIRE - PHQ9
2. FEELING DOWN, DEPRESSED OR HOPELESS: NOT AT ALL
1. LITTLE INTEREST OR PLEASURE IN DOING THINGS: NOT AT ALL
SUM OF ALL RESPONSES TO PHQ QUESTIONS 1-9: 0

## 2025-03-07 NOTE — PROGRESS NOTES
Congestive Heart Failure Clinic   [facility]      Referring Provider: Dr. Muro  Primary Care Physician: Giuseppe Cote MD   Cardiologist: Dr. Sethi  Nephrologist: Dr. Diaz      HISTORY OF PRESENT ILLNESS:     Maryjo Gomez Sr. is a 59 y.o. (1965) male with a history of HFrEF (EF < 40%)  Pre Cupid:     Lab Results   Component Value Date    LVEF 25 03/27/2023     Post Cupid:    Lab Results   Component Value Date    EFBP 22 (A) 02/19/2025         He presents to the CHF clinic for ongoing evaluation and monitoring of heart failure.    In the CHF clinic today he denies any adverse symptoms except:  [] Dizziness or lightheadedness   [] Syncope or near syncope  [] Recent Fall  [] Shortness of breath at rest   [] Dyspnea with exertion  [] Decline in functional capacity (unable to perform activities they had previously been able to do)  [] Fatigue   [] Orthopnea  [] PND  [] Nocturnal cough  [] Hemoptysis  [] Chest pain, pressure, or discomfort  [] Palpitations  [] Abdominal distention  [] Abdominal bloating  [] Early satiety  [] Blood in stool   [] Diarrhea  [] Constipation  [] Nausea/Vomiting  [] Decreased urinary response to oral diuretic   [] Scrotal swelling   [] Lower extremity edema  [] Used PRN doses of oral diuretic   [] Weight gain    Wt Readings from Last 3 Encounters:   03/07/25 88.5 kg (195 lb)   02/28/25 90.7 kg (200 lb)   02/28/25 86.6 kg (191 lb)           SOCIAL HISTORY:  [x] Denies tobacco, alcohol or illicit drug abuse  [] Tobacco use:  [] ETOH use:  [] Illicit drug use:        MEDICATIONS:    No Known Allergies  Prior to Visit Medications    Medication Sig Taking? Authorizing Provider   metoprolol succinate (TOPROL XL) 50 MG extended release tablet Take 1 tablet by mouth in the morning and at bedtime Yes Lula Sethi MD   cyclobenzaprine (FLEXERIL) 5 MG tablet Take 1 tablet by mouth 2 times daily as needed for Muscle

## 2025-03-24 ENCOUNTER — HOSPITAL ENCOUNTER (OUTPATIENT)
Dept: OTHER | Age: 60
Setting detail: THERAPIES SERIES
Discharge: HOME | End: 2025-03-24
Payer: MEDICARE

## 2025-03-24 VITALS
SYSTOLIC BLOOD PRESSURE: 126 MMHG | WEIGHT: 195 LBS | RESPIRATION RATE: 18 BRPM | BODY MASS INDEX: 26.45 KG/M2 | OXYGEN SATURATION: 100 % | DIASTOLIC BLOOD PRESSURE: 80 MMHG | HEART RATE: 65 BPM

## 2025-03-24 LAB
ANION GAP SERPL CALCULATED.3IONS-SCNC: 15 MMOL/L (ref 7–16)
BNP SERPL-MCNC: 5819 PG/ML (ref 0–125)
BUN SERPL-MCNC: 19 MG/DL (ref 6–20)
CALCIUM SERPL-MCNC: 9.3 MG/DL (ref 8.6–10.2)
CHLORIDE SERPL-SCNC: 101 MMOL/L (ref 98–107)
CO2 SERPL-SCNC: 26 MMOL/L (ref 22–29)
CREAT SERPL-MCNC: 1.7 MG/DL (ref 0.7–1.2)
GFR, ESTIMATED: 48 ML/MIN/1.73M2
GLUCOSE SERPL-MCNC: 94 MG/DL (ref 74–99)
POTASSIUM SERPL-SCNC: 3.8 MMOL/L (ref 3.5–5)
SODIUM SERPL-SCNC: 142 MMOL/L (ref 132–146)

## 2025-03-24 PROCEDURE — 99214 OFFICE O/P EST MOD 30 MIN: CPT

## 2025-03-24 PROCEDURE — 83880 ASSAY OF NATRIURETIC PEPTIDE: CPT

## 2025-03-24 PROCEDURE — 80048 BASIC METABOLIC PNL TOTAL CA: CPT

## 2025-03-24 RX ORDER — HYDRALAZINE HYDROCHLORIDE 100 MG/1
100 TABLET, FILM COATED ORAL 3 TIMES DAILY
Qty: 60 TABLET | Refills: 3 | Status: SHIPPED | OUTPATIENT
Start: 2025-03-24

## 2025-03-24 RX ORDER — GLIMEPIRIDE 2 MG/1
2 TABLET ORAL
COMMUNITY
Start: 2025-03-05

## 2025-03-24 NOTE — PROGRESS NOTES
Congestive Heart Failure Clinic   [facility]      Referring Provider: Dr. Muro  Primary Care Physician: Giuseppe Cote MD   Cardiologist: Dr. Sethi  Nephrologist: Dr. Diaz      HISTORY OF PRESENT ILLNESS:     Maryjo Gomez Sr. is a 59 y.o. (1965) male with a history of HFrEF (EF < 40%)  Pre Cupid:     Lab Results   Component Value Date    LVEF 25 03/27/2023     Post Cupid:    Lab Results   Component Value Date    EFBP 22 (A) 02/19/2025         He presents to the CHF clinic for ongoing evaluation and monitoring of heart failure.    In the CHF clinic today he denies any adverse symptoms except:  [] Dizziness or lightheadedness   [] Syncope or near syncope  [] Recent Fall  [] Shortness of breath at rest   [] Dyspnea with exertion  [] Decline in functional capacity (unable to perform activities they had previously been able to do)  [] Fatigue   [] Orthopnea  [] PND  [] Nocturnal cough  [] Hemoptysis  [] Chest pain, pressure, or discomfort  [] Palpitations  [] Abdominal distention  [] Abdominal bloating  [] Early satiety  [] Blood in stool   [] Diarrhea  [] Constipation  [] Nausea/Vomiting  [] Decreased urinary response to oral diuretic   [] Scrotal swelling   [] Lower extremity edema  [] Used PRN doses of oral diuretic   [] Weight gain    Wt Readings from Last 3 Encounters:   03/24/25 88.5 kg (195 lb)   03/07/25 88.5 kg (195 lb)   02/28/25 90.7 kg (200 lb)           SOCIAL HISTORY:  [x] Denies tobacco, alcohol or illicit drug abuse  [] Tobacco use:  [] ETOH use:  [] Illicit drug use:        MEDICATIONS:    No Known Allergies  Prior to Visit Medications    Medication Sig Taking? Authorizing Provider   glimepiride (AMARYL) 2 MG tablet Take 1 tablet by mouth every morning (before breakfast) Yes Provider, MD Kendall   metoprolol succinate (TOPROL XL) 50 MG extended release tablet Take 1 tablet by mouth in the morning and at bedtime Yes

## 2025-03-24 NOTE — PLAN OF CARE
Problem: Chronic Conditions and Co-morbidities  Goal: Patient's chronic conditions and co-morbidity symptoms are monitored and maintained or improved  Flowsheets (Taken 3/24/2025 0803)  Care Plan - Patient's Chronic Conditions and Co-Morbidity Symptoms are Monitored and Maintained or Improved: Monitor and assess patient's chronic conditions and comorbid symptoms for stability, deterioration, or improvement

## 2025-04-21 ENCOUNTER — HOSPITAL ENCOUNTER (OUTPATIENT)
Dept: OTHER | Age: 60
Setting detail: THERAPIES SERIES
Discharge: HOME | End: 2025-04-21
Payer: MEDICARE

## 2025-04-21 VITALS
WEIGHT: 199 LBS | OXYGEN SATURATION: 95 % | SYSTOLIC BLOOD PRESSURE: 147 MMHG | DIASTOLIC BLOOD PRESSURE: 99 MMHG | RESPIRATION RATE: 18 BRPM | BODY MASS INDEX: 26.99 KG/M2 | HEART RATE: 98 BPM

## 2025-04-21 LAB
ANION GAP SERPL CALCULATED.3IONS-SCNC: 16 MMOL/L (ref 7–16)
BNP SERPL-MCNC: ABNORMAL PG/ML (ref 0–125)
BUN SERPL-MCNC: 24 MG/DL (ref 6–20)
CALCIUM SERPL-MCNC: 9.7 MG/DL (ref 8.6–10)
CHLORIDE SERPL-SCNC: 105 MMOL/L (ref 98–107)
CO2 SERPL-SCNC: 21 MMOL/L (ref 22–29)
CREAT SERPL-MCNC: 1.8 MG/DL (ref 0.7–1.2)
GFR, ESTIMATED: 44 ML/MIN/1.73M2
GLUCOSE SERPL-MCNC: 120 MG/DL (ref 74–99)
POTASSIUM SERPL-SCNC: 3.6 MMOL/L (ref 3.5–5.1)
SODIUM SERPL-SCNC: 143 MMOL/L (ref 136–145)

## 2025-04-21 PROCEDURE — 80048 BASIC METABOLIC PNL TOTAL CA: CPT

## 2025-04-21 PROCEDURE — 83880 ASSAY OF NATRIURETIC PEPTIDE: CPT

## 2025-04-21 PROCEDURE — 99214 OFFICE O/P EST MOD 30 MIN: CPT

## 2025-04-21 RX ORDER — CARVEDILOL 25 MG/1
25 TABLET ORAL 2 TIMES DAILY WITH MEALS
COMMUNITY

## 2025-04-21 NOTE — PLAN OF CARE
Problem: Chronic Conditions and Co-morbidities  Goal: Patient's chronic conditions and co-morbidity symptoms are monitored and maintained or improved  Flowsheets (Taken 4/21/2025 7300)  Care Plan - Patient's Chronic Conditions and Co-Morbidity Symptoms are Monitored and Maintained or Improved: Monitor and assess patient's chronic conditions and comorbid symptoms for stability, deterioration, or improvement

## 2025-04-21 NOTE — PROGRESS NOTES
Congestive Heart Failure Clinic   [facility]      Referring Provider: Dr. Muro  Primary Care Physician: Giuseppe Cote MD   Cardiologist: Dr. Sethi  Nephrologist: Dr. Diaz      HISTORY OF PRESENT ILLNESS:     Maryjo Gomez Sr. is a 59 y.o. (1965) male with a history of HFrEF (EF < 40%)  Pre Cupid:     Lab Results   Component Value Date    LVEF 25 03/27/2023     Post Cupid:    Lab Results   Component Value Date    EFBP 22 (A) 02/19/2025         He presents to the CHF clinic for ongoing evaluation and monitoring of heart failure.    In the CHF clinic today he denies any adverse symptoms except:  [] Dizziness or lightheadedness   [] Syncope or near syncope  [] Recent Fall  [] Shortness of breath at rest   [] Dyspnea with exertion  [] Decline in functional capacity (unable to perform activities they had previously been able to do)  [] Fatigue   [] Orthopnea  [] PND  [] Nocturnal cough  [] Hemoptysis  [] Chest pain, pressure, or discomfort  [] Palpitations  [] Abdominal distention  [] Abdominal bloating  [] Early satiety  [] Blood in stool   [] Diarrhea  [] Constipation  [] Nausea/Vomiting  [] Decreased urinary response to oral diuretic   [] Scrotal swelling   [] Lower extremity edema  [] Used PRN doses of oral diuretic   [] Weight gain    Wt Readings from Last 3 Encounters:   04/21/25 90.3 kg (199 lb)   03/24/25 88.5 kg (195 lb)   03/07/25 88.5 kg (195 lb)       SOCIAL HISTORY:  [x] Denies tobacco, alcohol or illicit drug abuse  [] Tobacco use:  [] ETOH use:  [] Illicit drug use:        MEDICATIONS:    No Known Allergies  Prior to Visit Medications    Medication Sig Taking? Authorizing Provider   carvedilol (COREG) 25 MG tablet Take 1 tablet by mouth 2 times daily (with meals) Yes ProviderKendall MD   glimepiride (AMARYL) 2 MG tablet Take 1 tablet by mouth every morning (before breakfast) Yes Provider, MD Kendall   hydrALAZINE

## 2025-05-19 PROCEDURE — 93296 REM INTERROG EVL PM/IDS: CPT | Performed by: INTERNAL MEDICINE

## 2025-05-19 PROCEDURE — 93297 REM INTERROG DEV EVAL ICPMS: CPT | Performed by: INTERNAL MEDICINE

## 2025-05-19 PROCEDURE — 93295 DEV INTERROG REMOTE 1/2/MLT: CPT | Performed by: INTERNAL MEDICINE

## 2025-05-20 ENCOUNTER — HOSPITAL ENCOUNTER (OUTPATIENT)
Dept: OTHER | Age: 60
Setting detail: THERAPIES SERIES
Discharge: HOME | End: 2025-05-20
Payer: MEDICARE

## 2025-05-20 VITALS
BODY MASS INDEX: 26.99 KG/M2 | HEART RATE: 58 BPM | RESPIRATION RATE: 18 BRPM | SYSTOLIC BLOOD PRESSURE: 105 MMHG | WEIGHT: 199 LBS | OXYGEN SATURATION: 99 % | DIASTOLIC BLOOD PRESSURE: 62 MMHG

## 2025-05-20 DIAGNOSIS — I50.23 ACUTE ON CHRONIC SYSTOLIC CHF (CONGESTIVE HEART FAILURE) (HCC): ICD-10-CM

## 2025-05-20 DIAGNOSIS — I50.9 ACUTE DECOMPENSATED HEART FAILURE (HCC): Primary | ICD-10-CM

## 2025-05-20 LAB
ANION GAP SERPL CALCULATED.3IONS-SCNC: 16 MMOL/L (ref 7–16)
BNP SERPL-MCNC: 3175 PG/ML (ref 0–125)
BUN SERPL-MCNC: 22 MG/DL (ref 6–20)
CALCIUM SERPL-MCNC: 8.9 MG/DL (ref 8.6–10)
CHLORIDE SERPL-SCNC: 98 MMOL/L (ref 98–107)
CO2 SERPL-SCNC: 25 MMOL/L (ref 22–29)
CREAT SERPL-MCNC: 1.9 MG/DL (ref 0.7–1.2)
GFR, ESTIMATED: 41 ML/MIN/1.73M2
GLUCOSE SERPL-MCNC: 117 MG/DL (ref 74–99)
POTASSIUM SERPL-SCNC: 3 MMOL/L (ref 3.5–5.1)
SODIUM SERPL-SCNC: 139 MMOL/L (ref 136–145)

## 2025-05-20 PROCEDURE — 83880 ASSAY OF NATRIURETIC PEPTIDE: CPT

## 2025-05-20 PROCEDURE — 80048 BASIC METABOLIC PNL TOTAL CA: CPT

## 2025-05-20 PROCEDURE — 99214 OFFICE O/P EST MOD 30 MIN: CPT

## 2025-05-20 RX ORDER — POTASSIUM CHLORIDE 750 MG/1
10 TABLET, EXTENDED RELEASE ORAL DAILY
Qty: 30 TABLET | Refills: 0 | Status: SHIPPED | OUTPATIENT
Start: 2025-05-20

## 2025-05-20 NOTE — PROGRESS NOTES
Congestive Heart Failure Clinic   [facility]      Referring Provider: Dr. Muro  Primary Care Physician: Giuseppe Cote MD   Cardiologist: Dr. Sethi  Nephrologist: Dr. Diaz      HISTORY OF PRESENT ILLNESS:     Maryjo Gomez Sr. is a 59 y.o. (1965) male with a history of HFrEF (EF < 40%)  Pre Cupid:     Lab Results   Component Value Date    LVEF 25 03/27/2023     Post Cupid:    Lab Results   Component Value Date    EFBP 22 (A) 02/19/2025         He presents to the CHF clinic for ongoing evaluation and monitoring of heart failure.    In the CHF clinic today he denies any adverse symptoms except:  [] Dizziness or lightheadedness   [] Syncope or near syncope  [] Recent Fall  [] Shortness of breath at rest   [] Dyspnea with exertion  [] Decline in functional capacity (unable to perform activities they had previously been able to do)  [] Fatigue   [] Orthopnea  [] PND  [] Nocturnal cough  [] Hemoptysis  [] Chest pain, pressure, or discomfort  [] Palpitations  [] Abdominal distention  [] Abdominal bloating  [] Early satiety  [] Blood in stool   [] Diarrhea  [] Constipation  [] Nausea/Vomiting  [] Decreased urinary response to oral diuretic   [] Scrotal swelling   [] Lower extremity edema  [] Used PRN doses of oral diuretic   [] Weight gain    Wt Readings from Last 3 Encounters:   05/20/25 90.3 kg (199 lb)   04/21/25 90.3 kg (199 lb)   03/24/25 88.5 kg (195 lb)       SOCIAL HISTORY:  [x] Denies tobacco, alcohol or illicit drug abuse  [] Tobacco use:  [] ETOH use:  [] Illicit drug use:        MEDICATIONS:    No Known Allergies  Prior to Visit Medications    Medication Sig Taking? Authorizing Provider   carvedilol (COREG) 25 MG tablet Take 1 tablet by mouth 2 times daily (with meals) Yes ProviderKendall MD   glimepiride (AMARYL) 2 MG tablet Take 1 tablet by mouth every morning (before breakfast) Yes Provider, MD Kendall   hydrALAZINE

## 2025-05-20 NOTE — PLAN OF CARE
Problem: Chronic Conditions and Co-morbidities  Goal: Patient's chronic conditions and co-morbidity symptoms are monitored and maintained or improved  Flowsheets (Taken 5/20/2025 9020)  Care Plan - Patient's Chronic Conditions and Co-Morbidity Symptoms are Monitored and Maintained or Improved: Monitor and assess patient's chronic conditions and comorbid symptoms for stability, deterioration, or improvement

## 2025-07-13 ENCOUNTER — HOSPITAL ENCOUNTER (EMERGENCY)
Age: 60
Discharge: HOME | End: 2025-07-13
Attending: EMERGENCY MEDICINE
Payer: MEDICARE

## 2025-07-13 VITALS
DIASTOLIC BLOOD PRESSURE: 89 MMHG | RESPIRATION RATE: 17 BRPM | HEART RATE: 81 BPM | OXYGEN SATURATION: 100 % | SYSTOLIC BLOOD PRESSURE: 122 MMHG | TEMPERATURE: 97.7 F

## 2025-07-13 DIAGNOSIS — R06.00 DYSPNEA, UNSPECIFIED TYPE: Primary | ICD-10-CM

## 2025-07-13 PROCEDURE — 99283 EMERGENCY DEPT VISIT LOW MDM: CPT

## 2025-07-13 PROCEDURE — 6370000000 HC RX 637 (ALT 250 FOR IP): Performed by: EMERGENCY MEDICINE

## 2025-07-13 PROCEDURE — 93005 ELECTROCARDIOGRAM TRACING: CPT | Performed by: EMERGENCY MEDICINE

## 2025-07-13 RX ORDER — FUROSEMIDE 40 MG/1
80 TABLET ORAL ONCE
Status: COMPLETED | OUTPATIENT
Start: 2025-07-13 | End: 2025-07-13

## 2025-07-13 RX ADMIN — FUROSEMIDE 80 MG: 40 TABLET ORAL at 01:57

## 2025-07-13 NOTE — ED PROVIDER NOTES
[facility] EMERGENCY DEPARTMENT  EMERGENCY DEPARTMENT ENCOUNTER      Pt Name: Maryjo Gomez Sr.  MRN: [id number]  Birthdate 1965  Date of evaluation: 7/13/2025  Provider: Ras Juares MD     CHIEF COMPLAINT       Chief Complaint   Patient presents with    Shortness of Breath     Hx of chf states he needs lasix         HISTORY OF PRESENT ILLNESS   (Location/Symptom, Timing/Onset, Context/Setting, Quality, Duration, Modifying Factors, Severity) Note limiting factors.        HPI    Maryjo Gomez Sr. is a 59 y.o. male who presents to the emergency department with shortness of breath he has a history of CHF and EF about 20%.  He said 2 days of worsening orthopnea and shortness of breath.  He is requesting an extra dose of oral Lasix.  Denies leg swelling weight gain cough or congestion.  He denies fevers and chest pain as well no other complaints says this feels exactly like when he becomes slightly volume overloaded from his heart failure.    Nursing Notes were reviewed.    REVIEW OF SYSTEMS    (2+ for level 4; 10+ for level 5)   Review of Systems    PAST MEDICAL HISTORY     Past Medical History:   Diagnosis Date    Cerebral artery occlusion with cerebral infarction (HCC) 2017    no residual    CHF (congestive heart failure) (HCC)     Colon cancer screening     Dilated cardiomyopathy (HCC)     Follows with Dr. Sethi-last OV     Hypertension     Nonrheumatic mitral (valve) insufficiency     Nonrheumatic tricuspid (valve) insufficiency        SURGICAL HISTORY       Past Surgical History:   Procedure Laterality Date    CARDIAC DEFIBRILLATOR PLACEMENT Left 10/10/2017    Medtronic Dual ICD,     COLONOSCOPY N/A 1/20/2021    COLONOSCOPY WITH BIOPSY performed by Marlon Waters MD at [facility] ENDOSCOPY    PACEMAKER PLACEMENT      TONSILLECTOMY         CURRENT MEDICATIONS       Previous Medications    ALBUTEROL SULFATE HFA (VENTOLIN HFA) 108 (90 BASE) MCG/ACT INHALER    Inhale 2

## 2025-07-15 LAB
EKG ATRIAL RATE: 68 BPM
EKG P AXIS: 49 DEGREES
EKG P-R INTERVAL: 212 MS
EKG Q-T INTERVAL: 532 MS
EKG QRS DURATION: 218 MS
EKG QTC CALCULATION (BAZETT): 565 MS
EKG R AXIS: 47 DEGREES
EKG T AXIS: 2 DEGREES
EKG VENTRICULAR RATE: 68 BPM

## 2025-07-15 PROCEDURE — 93010 ELECTROCARDIOGRAM REPORT: CPT | Performed by: INTERNAL MEDICINE

## 2025-07-22 ENCOUNTER — HOSPITAL ENCOUNTER (OUTPATIENT)
Dept: OTHER | Age: 60
Setting detail: THERAPIES SERIES
Discharge: HOME | End: 2025-07-22
Payer: MEDICARE

## 2025-07-22 VITALS
BODY MASS INDEX: 25.9 KG/M2 | SYSTOLIC BLOOD PRESSURE: 107 MMHG | HEART RATE: 96 BPM | RESPIRATION RATE: 18 BRPM | DIASTOLIC BLOOD PRESSURE: 72 MMHG | OXYGEN SATURATION: 98 % | WEIGHT: 191 LBS

## 2025-07-22 LAB
ANION GAP SERPL CALCULATED.3IONS-SCNC: 18 MMOL/L (ref 7–16)
BNP SERPL-MCNC: ABNORMAL PG/ML (ref 0–125)
BUN SERPL-MCNC: 45 MG/DL (ref 6–20)
CALCIUM SERPL-MCNC: 9.6 MG/DL (ref 8.6–10)
CHLORIDE SERPL-SCNC: 105 MMOL/L (ref 98–107)
CO2 SERPL-SCNC: 20 MMOL/L (ref 22–29)
CREAT SERPL-MCNC: 2.5 MG/DL (ref 0.7–1.2)
GFR, ESTIMATED: 29 ML/MIN/1.73M2
GLUCOSE SERPL-MCNC: 182 MG/DL (ref 74–99)
POTASSIUM SERPL-SCNC: 3.5 MMOL/L (ref 3.5–5.1)
SODIUM SERPL-SCNC: 143 MMOL/L (ref 136–145)

## 2025-07-22 PROCEDURE — 96374 THER/PROPH/DIAG INJ IV PUSH: CPT

## 2025-07-22 PROCEDURE — 83880 ASSAY OF NATRIURETIC PEPTIDE: CPT

## 2025-07-22 PROCEDURE — 6360000002 HC RX W HCPCS: Performed by: INTERNAL MEDICINE

## 2025-07-22 PROCEDURE — 2500000003 HC RX 250 WO HCPCS: Performed by: INTERNAL MEDICINE

## 2025-07-22 PROCEDURE — 80048 BASIC METABOLIC PNL TOTAL CA: CPT

## 2025-07-22 PROCEDURE — 99214 OFFICE O/P EST MOD 30 MIN: CPT

## 2025-07-22 RX ORDER — FUROSEMIDE 10 MG/ML
40 INJECTION INTRAMUSCULAR; INTRAVENOUS ONCE
Status: COMPLETED | OUTPATIENT
Start: 2025-07-22 | End: 2025-07-22

## 2025-07-22 RX ORDER — SODIUM CHLORIDE 0.9 % (FLUSH) 0.9 %
5-40 SYRINGE (ML) INJECTION 2 TIMES DAILY
Status: DISCONTINUED | OUTPATIENT
Start: 2025-07-22 | End: 2025-07-23 | Stop reason: HOSPADM

## 2025-07-22 RX ADMIN — FUROSEMIDE 40 MG: 10 INJECTION, SOLUTION INTRAMUSCULAR; INTRAVENOUS at 09:14

## 2025-07-22 RX ADMIN — SODIUM CHLORIDE, PRESERVATIVE FREE 10 ML: 5 INJECTION INTRAVENOUS at 09:15

## 2025-07-22 NOTE — PROGRESS NOTES
Congestive Heart Failure Clinic   [facility]      Referring Provider: Dr. Muro  Primary Care Physician: Giuseppe Cote MD   Cardiologist: Dr. Sethi  Nephrologist: Dr. Diaz      HISTORY OF PRESENT ILLNESS:     Maryjo Gomez Sr. is a 59 y.o. (1965) male with a history of HFrEF (EF < 40%)  Pre Cupid:     Lab Results   Component Value Date    LVEF 25 03/27/2023     Post Cupid:    Lab Results   Component Value Date    EFBP 22 (A) 02/19/2025         He presents to the CHF clinic for ongoing evaluation and monitoring of heart failure.    In the CHF clinic today he denies any adverse symptoms except:  [] Dizziness or lightheadedness   [] Syncope or near syncope  [] Recent Fall  [x] Shortness of breath at rest   [x] Dyspnea with exertion  [] Decline in functional capacity (unable to perform activities they had previously been able to do)  [] Fatigue   [x] Orthopnea  [] PND  [x] Nocturnal cough  [] Hemoptysis  [] Chest pain, pressure, or discomfort  [] Palpitations  [] Abdominal distention  [] Abdominal bloating  [] Early satiety  [] Blood in stool   [x] Diarrhea  [] Constipation  [] Nausea/Vomiting  [] Decreased urinary response to oral diuretic   [] Scrotal swelling   [] Lower extremity edema  [x] Used PRN doses of oral diuretic - ED visit on   [] Weight gain    Wt Readings from Last 3 Encounters:   07/22/25 86.6 kg (191 lb)   05/20/25 90.3 kg (199 lb)   04/21/25 90.3 kg (199 lb)       SOCIAL HISTORY:  [x] Denies tobacco, alcohol or illicit drug abuse  [] Tobacco use:  [] ETOH use:  [] Illicit drug use:        MEDICATIONS:    No Known Allergies  Prior to Visit Medications    Medication Sig Taking? Authorizing Provider   carvedilol (COREG) 25 MG tablet Take 1 tablet by mouth 2 times daily (with meals) Yes Provider, MD Kendall   glimepiride (AMARYL) 2 MG tablet Take 1 tablet by mouth every morning (before breakfast) Yes Provider

## 2025-07-22 NOTE — PLAN OF CARE
Problem: Chronic Conditions and Co-morbidities  Goal: Patient's chronic conditions and co-morbidity symptoms are monitored and maintained or improved  Flowsheets (Taken 7/22/2025 0902)  Care Plan - Patient's Chronic Conditions and Co-Morbidity Symptoms are Monitored and Maintained or Improved: Monitor and assess patient's chronic conditions and comorbid symptoms for stability, deterioration, or improvement

## 2025-07-23 DIAGNOSIS — I50.23 ACUTE ON CHRONIC SYSTOLIC CHF (CONGESTIVE HEART FAILURE) (HCC): ICD-10-CM

## 2025-07-23 RX ORDER — TORSEMIDE 20 MG/1
40 TABLET ORAL DAILY
Qty: 60 TABLET | Refills: 3 | Status: SHIPPED | OUTPATIENT
Start: 2025-07-23

## 2025-07-23 RX ORDER — POTASSIUM CHLORIDE 1500 MG/1
20 TABLET, EXTENDED RELEASE ORAL DAILY
Qty: 30 TABLET | Refills: 3 | Status: SHIPPED | OUTPATIENT
Start: 2025-07-23

## 2025-07-25 LAB
EKG ATRIAL RATE: 96 BPM
EKG P AXIS: 40 DEGREES
EKG P-R INTERVAL: 212 MS
EKG Q-T INTERVAL: 432 MS
EKG QRS DURATION: 202 MS
EKG QTC CALCULATION (BAZETT): 545 MS
EKG R AXIS: 15 DEGREES
EKG T AXIS: 43 DEGREES
EKG VENTRICULAR RATE: 96 BPM

## 2025-07-28 ENCOUNTER — HOSPITAL ENCOUNTER (OUTPATIENT)
Dept: OTHER | Age: 60
Setting detail: THERAPIES SERIES
Discharge: HOME | End: 2025-07-28
Payer: MEDICARE

## 2025-07-28 ENCOUNTER — RESULTS FOLLOW-UP (OUTPATIENT)
Age: 60
End: 2025-07-28

## 2025-07-28 VITALS
SYSTOLIC BLOOD PRESSURE: 105 MMHG | HEART RATE: 86 BPM | DIASTOLIC BLOOD PRESSURE: 68 MMHG | RESPIRATION RATE: 18 BRPM | BODY MASS INDEX: 24.82 KG/M2 | OXYGEN SATURATION: 98 % | WEIGHT: 183 LBS

## 2025-07-28 DIAGNOSIS — I50.23 ACUTE ON CHRONIC SYSTOLIC CHF (CONGESTIVE HEART FAILURE) (HCC): ICD-10-CM

## 2025-07-28 LAB
ANION GAP SERPL CALCULATED.3IONS-SCNC: 14 MMOL/L (ref 7–16)
BNP SERPL-MCNC: 5611 PG/ML (ref 0–125)
BUN SERPL-MCNC: 33 MG/DL (ref 6–20)
CALCIUM SERPL-MCNC: 9 MG/DL (ref 8.6–10)
CHLORIDE SERPL-SCNC: 102 MMOL/L (ref 98–107)
CO2 SERPL-SCNC: 26 MMOL/L (ref 22–29)
CREAT SERPL-MCNC: 2.3 MG/DL (ref 0.7–1.2)
GFR, ESTIMATED: 33 ML/MIN/1.73M2
GLUCOSE SERPL-MCNC: 146 MG/DL (ref 74–99)
POTASSIUM SERPL-SCNC: 3.1 MMOL/L (ref 3.5–5.1)
SODIUM SERPL-SCNC: 143 MMOL/L (ref 136–145)

## 2025-07-28 PROCEDURE — 99214 OFFICE O/P EST MOD 30 MIN: CPT

## 2025-07-28 PROCEDURE — 80048 BASIC METABOLIC PNL TOTAL CA: CPT

## 2025-07-28 PROCEDURE — 83880 ASSAY OF NATRIURETIC PEPTIDE: CPT

## 2025-07-28 RX ORDER — TORSEMIDE 20 MG/1
20 TABLET ORAL DAILY
Qty: 30 TABLET | Refills: 3 | Status: SHIPPED | OUTPATIENT
Start: 2025-07-28

## 2025-07-28 NOTE — RESULT ENCOUNTER NOTE
Labs and CHF Clinic note reviewed    HFrEF, LVEF 20 to 25% per TTE 2/19/2025.  Has ICD     Patient was hypervolemic at CHF clinic visit on 7/22/2025 and treated with IV Lasix.  Following CHF clinic visit on 7/23/2025, patient's Lasix 40 mg twice daily was discontinued and he was started on torsemide 40 mg daily and also added on potassium chloride 20 mEq daily.     He is euvolemic on exam in clinic today.  Weight 183 pounds today compared to 191 pounds at visit on 7/22/2025.       Wt Readings from Last 3 Encounters:  07/28/25 : 83 kg (183 lb)  07/22/25 : 86.6 kg (191 lb)  05/20/25 : 90.3 kg (199 lb)    BP Readings from Last 1 Encounters:  07/28/25 : 105/68    Pulse Readings from Last 1 Encounters:  07/28/25 : 86      Current GDMT:   Coreg 25 mg p.o. twice daily   Hydralazine 100 mg p.o. 3 times daily   Toprol-XL 50 mg p.o. twice daily   Entresto 49/51 mg p.o. twice daily   Aldactone 25 mg daily   Torsemide 40 mg daily   Potassium chloride 20 mEq daily x 2 days then decrease to 10 mEq daily--started on 7/23/2025       NT proBNP 5611 compared to 34,057 on 7/22/2025, 3175 on 5/20/2025 and 23,072 on 4/21/2025.   BMP shows slight improvement in renal function compared to prior with creatinine 2.3, BUN is 33 and GFR 33 compared to 2.5, 45 and 29 respectively on 7/22/2025.  Creatinine 1.9 with BUN of 22 and GFR 41 on 5/20/2025.  Baseline creatinine appears to be between 1.6-1.9.    1.  Decrease torsemide to 20 mg daily (from 40mg daily)     -- May take additional 20 mg torsemide as needed for rapid weight gain, lower extremity edema or shortness of breath  2.  Take additional potassium chloride 40 mEq x 1 today 7/28/25 given hypokalemia   3. Can you please verify how patient was taking potassium chloride--?? 10meq daily or 20meq daily??     -If only taking 10meq daily, please increase to 20meq daily beginning on 7/29/25. Please let me know how he was taking potassium chloride and I can send new script for potassium

## 2025-07-28 NOTE — PLAN OF CARE
Problem: Chronic Conditions and Co-morbidities  Goal: Patient's chronic conditions and co-morbidity symptoms are monitored and maintained or improved  Flowsheets (Taken 7/28/2025 3960)  Care Plan - Patient's Chronic Conditions and Co-Morbidity Symptoms are Monitored and Maintained or Improved: Monitor and assess patient's chronic conditions and comorbid symptoms for stability, deterioration, or improvement

## 2025-07-28 NOTE — PROGRESS NOTES
treated with IV Lasix.  Following CHF clinic visit on 7/23/2025, patient's Lasix 40 mg twice daily was discontinued and he was started on torsemide 40 mg daily and also added on potassium chloride 20 mEq daily.    He is euvolemic on exam in clinic today.  Weight 183 pounds today compared to 191 pounds at visit on 7/22/2025.      Wt Readings from Last 3 Encounters:  07/28/25 : 83 kg (183 lb)  07/22/25 : 86.6 kg (191 lb)  05/20/25 : 90.3 kg (199 lb)    BP Readings from Last 1 Encounters:  07/28/25 : 105/68    Pulse Readings from Last 1 Encounters:  07/28/25 : 86    Current GDMT:  Coreg 25 mg p.o. twice daily  Hydralazine 100 mg p.o. 3 times daily  Toprol-XL 50 mg p.o. twice daily  Entresto 49/51 mg p.o. twice daily  Aldactone 25 mg daily  Torsemide 40 mg daily  Potassium chloride 20 mEq daily x 2 days then decrease to 10 mEq daily--started on 7/23/2025      NT proBNP 5611 compared to 34,057 on 7/22/2025, 3175 on 5/20/2025 and 23,072 on 4/21/2025.  BMP shows slight improvement in renal function compared to prior with creatinine 2.3, BUN is 33 and GFR 33 compared to 2.5, 45 and 29 respectively on 7/22/2025.  Creatinine 1.9 with BUN of 22 and GFR 41 on 5/20/2025.  Baseline creatinine appears to be between 1.6-1.9.    1.  Decrease torsemide to 20 mg daily (from 40mg daily)     -- May take additional 20 mg torsemide as needed for rapid weight gain, lower extremity edema or shortness of breath  2.  Take additional potassium chloride 40 mEq x 1 today 7/28/25 given hypokalemia  3. Can you please verify how patient was taking potassium chloride--?? 10meq daily or 20meq daily??     -If only taking 10meq daily, please increase to 20meq daily beginning on 7/29/25. Please let me know how he was taking potassium chloride and I can send new script for potassium chloride 20meq daily  4.  Stay hydrated with 64 ounces of fluid daily  5.  Please fax lab results and CHF clinic note to nephrology for review  6.  Follow-up in CHF clinic on

## 2025-08-05 ENCOUNTER — HOSPITAL ENCOUNTER (OUTPATIENT)
Dept: OTHER | Age: 60
Setting detail: THERAPIES SERIES
Discharge: HOME | End: 2025-08-05
Payer: MEDICARE

## 2025-08-05 VITALS
DIASTOLIC BLOOD PRESSURE: 72 MMHG | HEART RATE: 74 BPM | WEIGHT: 192 LBS | OXYGEN SATURATION: 98 % | BODY MASS INDEX: 26.04 KG/M2 | SYSTOLIC BLOOD PRESSURE: 120 MMHG | RESPIRATION RATE: 18 BRPM

## 2025-08-05 LAB
ANION GAP SERPL CALCULATED.3IONS-SCNC: 16 MMOL/L (ref 7–16)
BNP SERPL-MCNC: ABNORMAL PG/ML (ref 0–125)
BUN SERPL-MCNC: 44 MG/DL (ref 6–20)
CALCIUM SERPL-MCNC: 9.1 MG/DL (ref 8.6–10)
CHLORIDE SERPL-SCNC: 101 MMOL/L (ref 98–107)
CO2 SERPL-SCNC: 24 MMOL/L (ref 22–29)
CREAT SERPL-MCNC: 2.9 MG/DL (ref 0.7–1.2)
GFR, ESTIMATED: 25 ML/MIN/1.73M2
GLUCOSE SERPL-MCNC: 195 MG/DL (ref 74–99)
POTASSIUM SERPL-SCNC: 3.2 MMOL/L (ref 3.5–5.1)
SODIUM SERPL-SCNC: 141 MMOL/L (ref 136–145)

## 2025-08-05 PROCEDURE — 99214 OFFICE O/P EST MOD 30 MIN: CPT

## 2025-08-05 PROCEDURE — 83880 ASSAY OF NATRIURETIC PEPTIDE: CPT

## 2025-08-05 PROCEDURE — 96374 THER/PROPH/DIAG INJ IV PUSH: CPT

## 2025-08-05 PROCEDURE — 80048 BASIC METABOLIC PNL TOTAL CA: CPT

## 2025-08-05 PROCEDURE — 6360000002 HC RX W HCPCS: Performed by: NURSE PRACTITIONER

## 2025-08-05 PROCEDURE — 2500000003 HC RX 250 WO HCPCS: Performed by: NURSE PRACTITIONER

## 2025-08-05 RX ORDER — SODIUM CHLORIDE 0.9 % (FLUSH) 0.9 %
10 SYRINGE (ML) INJECTION 2 TIMES DAILY
Status: DISCONTINUED | OUTPATIENT
Start: 2025-08-05 | End: 2025-08-06 | Stop reason: HOSPADM

## 2025-08-05 RX ORDER — FUROSEMIDE 10 MG/ML
40 INJECTION INTRAMUSCULAR; INTRAVENOUS ONCE
Status: COMPLETED | OUTPATIENT
Start: 2025-08-05 | End: 2025-08-05

## 2025-08-05 RX ADMIN — FUROSEMIDE 40 MG: 10 INJECTION, SOLUTION INTRAMUSCULAR; INTRAVENOUS at 09:45

## 2025-08-05 RX ADMIN — SODIUM CHLORIDE, PRESERVATIVE FREE 10 ML: 5 INJECTION INTRAVENOUS at 09:45

## 2025-08-08 ENCOUNTER — APPOINTMENT (OUTPATIENT)
Dept: GENERAL RADIOLOGY | Age: 60
End: 2025-08-08
Payer: MEDICARE

## 2025-08-08 ENCOUNTER — HOSPITAL ENCOUNTER (OUTPATIENT)
Age: 60
Setting detail: OBSERVATION
Discharge: HOME | End: 2025-08-09
Admitting: INTERNAL MEDICINE
Payer: MEDICARE

## 2025-08-08 DIAGNOSIS — R79.89 TROPONIN LEVEL ELEVATED: ICD-10-CM

## 2025-08-08 DIAGNOSIS — N17.9 AKI (ACUTE KIDNEY INJURY): ICD-10-CM

## 2025-08-08 DIAGNOSIS — R00.0 TACHYCARDIA: ICD-10-CM

## 2025-08-08 DIAGNOSIS — I50.9 ACUTE ON CHRONIC CONGESTIVE HEART FAILURE, UNSPECIFIED HEART FAILURE TYPE (HCC): Primary | ICD-10-CM

## 2025-08-08 PROCEDURE — 6360000002 HC RX W HCPCS

## 2025-08-08 PROCEDURE — 71045 X-RAY EXAM CHEST 1 VIEW: CPT

## 2025-08-08 PROCEDURE — 93005 ELECTROCARDIOGRAM TRACING: CPT

## 2025-08-08 PROCEDURE — 84484 ASSAY OF TROPONIN QUANT: CPT

## 2025-08-08 PROCEDURE — 96366 THER/PROPH/DIAG IV INF ADDON: CPT

## 2025-08-08 PROCEDURE — 99285 EMERGENCY DEPT VISIT HI MDM: CPT

## 2025-08-08 PROCEDURE — 85025 COMPLETE CBC W/AUTO DIFF WBC: CPT

## 2025-08-08 PROCEDURE — 83880 ASSAY OF NATRIURETIC PEPTIDE: CPT

## 2025-08-08 PROCEDURE — 96365 THER/PROPH/DIAG IV INF INIT: CPT

## 2025-08-08 PROCEDURE — 80053 COMPREHEN METABOLIC PANEL: CPT

## 2025-08-08 RX ORDER — MAGNESIUM SULFATE IN WATER 40 MG/ML
2000 INJECTION, SOLUTION INTRAVENOUS ONCE
Status: COMPLETED | OUTPATIENT
Start: 2025-08-08 | End: 2025-08-09

## 2025-08-08 RX ADMIN — MAGNESIUM SULFATE HEPTAHYDRATE 2000 MG: 40 INJECTION, SOLUTION INTRAVENOUS at 23:39

## 2025-08-09 VITALS
DIASTOLIC BLOOD PRESSURE: 88 MMHG | TEMPERATURE: 97.7 F | BODY MASS INDEX: 26.07 KG/M2 | SYSTOLIC BLOOD PRESSURE: 114 MMHG | HEART RATE: 77 BPM | HEIGHT: 72 IN | OXYGEN SATURATION: 100 % | RESPIRATION RATE: 16 BRPM | WEIGHT: 192.46 LBS

## 2025-08-09 LAB
ALBUMIN SERPL-MCNC: 3.6 G/DL (ref 3.5–5.2)
ALP SERPL-CCNC: 71 U/L (ref 40–129)
ALT SERPL-CCNC: 18 U/L (ref 0–50)
ANION GAP SERPL CALCULATED.3IONS-SCNC: 15 MMOL/L (ref 7–16)
ANION GAP SERPL CALCULATED.3IONS-SCNC: 15 MMOL/L (ref 7–16)
AST SERPL-CCNC: 19 U/L (ref 0–50)
BASOPHILS # BLD: 0.04 K/UL (ref 0–0.2)
BASOPHILS # BLD: 0.04 K/UL (ref 0–0.2)
BASOPHILS NFR BLD: 1 % (ref 0–2)
BASOPHILS NFR BLD: 1 % (ref 0–2)
BILIRUB SERPL-MCNC: 2.1 MG/DL (ref 0–1.2)
BNP SERPL-MCNC: ABNORMAL PG/ML (ref 0–125)
BUN SERPL-MCNC: 43 MG/DL (ref 6–20)
BUN SERPL-MCNC: 44 MG/DL (ref 6–20)
CALCIUM SERPL-MCNC: 9.1 MG/DL (ref 8.6–10)
CALCIUM SERPL-MCNC: 9.1 MG/DL (ref 8.6–10)
CHLORIDE SERPL-SCNC: 102 MMOL/L (ref 98–107)
CHLORIDE SERPL-SCNC: 102 MMOL/L (ref 98–107)
CO2 SERPL-SCNC: 24 MMOL/L (ref 22–29)
CO2 SERPL-SCNC: 25 MMOL/L (ref 22–29)
CREAT SERPL-MCNC: 3 MG/DL (ref 0.7–1.2)
CREAT SERPL-MCNC: 3 MG/DL (ref 0.7–1.2)
EOSINOPHIL # BLD: 0.08 K/UL (ref 0.05–0.5)
EOSINOPHIL # BLD: 0.08 K/UL (ref 0.05–0.5)
EOSINOPHILS RELATIVE PERCENT: 2 % (ref 0–6)
EOSINOPHILS RELATIVE PERCENT: 2 % (ref 0–6)
ERYTHROCYTE [DISTWIDTH] IN BLOOD BY AUTOMATED COUNT: 14.3 % (ref 11.5–15)
ERYTHROCYTE [DISTWIDTH] IN BLOOD BY AUTOMATED COUNT: 14.3 % (ref 11.5–15)
GFR, ESTIMATED: 23 ML/MIN/1.73M2
GFR, ESTIMATED: 24 ML/MIN/1.73M2
GLUCOSE BLD-MCNC: 149 MG/DL (ref 74–99)
GLUCOSE SERPL-MCNC: 123 MG/DL (ref 74–99)
GLUCOSE SERPL-MCNC: 225 MG/DL (ref 74–99)
HBA1C MFR BLD: 6.4 % (ref 4–5.6)
HCT VFR BLD AUTO: 38 % (ref 37–54)
HCT VFR BLD AUTO: 39.5 % (ref 37–54)
HGB BLD-MCNC: 12.8 G/DL (ref 12.5–16.5)
HGB BLD-MCNC: 13.3 G/DL (ref 12.5–16.5)
IMM GRANULOCYTES # BLD AUTO: <0.03 K/UL (ref 0–0.58)
IMM GRANULOCYTES # BLD AUTO: <0.03 K/UL (ref 0–0.58)
IMM GRANULOCYTES NFR BLD: 0 % (ref 0–5)
IMM GRANULOCYTES NFR BLD: 0 % (ref 0–5)
LYMPHOCYTES NFR BLD: 1.16 K/UL (ref 1.5–4)
LYMPHOCYTES NFR BLD: 1.32 K/UL (ref 1.5–4)
LYMPHOCYTES RELATIVE PERCENT: 24 % (ref 20–42)
LYMPHOCYTES RELATIVE PERCENT: 28 % (ref 20–42)
MAGNESIUM SERPL-MCNC: 2.3 MG/DL (ref 1.6–2.6)
MCH RBC QN AUTO: 30.1 PG (ref 26–35)
MCH RBC QN AUTO: 30.2 PG (ref 26–35)
MCHC RBC AUTO-ENTMCNC: 33.7 G/DL (ref 32–34.5)
MCHC RBC AUTO-ENTMCNC: 33.7 G/DL (ref 32–34.5)
MCV RBC AUTO: 89.4 FL (ref 80–99.9)
MCV RBC AUTO: 89.8 FL (ref 80–99.9)
MONOCYTES NFR BLD: 0.29 K/UL (ref 0.1–0.95)
MONOCYTES NFR BLD: 0.3 K/UL (ref 0.1–0.95)
MONOCYTES NFR BLD: 6 % (ref 2–12)
MONOCYTES NFR BLD: 7 % (ref 2–12)
NEUTROPHILS NFR BLD: 62 % (ref 43–80)
NEUTROPHILS NFR BLD: 68 % (ref 43–80)
NEUTS SEG NFR BLD: 2.88 K/UL (ref 1.8–7.3)
NEUTS SEG NFR BLD: 3.29 K/UL (ref 1.8–7.3)
PLATELET # BLD AUTO: 158 K/UL (ref 130–450)
PLATELET # BLD AUTO: 176 K/UL (ref 130–450)
PMV BLD AUTO: 10.6 FL (ref 7–12)
PMV BLD AUTO: 11 FL (ref 7–12)
POTASSIUM SERPL-SCNC: 3.2 MMOL/L (ref 3.5–5.1)
POTASSIUM SERPL-SCNC: 3.2 MMOL/L (ref 3.5–5.1)
PROT SERPL-MCNC: 6.3 G/DL (ref 6.4–8.3)
RBC # BLD AUTO: 4.25 M/UL (ref 3.8–5.8)
RBC # BLD AUTO: 4.4 M/UL (ref 3.8–5.8)
SODIUM SERPL-SCNC: 141 MMOL/L (ref 136–145)
SODIUM SERPL-SCNC: 142 MMOL/L (ref 136–145)
T4 FREE SERPL-MCNC: 1.9 NG/DL (ref 0.9–1.7)
TROPONIN I SERPL HS-MCNC: 51 NG/L (ref 0–22)
TROPONIN I SERPL HS-MCNC: 53 NG/L (ref 0–22)
TSH SERPL DL<=0.05 MIU/L-ACNC: 2.69 UIU/ML (ref 0.27–4.2)
WBC OTHER # BLD: 4.6 K/UL (ref 4.5–11.5)
WBC OTHER # BLD: 4.9 K/UL (ref 4.5–11.5)

## 2025-08-09 PROCEDURE — 6370000000 HC RX 637 (ALT 250 FOR IP): Performed by: PHYSICIAN ASSISTANT

## 2025-08-09 PROCEDURE — G0378 HOSPITAL OBSERVATION PER HR: HCPCS

## 2025-08-09 PROCEDURE — 83036 HEMOGLOBIN GLYCOSYLATED A1C: CPT

## 2025-08-09 PROCEDURE — 84439 ASSAY OF FREE THYROXINE: CPT

## 2025-08-09 PROCEDURE — 84484 ASSAY OF TROPONIN QUANT: CPT

## 2025-08-09 PROCEDURE — 83735 ASSAY OF MAGNESIUM: CPT

## 2025-08-09 PROCEDURE — 80048 BASIC METABOLIC PNL TOTAL CA: CPT

## 2025-08-09 PROCEDURE — 84443 ASSAY THYROID STIM HORMONE: CPT

## 2025-08-09 PROCEDURE — 85025 COMPLETE CBC W/AUTO DIFF WBC: CPT

## 2025-08-09 PROCEDURE — 82962 GLUCOSE BLOOD TEST: CPT

## 2025-08-09 PROCEDURE — 96366 THER/PROPH/DIAG IV INF ADDON: CPT

## 2025-08-09 PROCEDURE — 6360000002 HC RX W HCPCS: Performed by: INTERNAL MEDICINE

## 2025-08-09 PROCEDURE — 96376 TX/PRO/DX INJ SAME DRUG ADON: CPT

## 2025-08-09 PROCEDURE — 6360000002 HC RX W HCPCS: Performed by: EMERGENCY MEDICINE

## 2025-08-09 PROCEDURE — 97161 PT EVAL LOW COMPLEX 20 MIN: CPT

## 2025-08-09 PROCEDURE — APPSS180 APP SPLIT SHARED TIME > 60 MINUTES: Performed by: NURSE PRACTITIONER

## 2025-08-09 PROCEDURE — 96375 TX/PRO/DX INJ NEW DRUG ADDON: CPT

## 2025-08-09 RX ORDER — FUROSEMIDE 10 MG/ML
40 INJECTION INTRAMUSCULAR; INTRAVENOUS 2 TIMES DAILY
Status: DISCONTINUED | OUTPATIENT
Start: 2025-08-09 | End: 2025-08-09

## 2025-08-09 RX ORDER — MAGNESIUM SULFATE IN WATER 40 MG/ML
2000 INJECTION, SOLUTION INTRAVENOUS PRN
Status: DISCONTINUED | OUTPATIENT
Start: 2025-08-09 | End: 2025-08-09 | Stop reason: HOSPADM

## 2025-08-09 RX ORDER — ISOSORBIDE DINITRATE 10 MG/1
10 TABLET ORAL 3 TIMES DAILY
Qty: 90 TABLET | Refills: 3 | Status: ON HOLD | OUTPATIENT
Start: 2025-08-09 | End: 2025-08-16 | Stop reason: HOSPADM

## 2025-08-09 RX ORDER — PROCHLORPERAZINE MALEATE 10 MG
10 TABLET ORAL EVERY 8 HOURS PRN
Status: DISCONTINUED | OUTPATIENT
Start: 2025-08-09 | End: 2025-08-09 | Stop reason: HOSPADM

## 2025-08-09 RX ORDER — FUROSEMIDE 10 MG/ML
40 INJECTION INTRAMUSCULAR; INTRAVENOUS ONCE
Status: COMPLETED | OUTPATIENT
Start: 2025-08-09 | End: 2025-08-09

## 2025-08-09 RX ORDER — FUROSEMIDE 40 MG/1
40 TABLET ORAL 2 TIMES DAILY
Qty: 60 TABLET | Refills: 3 | Status: ON HOLD | OUTPATIENT
Start: 2025-08-09 | End: 2025-08-16

## 2025-08-09 RX ORDER — GLUCAGON 1 MG/ML
1 KIT INJECTION PRN
Status: DISCONTINUED | OUTPATIENT
Start: 2025-08-09 | End: 2025-08-09 | Stop reason: HOSPADM

## 2025-08-09 RX ORDER — ONDANSETRON 2 MG/ML
4 INJECTION INTRAMUSCULAR; INTRAVENOUS EVERY 6 HOURS PRN
Status: DISCONTINUED | OUTPATIENT
Start: 2025-08-09 | End: 2025-08-09 | Stop reason: CLARIF

## 2025-08-09 RX ORDER — ACETAMINOPHEN 650 MG/1
650 SUPPOSITORY RECTAL EVERY 6 HOURS PRN
Status: DISCONTINUED | OUTPATIENT
Start: 2025-08-09 | End: 2025-08-09 | Stop reason: HOSPADM

## 2025-08-09 RX ORDER — ONDANSETRON 4 MG/1
4 TABLET, ORALLY DISINTEGRATING ORAL EVERY 8 HOURS PRN
Status: DISCONTINUED | OUTPATIENT
Start: 2025-08-09 | End: 2025-08-09 | Stop reason: CLARIF

## 2025-08-09 RX ORDER — PROCHLORPERAZINE EDISYLATE 5 MG/ML
10 INJECTION INTRAMUSCULAR; INTRAVENOUS EVERY 6 HOURS PRN
Status: DISCONTINUED | OUTPATIENT
Start: 2025-08-09 | End: 2025-08-09 | Stop reason: HOSPADM

## 2025-08-09 RX ORDER — METOPROLOL SUCCINATE 50 MG/1
50 TABLET, EXTENDED RELEASE ORAL 2 TIMES DAILY
Status: DISCONTINUED | OUTPATIENT
Start: 2025-08-09 | End: 2025-08-09

## 2025-08-09 RX ORDER — INSULIN LISPRO 100 [IU]/ML
0-8 INJECTION, SOLUTION INTRAVENOUS; SUBCUTANEOUS
Status: DISCONTINUED | OUTPATIENT
Start: 2025-08-09 | End: 2025-08-09

## 2025-08-09 RX ORDER — ROSUVASTATIN CALCIUM 10 MG/1
10 TABLET, COATED ORAL DAILY
Status: DISCONTINUED | OUTPATIENT
Start: 2025-08-09 | End: 2025-08-09 | Stop reason: HOSPADM

## 2025-08-09 RX ORDER — HYDRALAZINE HYDROCHLORIDE 50 MG/1
100 TABLET, FILM COATED ORAL 3 TIMES DAILY
Status: DISCONTINUED | OUTPATIENT
Start: 2025-08-09 | End: 2025-08-09 | Stop reason: HOSPADM

## 2025-08-09 RX ORDER — ISOSORBIDE DINITRATE 10 MG/1
10 TABLET ORAL 3 TIMES DAILY
Status: DISCONTINUED | OUTPATIENT
Start: 2025-08-09 | End: 2025-08-09 | Stop reason: HOSPADM

## 2025-08-09 RX ORDER — POTASSIUM CHLORIDE 7.45 MG/ML
10 INJECTION INTRAVENOUS PRN
Status: DISCONTINUED | OUTPATIENT
Start: 2025-08-09 | End: 2025-08-09 | Stop reason: HOSPADM

## 2025-08-09 RX ORDER — ASPIRIN 81 MG/1
81 TABLET ORAL DAILY
Status: DISCONTINUED | OUTPATIENT
Start: 2025-08-09 | End: 2025-08-09 | Stop reason: HOSPADM

## 2025-08-09 RX ORDER — CARVEDILOL 25 MG/1
25 TABLET ORAL 2 TIMES DAILY WITH MEALS
Status: DISCONTINUED | OUTPATIENT
Start: 2025-08-09 | End: 2025-08-09 | Stop reason: HOSPADM

## 2025-08-09 RX ORDER — ENOXAPARIN SODIUM 100 MG/ML
40 INJECTION SUBCUTANEOUS DAILY
Status: DISCONTINUED | OUTPATIENT
Start: 2025-08-09 | End: 2025-08-09

## 2025-08-09 RX ORDER — POLYETHYLENE GLYCOL 3350 17 G/17G
17 POWDER, FOR SOLUTION ORAL DAILY PRN
Status: DISCONTINUED | OUTPATIENT
Start: 2025-08-09 | End: 2025-08-09 | Stop reason: HOSPADM

## 2025-08-09 RX ORDER — FUROSEMIDE 40 MG/1
40 TABLET ORAL 2 TIMES DAILY
Status: DISCONTINUED | OUTPATIENT
Start: 2025-08-09 | End: 2025-08-09 | Stop reason: HOSPADM

## 2025-08-09 RX ORDER — ACETAMINOPHEN 325 MG/1
650 TABLET ORAL EVERY 6 HOURS PRN
Status: DISCONTINUED | OUTPATIENT
Start: 2025-08-09 | End: 2025-08-09 | Stop reason: HOSPADM

## 2025-08-09 RX ORDER — DEXTROSE MONOHYDRATE 100 MG/ML
INJECTION, SOLUTION INTRAVENOUS CONTINUOUS PRN
Status: DISCONTINUED | OUTPATIENT
Start: 2025-08-09 | End: 2025-08-09 | Stop reason: HOSPADM

## 2025-08-09 RX ORDER — SACUBITRIL AND VALSARTAN 24; 26 MG/1; MG/1
1 TABLET, FILM COATED ORAL 2 TIMES DAILY
Qty: 60 TABLET | Refills: 3 | Status: ON HOLD | OUTPATIENT
Start: 2025-08-09 | End: 2025-08-16 | Stop reason: HOSPADM

## 2025-08-09 RX ORDER — POTASSIUM CHLORIDE 1500 MG/1
40 TABLET, EXTENDED RELEASE ORAL PRN
Status: DISCONTINUED | OUTPATIENT
Start: 2025-08-09 | End: 2025-08-09 | Stop reason: HOSPADM

## 2025-08-09 RX ADMIN — FUROSEMIDE 40 MG: 10 INJECTION, SOLUTION INTRAMUSCULAR; INTRAVENOUS at 10:01

## 2025-08-09 RX ADMIN — ASPIRIN 81 MG: 81 TABLET, COATED ORAL at 10:01

## 2025-08-09 RX ADMIN — POTASSIUM BICARBONATE 40 MEQ: 782 TABLET, EFFERVESCENT ORAL at 05:25

## 2025-08-09 RX ADMIN — FUROSEMIDE 40 MG: 10 INJECTION, SOLUTION INTRAMUSCULAR; INTRAVENOUS at 05:27

## 2025-08-09 RX ADMIN — HYDRALAZINE HYDROCHLORIDE 100 MG: 50 TABLET ORAL at 10:01

## 2025-08-09 RX ADMIN — ACETAMINOPHEN 650 MG: 325 TABLET ORAL at 14:38

## 2025-08-09 RX ADMIN — ROSUVASTATIN CALCIUM 10 MG: 10 TABLET, FILM COATED ORAL at 10:01

## 2025-08-09 RX ADMIN — CARVEDILOL 25 MG: 25 TABLET, FILM COATED ORAL at 10:01

## 2025-08-09 RX ADMIN — ACETAMINOPHEN 650 MG: 325 TABLET ORAL at 05:25

## 2025-08-09 ASSESSMENT — PAIN DESCRIPTION - ORIENTATION
ORIENTATION: RIGHT;LEFT;MID;LOWER
ORIENTATION: RIGHT;LEFT;MID

## 2025-08-09 ASSESSMENT — PAIN - FUNCTIONAL ASSESSMENT
PAIN_FUNCTIONAL_ASSESSMENT: 0-10
PAIN_FUNCTIONAL_ASSESSMENT: 0-10

## 2025-08-09 ASSESSMENT — PAIN SCALES - GENERAL
PAINLEVEL_OUTOF10: 8
PAINLEVEL_OUTOF10: 6

## 2025-08-09 ASSESSMENT — PAIN DESCRIPTION - DESCRIPTORS
DESCRIPTORS: ACHING;DISCOMFORT;SORE
DESCRIPTORS: ACHING;DISCOMFORT;SORE

## 2025-08-09 ASSESSMENT — PAIN DESCRIPTION - LOCATION
LOCATION: BACK
LOCATION: BACK

## 2025-08-11 LAB
EKG ATRIAL RATE: 113 BPM
EKG ATRIAL RATE: 75 BPM
EKG P AXIS: 19 DEGREES
EKG P-R INTERVAL: 216 MS
EKG Q-T INTERVAL: 436 MS
EKG Q-T INTERVAL: 524 MS
EKG QRS DURATION: 186 MS
EKG QRS DURATION: 200 MS
EKG QTC CALCULATION (BAZETT): 585 MS
EKG QTC CALCULATION (BAZETT): 598 MS
EKG R AXIS: 111 DEGREES
EKG R AXIS: 55 DEGREES
EKG T AXIS: -14 DEGREES
EKG T AXIS: -36 DEGREES
EKG VENTRICULAR RATE: 113 BPM
EKG VENTRICULAR RATE: 75 BPM

## 2025-08-11 PROCEDURE — 93010 ELECTROCARDIOGRAM REPORT: CPT | Performed by: INTERNAL MEDICINE

## 2025-08-14 ENCOUNTER — HOSPITAL ENCOUNTER (INPATIENT)
Age: 60
LOS: 2 days | Discharge: HOME | DRG: 309 | End: 2025-08-16
Attending: STUDENT IN AN ORGANIZED HEALTH CARE EDUCATION/TRAINING PROGRAM | Admitting: INTERNAL MEDICINE
Payer: MEDICARE

## 2025-08-14 ENCOUNTER — HOSPITAL ENCOUNTER (OUTPATIENT)
Dept: OTHER | Age: 60
Setting detail: THERAPIES SERIES
Discharge: HOME | End: 2025-08-14
Payer: MEDICARE

## 2025-08-14 ENCOUNTER — APPOINTMENT (OUTPATIENT)
Dept: GENERAL RADIOLOGY | Age: 60
DRG: 309 | End: 2025-08-14
Payer: MEDICARE

## 2025-08-14 VITALS
HEART RATE: 128 BPM | SYSTOLIC BLOOD PRESSURE: 68 MMHG | WEIGHT: 189.1 LBS | BODY MASS INDEX: 25.65 KG/M2 | DIASTOLIC BLOOD PRESSURE: 46 MMHG | OXYGEN SATURATION: 94 % | RESPIRATION RATE: 18 BRPM

## 2025-08-14 DIAGNOSIS — I48.91 AFIB (HCC): ICD-10-CM

## 2025-08-14 DIAGNOSIS — I95.9 HYPOTENSION, UNSPECIFIED HYPOTENSION TYPE: Primary | ICD-10-CM

## 2025-08-14 DIAGNOSIS — I48.91 ATRIAL FIBRILLATION WITH RVR (HCC): ICD-10-CM

## 2025-08-14 DIAGNOSIS — I50.9 DECOMPENSATED HEART FAILURE (HCC): ICD-10-CM

## 2025-08-14 DIAGNOSIS — I50.23 ACUTE ON CHRONIC SYSTOLIC CHF (CONGESTIVE HEART FAILURE) (HCC): ICD-10-CM

## 2025-08-14 LAB
ALBUMIN SERPL-MCNC: 3.6 G/DL (ref 3.5–5.2)
ALP SERPL-CCNC: 96 U/L (ref 40–129)
ALT SERPL-CCNC: 68 U/L (ref 0–50)
ANION GAP SERPL CALCULATED.3IONS-SCNC: 16 MMOL/L (ref 7–16)
AST SERPL-CCNC: 91 U/L (ref 0–50)
BASOPHILS # BLD: 0.03 K/UL (ref 0–0.2)
BASOPHILS NFR BLD: 1 % (ref 0–2)
BILIRUB SERPL-MCNC: 2.6 MG/DL (ref 0–1.2)
BNP SERPL-MCNC: ABNORMAL PG/ML (ref 0–125)
BUN SERPL-MCNC: 49 MG/DL (ref 6–20)
CALCIUM SERPL-MCNC: 9.3 MG/DL (ref 8.6–10)
CHLORIDE SERPL-SCNC: 101 MMOL/L (ref 98–107)
CO2 SERPL-SCNC: 22 MMOL/L (ref 22–29)
CREAT SERPL-MCNC: 3.3 MG/DL (ref 0.7–1.2)
EOSINOPHIL # BLD: 0.03 K/UL (ref 0.05–0.5)
EOSINOPHILS RELATIVE PERCENT: 1 % (ref 0–6)
ERYTHROCYTE [DISTWIDTH] IN BLOOD BY AUTOMATED COUNT: 14.6 % (ref 11.5–15)
GFR, ESTIMATED: 21 ML/MIN/1.73M2
GLUCOSE BLD-MCNC: 131 MG/DL (ref 74–99)
GLUCOSE SERPL-MCNC: 242 MG/DL (ref 74–99)
HCT VFR BLD AUTO: 36.8 % (ref 37–54)
HGB BLD-MCNC: 12.4 G/DL (ref 12.5–16.5)
IMM GRANULOCYTES # BLD AUTO: 0.03 K/UL (ref 0–0.58)
IMM GRANULOCYTES NFR BLD: 1 % (ref 0–5)
LACTATE BLDV-SCNC: 2.2 MMOL/L (ref 0.5–2.2)
LIPASE SERPL-CCNC: 27 U/L (ref 13–60)
LYMPHOCYTES NFR BLD: 0.96 K/UL (ref 1.5–4)
LYMPHOCYTES RELATIVE PERCENT: 16 % (ref 20–42)
MCH RBC QN AUTO: 30.4 PG (ref 26–35)
MCHC RBC AUTO-ENTMCNC: 33.7 G/DL (ref 32–34.5)
MCV RBC AUTO: 90.2 FL (ref 80–99.9)
MONOCYTES NFR BLD: 0.31 K/UL (ref 0.1–0.95)
MONOCYTES NFR BLD: 5 % (ref 2–12)
NEUTROPHILS NFR BLD: 78 % (ref 43–80)
NEUTS SEG NFR BLD: 4.77 K/UL (ref 1.8–7.3)
PLATELET # BLD AUTO: 178 K/UL (ref 130–450)
PMV BLD AUTO: 10.8 FL (ref 7–12)
POTASSIUM SERPL-SCNC: 4.1 MMOL/L (ref 3.5–5.1)
PROT SERPL-MCNC: 6.1 G/DL (ref 6.4–8.3)
RBC # BLD AUTO: 4.08 M/UL (ref 3.8–5.8)
SODIUM SERPL-SCNC: 139 MMOL/L (ref 136–145)
TROPONIN I SERPL HS-MCNC: 120 NG/L (ref 0–22)
TROPONIN I SERPL HS-MCNC: 126 NG/L (ref 0–22)
WBC OTHER # BLD: 6.1 K/UL (ref 4.5–11.5)

## 2025-08-14 PROCEDURE — 85025 COMPLETE CBC W/AUTO DIFF WBC: CPT

## 2025-08-14 PROCEDURE — 96375 TX/PRO/DX INJ NEW DRUG ADDON: CPT

## 2025-08-14 PROCEDURE — 80053 COMPREHEN METABOLIC PANEL: CPT

## 2025-08-14 PROCEDURE — 6370000000 HC RX 637 (ALT 250 FOR IP): Performed by: INTERNAL MEDICINE

## 2025-08-14 PROCEDURE — 96361 HYDRATE IV INFUSION ADD-ON: CPT

## 2025-08-14 PROCEDURE — 83605 ASSAY OF LACTIC ACID: CPT

## 2025-08-14 PROCEDURE — 99285 EMERGENCY DEPT VISIT HI MDM: CPT

## 2025-08-14 PROCEDURE — 84484 ASSAY OF TROPONIN QUANT: CPT

## 2025-08-14 PROCEDURE — 99215 OFFICE O/P EST HI 40 MIN: CPT

## 2025-08-14 PROCEDURE — 2500000003 HC RX 250 WO HCPCS: Performed by: INTERNAL MEDICINE

## 2025-08-14 PROCEDURE — 82962 GLUCOSE BLOOD TEST: CPT

## 2025-08-14 PROCEDURE — 93005 ELECTROCARDIOGRAM TRACING: CPT | Performed by: STUDENT IN AN ORGANIZED HEALTH CARE EDUCATION/TRAINING PROGRAM

## 2025-08-14 PROCEDURE — 96374 THER/PROPH/DIAG INJ IV PUSH: CPT

## 2025-08-14 PROCEDURE — 36415 COLL VENOUS BLD VENIPUNCTURE: CPT

## 2025-08-14 PROCEDURE — 2140000000 HC CCU INTERMEDIATE R&B

## 2025-08-14 PROCEDURE — 83690 ASSAY OF LIPASE: CPT

## 2025-08-14 PROCEDURE — 6360000002 HC RX W HCPCS: Performed by: STUDENT IN AN ORGANIZED HEALTH CARE EDUCATION/TRAINING PROGRAM

## 2025-08-14 PROCEDURE — 71045 X-RAY EXAM CHEST 1 VIEW: CPT

## 2025-08-14 PROCEDURE — 83880 ASSAY OF NATRIURETIC PEPTIDE: CPT

## 2025-08-14 PROCEDURE — 2580000003 HC RX 258: Performed by: STUDENT IN AN ORGANIZED HEALTH CARE EDUCATION/TRAINING PROGRAM

## 2025-08-14 RX ORDER — DEXTROSE MONOHYDRATE 100 MG/ML
INJECTION, SOLUTION INTRAVENOUS CONTINUOUS PRN
Status: DISCONTINUED | OUTPATIENT
Start: 2025-08-14 | End: 2025-08-16 | Stop reason: HOSPADM

## 2025-08-14 RX ORDER — SODIUM CHLORIDE 0.9 % (FLUSH) 0.9 %
5-40 SYRINGE (ML) INJECTION EVERY 12 HOURS SCHEDULED
Status: DISCONTINUED | OUTPATIENT
Start: 2025-08-14 | End: 2025-08-16 | Stop reason: HOSPADM

## 2025-08-14 RX ORDER — ROSUVASTATIN CALCIUM 5 MG/1
10 TABLET, COATED ORAL DAILY
Status: DISCONTINUED | OUTPATIENT
Start: 2025-08-15 | End: 2025-08-16 | Stop reason: HOSPADM

## 2025-08-14 RX ORDER — 0.9 % SODIUM CHLORIDE 0.9 %
500 INTRAVENOUS SOLUTION INTRAVENOUS ONCE
Status: COMPLETED | OUTPATIENT
Start: 2025-08-14 | End: 2025-08-14

## 2025-08-14 RX ORDER — INSULIN LISPRO 100 [IU]/ML
0-8 INJECTION, SOLUTION INTRAVENOUS; SUBCUTANEOUS
Status: DISCONTINUED | OUTPATIENT
Start: 2025-08-14 | End: 2025-08-16 | Stop reason: HOSPADM

## 2025-08-14 RX ORDER — FENTANYL CITRATE 50 UG/ML
25 INJECTION, SOLUTION INTRAMUSCULAR; INTRAVENOUS ONCE
Refills: 0 | Status: COMPLETED | OUTPATIENT
Start: 2025-08-14 | End: 2025-08-14

## 2025-08-14 RX ORDER — DIGOXIN 0.25 MG/ML
125 INJECTION INTRAMUSCULAR; INTRAVENOUS ONCE
Status: COMPLETED | OUTPATIENT
Start: 2025-08-14 | End: 2025-08-14

## 2025-08-14 RX ORDER — PROCHLORPERAZINE MALEATE 10 MG
10 TABLET ORAL EVERY 8 HOURS PRN
Status: DISCONTINUED | OUTPATIENT
Start: 2025-08-14 | End: 2025-08-16 | Stop reason: HOSPADM

## 2025-08-14 RX ORDER — ACETAMINOPHEN 325 MG/1
650 TABLET ORAL EVERY 6 HOURS PRN
Status: DISCONTINUED | OUTPATIENT
Start: 2025-08-14 | End: 2025-08-16 | Stop reason: HOSPADM

## 2025-08-14 RX ORDER — ONDANSETRON 2 MG/ML
4 INJECTION INTRAMUSCULAR; INTRAVENOUS EVERY 6 HOURS PRN
Status: DISCONTINUED | OUTPATIENT
Start: 2025-08-14 | End: 2025-08-14 | Stop reason: SDUPTHER

## 2025-08-14 RX ORDER — PROCHLORPERAZINE EDISYLATE 5 MG/ML
10 INJECTION INTRAMUSCULAR; INTRAVENOUS EVERY 6 HOURS PRN
Status: DISCONTINUED | OUTPATIENT
Start: 2025-08-14 | End: 2025-08-16 | Stop reason: HOSPADM

## 2025-08-14 RX ORDER — METOPROLOL TARTRATE 1 MG/ML
2.5 INJECTION, SOLUTION INTRAVENOUS ONCE
Status: COMPLETED | OUTPATIENT
Start: 2025-08-14 | End: 2025-08-14

## 2025-08-14 RX ORDER — SODIUM CHLORIDE 0.9 % (FLUSH) 0.9 %
5-40 SYRINGE (ML) INJECTION PRN
Status: DISCONTINUED | OUTPATIENT
Start: 2025-08-14 | End: 2025-08-16 | Stop reason: HOSPADM

## 2025-08-14 RX ORDER — SODIUM CHLORIDE 9 MG/ML
INJECTION, SOLUTION INTRAVENOUS PRN
Status: DISCONTINUED | OUTPATIENT
Start: 2025-08-14 | End: 2025-08-16 | Stop reason: HOSPADM

## 2025-08-14 RX ORDER — GLUCAGON 1 MG/ML
1 KIT INJECTION PRN
Status: DISCONTINUED | OUTPATIENT
Start: 2025-08-14 | End: 2025-08-16 | Stop reason: HOSPADM

## 2025-08-14 RX ORDER — ASPIRIN 81 MG/1
81 TABLET ORAL DAILY
Status: DISCONTINUED | OUTPATIENT
Start: 2025-08-15 | End: 2025-08-16 | Stop reason: HOSPADM

## 2025-08-14 RX ORDER — ONDANSETRON 4 MG/1
4 TABLET, ORALLY DISINTEGRATING ORAL EVERY 8 HOURS PRN
Status: DISCONTINUED | OUTPATIENT
Start: 2025-08-14 | End: 2025-08-14 | Stop reason: SDUPTHER

## 2025-08-14 RX ORDER — POLYETHYLENE GLYCOL 3350 17 G/17G
17 POWDER, FOR SOLUTION ORAL DAILY PRN
Status: DISCONTINUED | OUTPATIENT
Start: 2025-08-14 | End: 2025-08-16 | Stop reason: HOSPADM

## 2025-08-14 RX ORDER — ACETAMINOPHEN 650 MG/1
650 SUPPOSITORY RECTAL EVERY 6 HOURS PRN
Status: DISCONTINUED | OUTPATIENT
Start: 2025-08-14 | End: 2025-08-16 | Stop reason: HOSPADM

## 2025-08-14 RX ORDER — GLIPIZIDE 5 MG/1
5 TABLET ORAL
Status: DISCONTINUED | OUTPATIENT
Start: 2025-08-15 | End: 2025-08-16 | Stop reason: HOSPADM

## 2025-08-14 RX ADMIN — SODIUM CHLORIDE 500 ML: 9 INJECTION, SOLUTION INTRAVENOUS at 10:55

## 2025-08-14 RX ADMIN — DIGOXIN 125 MCG: 0.25 INJECTION INTRAMUSCULAR; INTRAVENOUS at 14:05

## 2025-08-14 RX ADMIN — SODIUM CHLORIDE 500 ML: 9 INJECTION, SOLUTION INTRAVENOUS at 12:01

## 2025-08-14 RX ADMIN — METOPROLOL TARTRATE 2.5 MG: 5 INJECTION INTRAVENOUS at 13:30

## 2025-08-14 RX ADMIN — FENTANYL CITRATE 25 MCG: 50 INJECTION, SOLUTION INTRAMUSCULAR; INTRAVENOUS at 13:56

## 2025-08-14 RX ADMIN — ACETAMINOPHEN 650 MG: 325 TABLET ORAL at 18:07

## 2025-08-14 RX ADMIN — SODIUM CHLORIDE, PRESERVATIVE FREE 5 ML: 5 INJECTION INTRAVENOUS at 23:06

## 2025-08-14 RX ADMIN — ACETAMINOPHEN 650 MG: 325 TABLET ORAL at 23:36

## 2025-08-14 RX ADMIN — Medication 3 MG: at 23:37

## 2025-08-14 ASSESSMENT — PATIENT HEALTH QUESTIONNAIRE - PHQ9
SUM OF ALL RESPONSES TO PHQ QUESTIONS 1-9: 0
2. FEELING DOWN, DEPRESSED OR HOPELESS: NOT AT ALL
SUM OF ALL RESPONSES TO PHQ QUESTIONS 1-9: 0
1. LITTLE INTEREST OR PLEASURE IN DOING THINGS: NOT AT ALL

## 2025-08-14 ASSESSMENT — PAIN DESCRIPTION - DESCRIPTORS: DESCRIPTORS: ACHING;DISCOMFORT

## 2025-08-14 ASSESSMENT — PAIN SCALES - GENERAL
PAINLEVEL_OUTOF10: 5
PAINLEVEL_OUTOF10: 3

## 2025-08-14 ASSESSMENT — PAIN DESCRIPTION - ORIENTATION
ORIENTATION: MID;LOWER;LEFT
ORIENTATION: MID

## 2025-08-14 ASSESSMENT — PAIN DESCRIPTION - LOCATION
LOCATION: BACK
LOCATION: BACK

## 2025-08-14 ASSESSMENT — PAIN - FUNCTIONAL ASSESSMENT
PAIN_FUNCTIONAL_ASSESSMENT: 0-10
PAIN_FUNCTIONAL_ASSESSMENT: 0-10
PAIN_FUNCTIONAL_ASSESSMENT: ACTIVITIES ARE NOT PREVENTED

## 2025-08-15 ENCOUNTER — HOSPITAL ENCOUNTER (INPATIENT)
Age: 60
Discharge: HOME | DRG: 309 | End: 2025-08-17
Attending: INTERNAL MEDICINE
Payer: MEDICARE

## 2025-08-15 ENCOUNTER — ANESTHESIA (OUTPATIENT)
Age: 60
End: 2025-08-15
Payer: MEDICARE

## 2025-08-15 ENCOUNTER — ANESTHESIA EVENT (OUTPATIENT)
Age: 60
End: 2025-08-15
Payer: MEDICARE

## 2025-08-15 VITALS
OXYGEN SATURATION: 98 % | SYSTOLIC BLOOD PRESSURE: 101 MMHG | HEART RATE: 77 BPM | TEMPERATURE: 97.8 F | RESPIRATION RATE: 17 BRPM | DIASTOLIC BLOOD PRESSURE: 74 MMHG

## 2025-08-15 PROBLEM — I48.19 PERSISTENT ATRIAL FIBRILLATION (HCC): Status: ACTIVE | Noted: 2025-08-15

## 2025-08-15 PROBLEM — Z95.810 ICD (IMPLANTABLE CARDIOVERTER-DEFIBRILLATOR), DUAL, IN SITU: Status: ACTIVE | Noted: 2025-08-15

## 2025-08-15 LAB
ANION GAP SERPL CALCULATED.3IONS-SCNC: 14 MMOL/L (ref 7–16)
BUN SERPL-MCNC: 48 MG/DL (ref 6–20)
CALCIUM SERPL-MCNC: 8.8 MG/DL (ref 8.6–10)
CHLORIDE SERPL-SCNC: 107 MMOL/L (ref 98–107)
CO2 SERPL-SCNC: 22 MMOL/L (ref 22–29)
CREAT SERPL-MCNC: 3.1 MG/DL (ref 0.7–1.2)
CREAT UR-MCNC: 170 MG/DL (ref 40–278)
CREAT UR-MCNC: 171 MG/DL (ref 40–278)
ECHO BSA: 2.08 M2
EKG ATRIAL RATE: 117 BPM
EKG Q-T INTERVAL: 406 MS
EKG QRS DURATION: 168 MS
EKG QTC CALCULATION (BAZETT): 573 MS
EKG R AXIS: 139 DEGREES
EKG T AXIS: -1 DEGREES
EKG VENTRICULAR RATE: 120 BPM
GFR, ESTIMATED: 23 ML/MIN/1.73M2
GLUCOSE BLD-MCNC: 117 MG/DL (ref 74–99)
GLUCOSE BLD-MCNC: 131 MG/DL (ref 74–99)
GLUCOSE BLD-MCNC: 56 MG/DL (ref 74–99)
GLUCOSE BLD-MCNC: 66 MG/DL (ref 74–99)
GLUCOSE BLD-MCNC: 67 MG/DL (ref 74–99)
GLUCOSE BLD-MCNC: 87 MG/DL (ref 74–99)
GLUCOSE BLD-MCNC: 93 MG/DL (ref 74–99)
GLUCOSE SERPL-MCNC: 68 MG/DL (ref 74–99)
POTASSIUM SERPL-SCNC: 3.6 MMOL/L (ref 3.5–5.1)
SODIUM SERPL-SCNC: 143 MMOL/L (ref 136–145)
SODIUM UR-SCNC: <20 MMOL/L
TOTAL PROTEIN, URINE: 44 MG/DL (ref 0–12)
URINE TOTAL PROTEIN CREATININE RATIO: 0.26 (ref 0–0.2)
UUN UR-MCNC: 910 MG/DL (ref 800–1666)

## 2025-08-15 PROCEDURE — 82962 GLUCOSE BLOOD TEST: CPT

## 2025-08-15 PROCEDURE — 3700000000 HC ANESTHESIA ATTENDED CARE: Performed by: INTERNAL MEDICINE

## 2025-08-15 PROCEDURE — 93283 PRGRMG EVAL IMPLANTABLE DFB: CPT | Performed by: INTERNAL MEDICINE

## 2025-08-15 PROCEDURE — 7100000010 HC PHASE II RECOVERY - FIRST 15 MIN: Performed by: INTERNAL MEDICINE

## 2025-08-15 PROCEDURE — 84156 ASSAY OF PROTEIN URINE: CPT

## 2025-08-15 PROCEDURE — 6360000002 HC RX W HCPCS: Performed by: NURSE ANESTHETIST, CERTIFIED REGISTERED

## 2025-08-15 PROCEDURE — 93010 ELECTROCARDIOGRAM REPORT: CPT | Performed by: INTERNAL MEDICINE

## 2025-08-15 PROCEDURE — 36415 COLL VENOUS BLD VENIPUNCTURE: CPT

## 2025-08-15 PROCEDURE — 99291 CRITICAL CARE FIRST HOUR: CPT | Performed by: INTERNAL MEDICINE

## 2025-08-15 PROCEDURE — 5A2204Z RESTORATION OF CARDIAC RHYTHM, SINGLE: ICD-10-PCS | Performed by: INTERNAL MEDICINE

## 2025-08-15 PROCEDURE — 2140000000 HC CCU INTERMEDIATE R&B

## 2025-08-15 PROCEDURE — 6370000000 HC RX 637 (ALT 250 FOR IP): Performed by: INTERNAL MEDICINE

## 2025-08-15 PROCEDURE — 7100000011 HC PHASE II RECOVERY - ADDTL 15 MIN: Performed by: INTERNAL MEDICINE

## 2025-08-15 PROCEDURE — 93289 INTERROG DEVICE EVAL HEART: CPT | Performed by: INTERNAL MEDICINE

## 2025-08-15 PROCEDURE — 2500000003 HC RX 250 WO HCPCS: Performed by: INTERNAL MEDICINE

## 2025-08-15 PROCEDURE — 92960 CARDIOVERSION ELECTRIC EXT: CPT | Performed by: INTERNAL MEDICINE

## 2025-08-15 PROCEDURE — 80048 BASIC METABOLIC PNL TOTAL CA: CPT

## 2025-08-15 PROCEDURE — 84300 ASSAY OF URINE SODIUM: CPT

## 2025-08-15 PROCEDURE — 92960 CARDIOVERSION ELECTRIC EXT: CPT

## 2025-08-15 PROCEDURE — 84540 ASSAY OF URINE/UREA-N: CPT

## 2025-08-15 PROCEDURE — 82570 ASSAY OF URINE CREATININE: CPT

## 2025-08-15 RX ORDER — PROPOFOL 10 MG/ML
INJECTION, EMULSION INTRAVENOUS
Status: DISCONTINUED | OUTPATIENT
Start: 2025-08-15 | End: 2025-08-15 | Stop reason: SDUPTHER

## 2025-08-15 RX ORDER — METOPROLOL SUCCINATE 25 MG/1
25 TABLET, EXTENDED RELEASE ORAL DAILY
Status: DISCONTINUED | OUTPATIENT
Start: 2025-08-15 | End: 2025-08-15

## 2025-08-15 RX ORDER — METOPROLOL SUCCINATE 25 MG/1
25 TABLET, EXTENDED RELEASE ORAL 2 TIMES DAILY
Status: DISCONTINUED | OUTPATIENT
Start: 2025-08-15 | End: 2025-08-16 | Stop reason: HOSPADM

## 2025-08-15 RX ORDER — SODIUM CHLORIDE 9 MG/ML
INJECTION, SOLUTION INTRAVENOUS PRN
Status: DISCONTINUED | OUTPATIENT
Start: 2025-08-15 | End: 2025-08-16 | Stop reason: HOSPADM

## 2025-08-15 RX ORDER — SODIUM CHLORIDE 0.9 % (FLUSH) 0.9 %
5-40 SYRINGE (ML) INJECTION EVERY 12 HOURS SCHEDULED
Status: DISCONTINUED | OUTPATIENT
Start: 2025-08-15 | End: 2025-08-16 | Stop reason: HOSPADM

## 2025-08-15 RX ORDER — HYDRALAZINE HYDROCHLORIDE 10 MG/1
10 TABLET, FILM COATED ORAL EVERY 8 HOURS SCHEDULED
Status: DISCONTINUED | OUTPATIENT
Start: 2025-08-15 | End: 2025-08-16 | Stop reason: HOSPADM

## 2025-08-15 RX ORDER — SODIUM CHLORIDE 0.9 % (FLUSH) 0.9 %
5-40 SYRINGE (ML) INJECTION PRN
Status: DISCONTINUED | OUTPATIENT
Start: 2025-08-15 | End: 2025-08-16 | Stop reason: HOSPADM

## 2025-08-15 RX ADMIN — SODIUM CHLORIDE, PRESERVATIVE FREE 10 ML: 5 INJECTION INTRAVENOUS at 22:10

## 2025-08-15 RX ADMIN — SODIUM CHLORIDE, PRESERVATIVE FREE 10 ML: 5 INJECTION INTRAVENOUS at 22:09

## 2025-08-15 RX ADMIN — Medication 16 G: at 07:40

## 2025-08-15 RX ADMIN — Medication 16 G: at 06:53

## 2025-08-15 RX ADMIN — ROSUVASTATIN CALCIUM 10 MG: 5 TABLET, FILM COATED ORAL at 07:44

## 2025-08-15 RX ADMIN — ASPIRIN 81 MG: 81 TABLET, COATED ORAL at 07:44

## 2025-08-15 RX ADMIN — PROPOFOL 70 MG: 10 INJECTION, EMULSION INTRAVENOUS at 15:19

## 2025-08-15 RX ADMIN — METOPROLOL SUCCINATE 25 MG: 25 TABLET, EXTENDED RELEASE ORAL at 21:50

## 2025-08-15 RX ADMIN — HYDRALAZINE HYDROCHLORIDE 10 MG: 10 TABLET ORAL at 13:54

## 2025-08-15 RX ADMIN — RIVAROXABAN 15 MG: 15 TABLET, FILM COATED ORAL at 15:08

## 2025-08-15 RX ADMIN — Medication 16 G: at 12:02

## 2025-08-15 RX ADMIN — SODIUM CHLORIDE, PRESERVATIVE FREE 10 ML: 5 INJECTION INTRAVENOUS at 07:48

## 2025-08-15 ASSESSMENT — PAIN SCALES - GENERAL
PAINLEVEL_OUTOF10: 0
PAINLEVEL_OUTOF10: 2
PAINLEVEL_OUTOF10: 0
PAINLEVEL_OUTOF10: 0

## 2025-08-15 ASSESSMENT — PAIN - FUNCTIONAL ASSESSMENT: PAIN_FUNCTIONAL_ASSESSMENT: 0-10

## 2025-08-16 VITALS
HEIGHT: 72 IN | OXYGEN SATURATION: 100 % | BODY MASS INDEX: 25.47 KG/M2 | WEIGHT: 188.05 LBS | DIASTOLIC BLOOD PRESSURE: 91 MMHG | RESPIRATION RATE: 18 BRPM | SYSTOLIC BLOOD PRESSURE: 113 MMHG | TEMPERATURE: 97.7 F | HEART RATE: 70 BPM

## 2025-08-16 LAB
ALBUMIN SERPL-MCNC: 3.2 G/DL (ref 3.5–5.2)
ALP SERPL-CCNC: 76 U/L (ref 40–129)
ALT SERPL-CCNC: 50 U/L (ref 0–50)
ANION GAP SERPL CALCULATED.3IONS-SCNC: 15 MMOL/L (ref 7–16)
AST SERPL-CCNC: 30 U/L (ref 0–50)
BILIRUB SERPL-MCNC: 1.2 MG/DL (ref 0–1.2)
BUN SERPL-MCNC: 41 MG/DL (ref 6–20)
CALCIUM SERPL-MCNC: 8.7 MG/DL (ref 8.6–10)
CHLORIDE SERPL-SCNC: 105 MMOL/L (ref 98–107)
CO2 SERPL-SCNC: 22 MMOL/L (ref 22–29)
CREAT SERPL-MCNC: 2.6 MG/DL (ref 0.7–1.2)
EKG ATRIAL RATE: 69 BPM
EKG ATRIAL RATE: 70 BPM
EKG P AXIS: 4 DEGREES
EKG P AXIS: 80 DEGREES
EKG P-R INTERVAL: 234 MS
EKG P-R INTERVAL: 244 MS
EKG Q-T INTERVAL: 412 MS
EKG Q-T INTERVAL: 422 MS
EKG QRS DURATION: 144 MS
EKG QRS DURATION: 144 MS
EKG QTC CALCULATION (BAZETT): 444 MS
EKG QTC CALCULATION (BAZETT): 490 MS
EKG R AXIS: 106 DEGREES
EKG R AXIS: 120 DEGREES
EKG T AXIS: 132 DEGREES
EKG T AXIS: 18 DEGREES
EKG VENTRICULAR RATE: 70 BPM
EKG VENTRICULAR RATE: 81 BPM
ERYTHROCYTE [DISTWIDTH] IN BLOOD BY AUTOMATED COUNT: 14.5 % (ref 11.5–15)
GFR, ESTIMATED: 28 ML/MIN/1.73M2
GLUCOSE BLD-MCNC: 122 MG/DL (ref 74–99)
GLUCOSE BLD-MCNC: 95 MG/DL (ref 74–99)
GLUCOSE SERPL-MCNC: 55 MG/DL (ref 74–99)
HCT VFR BLD AUTO: 38.1 % (ref 37–54)
HGB BLD-MCNC: 12.2 G/DL (ref 12.5–16.5)
MAGNESIUM SERPL-MCNC: 2.1 MG/DL (ref 1.6–2.6)
MCH RBC QN AUTO: 29.8 PG (ref 26–35)
MCHC RBC AUTO-ENTMCNC: 32 G/DL (ref 32–34.5)
MCV RBC AUTO: 92.9 FL (ref 80–99.9)
PHOSPHATE SERPL-MCNC: 4.5 MG/DL (ref 2.5–4.5)
PLATELET # BLD AUTO: 187 K/UL (ref 130–450)
PMV BLD AUTO: 10.5 FL (ref 7–12)
POTASSIUM SERPL-SCNC: 3.5 MMOL/L (ref 3.5–5.1)
PROT SERPL-MCNC: 5.7 G/DL (ref 6.4–8.3)
RBC # BLD AUTO: 4.1 M/UL (ref 3.8–5.8)
SODIUM SERPL-SCNC: 142 MMOL/L (ref 136–145)
WBC OTHER # BLD: 4.6 K/UL (ref 4.5–11.5)

## 2025-08-16 PROCEDURE — 84100 ASSAY OF PHOSPHORUS: CPT

## 2025-08-16 PROCEDURE — 99233 SBSQ HOSP IP/OBS HIGH 50: CPT | Performed by: INTERNAL MEDICINE

## 2025-08-16 PROCEDURE — 6370000000 HC RX 637 (ALT 250 FOR IP): Performed by: INTERNAL MEDICINE

## 2025-08-16 PROCEDURE — 2500000003 HC RX 250 WO HCPCS: Performed by: INTERNAL MEDICINE

## 2025-08-16 PROCEDURE — 80053 COMPREHEN METABOLIC PANEL: CPT

## 2025-08-16 PROCEDURE — 82962 GLUCOSE BLOOD TEST: CPT

## 2025-08-16 PROCEDURE — 85027 COMPLETE CBC AUTOMATED: CPT

## 2025-08-16 PROCEDURE — 83735 ASSAY OF MAGNESIUM: CPT

## 2025-08-16 PROCEDURE — 36415 COLL VENOUS BLD VENIPUNCTURE: CPT

## 2025-08-16 RX ORDER — METOPROLOL SUCCINATE 25 MG/1
25 TABLET, EXTENDED RELEASE ORAL 2 TIMES DAILY
Qty: 60 TABLET | Refills: 0 | Status: SHIPPED | OUTPATIENT
Start: 2025-08-16

## 2025-08-16 RX ORDER — FUROSEMIDE 40 MG/1
40 TABLET ORAL DAILY
Qty: 30 TABLET | Refills: 0 | Status: ON HOLD
Start: 2025-08-16 | End: 2025-08-31 | Stop reason: HOSPADM

## 2025-08-16 RX ORDER — POTASSIUM CHLORIDE 1500 MG/1
40 TABLET, EXTENDED RELEASE ORAL ONCE
Status: COMPLETED | OUTPATIENT
Start: 2025-08-16 | End: 2025-08-16

## 2025-08-16 RX ORDER — POTASSIUM CHLORIDE 1500 MG/1
20 TABLET, EXTENDED RELEASE ORAL DAILY
Qty: 30 TABLET | Refills: 0
Start: 2025-08-16

## 2025-08-16 RX ORDER — HYDRALAZINE HYDROCHLORIDE 10 MG/1
10 TABLET, FILM COATED ORAL 2 TIMES DAILY
Qty: 60 TABLET | Refills: 0 | Status: ON HOLD | OUTPATIENT
Start: 2025-08-16 | End: 2025-08-31 | Stop reason: HOSPADM

## 2025-08-16 RX ADMIN — ROSUVASTATIN CALCIUM 10 MG: 5 TABLET, FILM COATED ORAL at 08:55

## 2025-08-16 RX ADMIN — ACETAMINOPHEN 650 MG: 325 TABLET ORAL at 13:42

## 2025-08-16 RX ADMIN — RIVAROXABAN 15 MG: 15 TABLET, FILM COATED ORAL at 08:56

## 2025-08-16 RX ADMIN — SODIUM CHLORIDE, PRESERVATIVE FREE 10 ML: 5 INJECTION INTRAVENOUS at 08:57

## 2025-08-16 RX ADMIN — ASPIRIN 81 MG: 81 TABLET, COATED ORAL at 08:55

## 2025-08-16 RX ADMIN — POTASSIUM CHLORIDE 40 MEQ: 1500 TABLET, EXTENDED RELEASE ORAL at 10:58

## 2025-08-16 RX ADMIN — METOPROLOL SUCCINATE 25 MG: 25 TABLET, EXTENDED RELEASE ORAL at 08:56

## 2025-08-16 RX ADMIN — ACETAMINOPHEN 650 MG: 325 TABLET ORAL at 01:33

## 2025-08-16 ASSESSMENT — PAIN SCALES - GENERAL
PAINLEVEL_OUTOF10: 3
PAINLEVEL_OUTOF10: 2
PAINLEVEL_OUTOF10: 5

## 2025-08-16 ASSESSMENT — PAIN DESCRIPTION - ORIENTATION: ORIENTATION: MID

## 2025-08-16 ASSESSMENT — PAIN DESCRIPTION - LOCATION: LOCATION: GENERALIZED

## 2025-08-16 ASSESSMENT — PAIN - FUNCTIONAL ASSESSMENT
PAIN_FUNCTIONAL_ASSESSMENT: ACTIVITIES ARE NOT PREVENTED
PAIN_FUNCTIONAL_ASSESSMENT: 0-10

## 2025-08-16 ASSESSMENT — PAIN DESCRIPTION - DESCRIPTORS: DESCRIPTORS: ACHING;DISCOMFORT;DULL

## 2025-08-28 ENCOUNTER — APPOINTMENT (OUTPATIENT)
Dept: GENERAL RADIOLOGY | Age: 60
DRG: 291 | End: 2025-08-28
Payer: MEDICARE

## 2025-08-28 ENCOUNTER — HOSPITAL ENCOUNTER (INPATIENT)
Age: 60
LOS: 3 days | Discharge: ANOTHER HOSPITAL | DRG: 291 | End: 2025-08-31
Attending: EMERGENCY MEDICINE | Admitting: INTERNAL MEDICINE
Payer: MEDICARE

## 2025-08-28 DIAGNOSIS — I50.23 ACUTE ON CHRONIC SYSTOLIC CONGESTIVE HEART FAILURE (HCC): ICD-10-CM

## 2025-08-28 DIAGNOSIS — I50.9 CHRONIC CONGESTIVE HEART FAILURE, UNSPECIFIED HEART FAILURE TYPE (HCC): Primary | ICD-10-CM

## 2025-08-28 LAB
ALBUMIN SERPL-MCNC: 3.5 G/DL (ref 3.5–5.2)
ALP SERPL-CCNC: 75 U/L (ref 40–129)
ALT SERPL-CCNC: 19 U/L (ref 0–50)
ANION GAP SERPL CALCULATED.3IONS-SCNC: 14 MMOL/L (ref 7–16)
AST SERPL-CCNC: 18 U/L (ref 0–50)
BASOPHILS # BLD: 0.05 K/UL (ref 0–0.2)
BASOPHILS NFR BLD: 1 % (ref 0–2)
BILIRUB SERPL-MCNC: 2.3 MG/DL (ref 0–1.2)
BNP SERPL-MCNC: ABNORMAL PG/ML (ref 0–125)
BUN SERPL-MCNC: 37 MG/DL (ref 6–20)
CALCIUM SERPL-MCNC: 9 MG/DL (ref 8.6–10)
CHLORIDE SERPL-SCNC: 105 MMOL/L (ref 98–107)
CO2 SERPL-SCNC: 24 MMOL/L (ref 22–29)
CREAT SERPL-MCNC: 2.1 MG/DL (ref 0.7–1.2)
EOSINOPHIL # BLD: 0.07 K/UL (ref 0.05–0.5)
EOSINOPHILS RELATIVE PERCENT: 1 % (ref 0–6)
ERYTHROCYTE [DISTWIDTH] IN BLOOD BY AUTOMATED COUNT: 14.9 % (ref 11.5–15)
GFR, ESTIMATED: 37 ML/MIN/1.73M2
GLUCOSE SERPL-MCNC: 262 MG/DL (ref 74–99)
HCT VFR BLD AUTO: 39.1 % (ref 37–54)
HGB BLD-MCNC: 13 G/DL (ref 12.5–16.5)
IMM GRANULOCYTES # BLD AUTO: <0.03 K/UL (ref 0–0.58)
IMM GRANULOCYTES NFR BLD: 0 % (ref 0–5)
LYMPHOCYTES NFR BLD: 1.12 K/UL (ref 1.5–4)
LYMPHOCYTES RELATIVE PERCENT: 22 % (ref 20–42)
MAGNESIUM SERPL-MCNC: 1.8 MG/DL (ref 1.6–2.6)
MCH RBC QN AUTO: 29.7 PG (ref 26–35)
MCHC RBC AUTO-ENTMCNC: 33.2 G/DL (ref 32–34.5)
MCV RBC AUTO: 89.5 FL (ref 80–99.9)
MONOCYTES NFR BLD: 0.31 K/UL (ref 0.1–0.95)
MONOCYTES NFR BLD: 6 % (ref 2–12)
NEUTROPHILS NFR BLD: 70 % (ref 43–80)
NEUTS SEG NFR BLD: 3.63 K/UL (ref 1.8–7.3)
PLATELET # BLD AUTO: 174 K/UL (ref 130–450)
PMV BLD AUTO: 10.6 FL (ref 7–12)
POTASSIUM SERPL-SCNC: 3 MMOL/L (ref 3.5–5.1)
PROT SERPL-MCNC: 6.3 G/DL (ref 6.4–8.3)
RBC # BLD AUTO: 4.37 M/UL (ref 3.8–5.8)
SODIUM SERPL-SCNC: 143 MMOL/L (ref 136–145)
TROPONIN I SERPL HS-MCNC: 38 NG/L (ref 0–22)
TROPONIN I SERPL HS-MCNC: 40 NG/L (ref 0–22)
WBC OTHER # BLD: 5.2 K/UL (ref 4.5–11.5)

## 2025-08-28 PROCEDURE — 6360000002 HC RX W HCPCS: Performed by: EMERGENCY MEDICINE

## 2025-08-28 PROCEDURE — 96374 THER/PROPH/DIAG INJ IV PUSH: CPT

## 2025-08-28 PROCEDURE — 99285 EMERGENCY DEPT VISIT HI MDM: CPT

## 2025-08-28 PROCEDURE — 93295 DEV INTERROG REMOTE 1/2/MLT: CPT | Performed by: INTERNAL MEDICINE

## 2025-08-28 PROCEDURE — 71045 X-RAY EXAM CHEST 1 VIEW: CPT

## 2025-08-28 PROCEDURE — 93005 ELECTROCARDIOGRAM TRACING: CPT | Performed by: EMERGENCY MEDICINE

## 2025-08-28 PROCEDURE — 2060000000 HC ICU INTERMEDIATE R&B

## 2025-08-28 PROCEDURE — 84484 ASSAY OF TROPONIN QUANT: CPT

## 2025-08-28 PROCEDURE — 80053 COMPREHEN METABOLIC PANEL: CPT

## 2025-08-28 PROCEDURE — 6370000000 HC RX 637 (ALT 250 FOR IP): Performed by: NURSE PRACTITIONER

## 2025-08-28 PROCEDURE — 85025 COMPLETE CBC W/AUTO DIFF WBC: CPT

## 2025-08-28 PROCEDURE — 2500000003 HC RX 250 WO HCPCS: Performed by: NURSE PRACTITIONER

## 2025-08-28 PROCEDURE — 83880 ASSAY OF NATRIURETIC PEPTIDE: CPT

## 2025-08-28 PROCEDURE — 93296 REM INTERROG EVL PM/IDS: CPT | Performed by: INTERNAL MEDICINE

## 2025-08-28 PROCEDURE — 83735 ASSAY OF MAGNESIUM: CPT

## 2025-08-28 RX ORDER — CARVEDILOL 25 MG/1
25 TABLET ORAL 2 TIMES DAILY WITH MEALS
Status: ON HOLD | COMMUNITY
End: 2025-08-31 | Stop reason: HOSPADM

## 2025-08-28 RX ORDER — SODIUM CHLORIDE 0.9 % (FLUSH) 0.9 %
10 SYRINGE (ML) INJECTION EVERY 12 HOURS SCHEDULED
Status: DISCONTINUED | OUTPATIENT
Start: 2025-08-28 | End: 2025-08-31 | Stop reason: HOSPADM

## 2025-08-28 RX ORDER — POTASSIUM CHLORIDE 1500 MG/1
20 TABLET, EXTENDED RELEASE ORAL DAILY
Status: DISCONTINUED | OUTPATIENT
Start: 2025-08-28 | End: 2025-08-31

## 2025-08-28 RX ORDER — FUROSEMIDE 40 MG/1
40 TABLET ORAL DAILY
Status: DISCONTINUED | OUTPATIENT
Start: 2025-08-29 | End: 2025-08-31 | Stop reason: HOSPADM

## 2025-08-28 RX ORDER — POTASSIUM CHLORIDE 1500 MG/1
40 TABLET, EXTENDED RELEASE ORAL PRN
Status: DISCONTINUED | OUTPATIENT
Start: 2025-08-28 | End: 2025-08-31

## 2025-08-28 RX ORDER — FUROSEMIDE 10 MG/ML
40 INJECTION INTRAMUSCULAR; INTRAVENOUS ONCE
Status: COMPLETED | OUTPATIENT
Start: 2025-08-28 | End: 2025-08-28

## 2025-08-28 RX ORDER — ONDANSETRON 2 MG/ML
4 INJECTION INTRAMUSCULAR; INTRAVENOUS EVERY 6 HOURS PRN
Status: DISCONTINUED | OUTPATIENT
Start: 2025-08-28 | End: 2025-08-31 | Stop reason: HOSPADM

## 2025-08-28 RX ORDER — MAGNESIUM SULFATE IN WATER 40 MG/ML
2000 INJECTION, SOLUTION INTRAVENOUS PRN
Status: DISCONTINUED | OUTPATIENT
Start: 2025-08-28 | End: 2025-08-31 | Stop reason: HOSPADM

## 2025-08-28 RX ORDER — SODIUM CHLORIDE 0.9 % (FLUSH) 0.9 %
10 SYRINGE (ML) INJECTION PRN
Status: DISCONTINUED | OUTPATIENT
Start: 2025-08-28 | End: 2025-08-31 | Stop reason: HOSPADM

## 2025-08-28 RX ORDER — POTASSIUM CHLORIDE 7.45 MG/ML
10 INJECTION INTRAVENOUS PRN
Status: DISCONTINUED | OUTPATIENT
Start: 2025-08-28 | End: 2025-08-31

## 2025-08-28 RX ORDER — ACETAMINOPHEN 325 MG/1
650 TABLET ORAL EVERY 6 HOURS PRN
Status: DISCONTINUED | OUTPATIENT
Start: 2025-08-28 | End: 2025-08-31 | Stop reason: HOSPADM

## 2025-08-28 RX ORDER — HYDRALAZINE HYDROCHLORIDE 10 MG/1
10 TABLET, FILM COATED ORAL 2 TIMES DAILY
Status: DISCONTINUED | OUTPATIENT
Start: 2025-08-28 | End: 2025-08-29

## 2025-08-28 RX ORDER — ACETAMINOPHEN 650 MG/1
650 SUPPOSITORY RECTAL EVERY 6 HOURS PRN
Status: DISCONTINUED | OUTPATIENT
Start: 2025-08-28 | End: 2025-08-31 | Stop reason: HOSPADM

## 2025-08-28 RX ORDER — SODIUM CHLORIDE 9 MG/ML
INJECTION, SOLUTION INTRAVENOUS PRN
Status: DISCONTINUED | OUTPATIENT
Start: 2025-08-28 | End: 2025-08-31 | Stop reason: HOSPADM

## 2025-08-28 RX ORDER — METOPROLOL SUCCINATE 25 MG/1
25 TABLET, EXTENDED RELEASE ORAL 2 TIMES DAILY
Status: DISCONTINUED | OUTPATIENT
Start: 2025-08-28 | End: 2025-08-29

## 2025-08-28 RX ORDER — ROSUVASTATIN CALCIUM 10 MG/1
10 TABLET, COATED ORAL DAILY
Status: DISCONTINUED | OUTPATIENT
Start: 2025-08-28 | End: 2025-08-31 | Stop reason: HOSPADM

## 2025-08-28 RX ORDER — SENNOSIDES 8.6 MG/1
1 TABLET ORAL DAILY PRN
Status: DISCONTINUED | OUTPATIENT
Start: 2025-08-28 | End: 2025-08-31 | Stop reason: HOSPADM

## 2025-08-28 RX ORDER — ASPIRIN 81 MG/1
81 TABLET ORAL DAILY
Status: DISCONTINUED | OUTPATIENT
Start: 2025-08-28 | End: 2025-08-31 | Stop reason: HOSPADM

## 2025-08-28 RX ORDER — ONDANSETRON 4 MG/1
4 TABLET, ORALLY DISINTEGRATING ORAL EVERY 8 HOURS PRN
Status: DISCONTINUED | OUTPATIENT
Start: 2025-08-28 | End: 2025-08-31 | Stop reason: HOSPADM

## 2025-08-28 RX ADMIN — SODIUM CHLORIDE, PRESERVATIVE FREE 10 ML: 5 INJECTION INTRAVENOUS at 21:17

## 2025-08-28 RX ADMIN — FUROSEMIDE 40 MG: 10 INJECTION, SOLUTION INTRAMUSCULAR; INTRAVENOUS at 18:39

## 2025-08-28 RX ADMIN — Medication 3 MG: at 23:23

## 2025-08-28 RX ADMIN — ASPIRIN 81 MG: 81 TABLET, COATED ORAL at 21:17

## 2025-08-28 RX ADMIN — ACETAMINOPHEN 650 MG: 325 TABLET ORAL at 23:23

## 2025-08-28 ASSESSMENT — PAIN DESCRIPTION - LOCATION: LOCATION: CHEST

## 2025-08-28 ASSESSMENT — PAIN SCALES - GENERAL
PAINLEVEL_OUTOF10: 0
PAINLEVEL_OUTOF10: 3
PAINLEVEL_OUTOF10: 0

## 2025-08-28 ASSESSMENT — PAIN - FUNCTIONAL ASSESSMENT: PAIN_FUNCTIONAL_ASSESSMENT: 0-10

## 2025-08-28 ASSESSMENT — PAIN DESCRIPTION - DESCRIPTORS: DESCRIPTORS: DISCOMFORT;ACHING;SORE

## 2025-08-29 PROBLEM — N18.32 CKD STAGE 3B, GFR 30-44 ML/MIN (HCC): Status: ACTIVE | Noted: 2025-08-29

## 2025-08-29 PROBLEM — E87.6 HYPOKALEMIA: Status: ACTIVE | Noted: 2025-08-29

## 2025-08-29 LAB
ALBUMIN SERPL-MCNC: 3.6 G/DL (ref 3.5–5.2)
ALP SERPL-CCNC: 74 U/L (ref 40–129)
ALT SERPL-CCNC: 17 U/L (ref 0–50)
ANION GAP SERPL CALCULATED.3IONS-SCNC: 12 MMOL/L (ref 7–16)
ANION GAP SERPL CALCULATED.3IONS-SCNC: 16 MMOL/L (ref 7–16)
AST SERPL-CCNC: 18 U/L (ref 0–50)
BASOPHILS # BLD: 0.04 K/UL (ref 0–0.2)
BASOPHILS NFR BLD: 1 % (ref 0–2)
BILIRUB SERPL-MCNC: 2 MG/DL (ref 0–1.2)
BUN SERPL-MCNC: 34 MG/DL (ref 6–20)
BUN SERPL-MCNC: 36 MG/DL (ref 6–20)
CALCIUM SERPL-MCNC: 7 MG/DL (ref 8.6–10)
CALCIUM SERPL-MCNC: 9.2 MG/DL (ref 8.6–10)
CHLORIDE SERPL-SCNC: 103 MMOL/L (ref 98–107)
CHLORIDE SERPL-SCNC: 112 MMOL/L (ref 98–107)
CO2 SERPL-SCNC: 19 MMOL/L (ref 22–29)
CO2 SERPL-SCNC: 23 MMOL/L (ref 22–29)
CREAT SERPL-MCNC: 1.8 MG/DL (ref 0.7–1.2)
CREAT SERPL-MCNC: 2.1 MG/DL (ref 0.7–1.2)
EKG ATRIAL RATE: 375 BPM
EKG Q-T INTERVAL: 448 MS
EKG QRS DURATION: 154 MS
EKG QTC CALCULATION (BAZETT): 526 MS
EKG R AXIS: -54 DEGREES
EKG T AXIS: 153 DEGREES
EKG VENTRICULAR RATE: 83 BPM
EOSINOPHIL # BLD: 0.08 K/UL (ref 0.05–0.5)
EOSINOPHILS RELATIVE PERCENT: 2 % (ref 0–6)
ERYTHROCYTE [DISTWIDTH] IN BLOOD BY AUTOMATED COUNT: 14.8 % (ref 11.5–15)
GFR, ESTIMATED: 35 ML/MIN/1.73M2
GFR, ESTIMATED: 44 ML/MIN/1.73M2
GLUCOSE SERPL-MCNC: 142 MG/DL (ref 74–99)
GLUCOSE SERPL-MCNC: 179 MG/DL (ref 74–99)
HCT VFR BLD AUTO: 39 % (ref 37–54)
HGB BLD-MCNC: 12.8 G/DL (ref 12.5–16.5)
IMM GRANULOCYTES # BLD AUTO: <0.03 K/UL (ref 0–0.58)
IMM GRANULOCYTES NFR BLD: 0 % (ref 0–5)
LYMPHOCYTES NFR BLD: 1.36 K/UL (ref 1.5–4)
LYMPHOCYTES RELATIVE PERCENT: 30 % (ref 20–42)
MAGNESIUM SERPL-MCNC: 1.7 MG/DL (ref 1.6–2.6)
MCH RBC QN AUTO: 29.6 PG (ref 26–35)
MCHC RBC AUTO-ENTMCNC: 32.8 G/DL (ref 32–34.5)
MCV RBC AUTO: 90.1 FL (ref 80–99.9)
MONOCYTES NFR BLD: 0.31 K/UL (ref 0.1–0.95)
MONOCYTES NFR BLD: 7 % (ref 2–12)
NEUTROPHILS NFR BLD: 60 % (ref 43–80)
NEUTS SEG NFR BLD: 2.71 K/UL (ref 1.8–7.3)
PLATELET # BLD AUTO: 173 K/UL (ref 130–450)
PMV BLD AUTO: 10.5 FL (ref 7–12)
POTASSIUM SERPL-SCNC: 2.8 MMOL/L (ref 3.5–5.1)
POTASSIUM SERPL-SCNC: 3 MMOL/L (ref 3.5–5.1)
PROT SERPL-MCNC: 6.4 G/DL (ref 6.4–8.3)
RBC # BLD AUTO: 4.33 M/UL (ref 3.8–5.8)
SODIUM SERPL-SCNC: 142 MMOL/L (ref 136–145)
SODIUM SERPL-SCNC: 143 MMOL/L (ref 136–145)
WBC OTHER # BLD: 4.5 K/UL (ref 4.5–11.5)

## 2025-08-29 PROCEDURE — 2580000003 HC RX 258: Performed by: NURSE PRACTITIONER

## 2025-08-29 PROCEDURE — 83735 ASSAY OF MAGNESIUM: CPT

## 2025-08-29 PROCEDURE — APPSS60 APP SPLIT SHARED TIME 46-60 MINUTES: Performed by: NURSE PRACTITIONER

## 2025-08-29 PROCEDURE — 2060000000 HC ICU INTERMEDIATE R&B

## 2025-08-29 PROCEDURE — 80048 BASIC METABOLIC PNL TOTAL CA: CPT

## 2025-08-29 PROCEDURE — 6370000000 HC RX 637 (ALT 250 FOR IP): Performed by: INTERNAL MEDICINE

## 2025-08-29 PROCEDURE — 99223 1ST HOSP IP/OBS HIGH 75: CPT | Performed by: INTERNAL MEDICINE

## 2025-08-29 PROCEDURE — 2500000003 HC RX 250 WO HCPCS: Performed by: NURSE PRACTITIONER

## 2025-08-29 PROCEDURE — 93010 ELECTROCARDIOGRAM REPORT: CPT | Performed by: INTERNAL MEDICINE

## 2025-08-29 PROCEDURE — 6370000000 HC RX 637 (ALT 250 FOR IP): Performed by: NURSE PRACTITIONER

## 2025-08-29 PROCEDURE — 85025 COMPLETE CBC W/AUTO DIFF WBC: CPT

## 2025-08-29 PROCEDURE — 80053 COMPREHEN METABOLIC PANEL: CPT

## 2025-08-29 PROCEDURE — 6360000002 HC RX W HCPCS: Performed by: NURSE PRACTITIONER

## 2025-08-29 RX ORDER — METOPROLOL SUCCINATE 25 MG/1
25 TABLET, EXTENDED RELEASE ORAL 2 TIMES DAILY
Status: DISCONTINUED | OUTPATIENT
Start: 2025-08-29 | End: 2025-08-31 | Stop reason: HOSPADM

## 2025-08-29 RX ORDER — POTASSIUM CHLORIDE 7.45 MG/ML
10 INJECTION INTRAVENOUS
Status: DISCONTINUED | OUTPATIENT
Start: 2025-08-29 | End: 2025-08-29

## 2025-08-29 RX ORDER — ACETAZOLAMIDE 500 MG/5ML
250 INJECTION, POWDER, LYOPHILIZED, FOR SOLUTION INTRAVENOUS ONCE
Status: COMPLETED | OUTPATIENT
Start: 2025-08-29 | End: 2025-08-29

## 2025-08-29 RX ORDER — POTASSIUM CHLORIDE 1500 MG/1
40 TABLET, EXTENDED RELEASE ORAL ONCE
Status: COMPLETED | OUTPATIENT
Start: 2025-08-29 | End: 2025-08-29

## 2025-08-29 RX ORDER — FUROSEMIDE 10 MG/ML
40 INJECTION INTRAMUSCULAR; INTRAVENOUS 2 TIMES DAILY
Status: DISCONTINUED | OUTPATIENT
Start: 2025-08-29 | End: 2025-08-29

## 2025-08-29 RX ORDER — BUMETANIDE 0.25 MG/ML
1 INJECTION, SOLUTION INTRAMUSCULAR; INTRAVENOUS 2 TIMES DAILY
Status: DISCONTINUED | OUTPATIENT
Start: 2025-08-29 | End: 2025-08-31 | Stop reason: HOSPADM

## 2025-08-29 RX ORDER — MAGNESIUM SULFATE IN WATER 40 MG/ML
2000 INJECTION, SOLUTION INTRAVENOUS ONCE
Status: COMPLETED | OUTPATIENT
Start: 2025-08-29 | End: 2025-08-29

## 2025-08-29 RX ORDER — POTASSIUM CHLORIDE 1500 MG/1
40 TABLET, EXTENDED RELEASE ORAL ONCE
Status: DISCONTINUED | OUTPATIENT
Start: 2025-08-29 | End: 2025-08-29

## 2025-08-29 RX ORDER — HYDRALAZINE HYDROCHLORIDE 10 MG/1
10 TABLET, FILM COATED ORAL EVERY 8 HOURS SCHEDULED
Status: DISCONTINUED | OUTPATIENT
Start: 2025-08-29 | End: 2025-08-31 | Stop reason: HOSPADM

## 2025-08-29 RX ORDER — POTASSIUM CHLORIDE 1500 MG/1
40 TABLET, EXTENDED RELEASE ORAL 2 TIMES DAILY
Status: DISCONTINUED | OUTPATIENT
Start: 2025-08-29 | End: 2025-08-31

## 2025-08-29 RX ORDER — ISOSORBIDE DINITRATE 10 MG/1
5 TABLET ORAL 3 TIMES DAILY
Status: DISCONTINUED | OUTPATIENT
Start: 2025-08-29 | End: 2025-08-31 | Stop reason: HOSPADM

## 2025-08-29 RX ADMIN — POTASSIUM CHLORIDE 20 MEQ: 1500 TABLET, EXTENDED RELEASE ORAL at 08:03

## 2025-08-29 RX ADMIN — POTASSIUM CHLORIDE 40 MEQ: 1500 TABLET, EXTENDED RELEASE ORAL at 20:53

## 2025-08-29 RX ADMIN — MAGNESIUM SULFATE HEPTAHYDRATE 2000 MG: 40 INJECTION, SOLUTION INTRAVENOUS at 14:58

## 2025-08-29 RX ADMIN — Medication 3 MG: at 22:41

## 2025-08-29 RX ADMIN — ROSUVASTATIN CALCIUM 10 MG: 10 TABLET, FILM COATED ORAL at 08:01

## 2025-08-29 RX ADMIN — HYDRALAZINE HYDROCHLORIDE 10 MG: 10 TABLET ORAL at 22:40

## 2025-08-29 RX ADMIN — ASPIRIN 81 MG: 81 TABLET, COATED ORAL at 08:01

## 2025-08-29 RX ADMIN — ISOSORBIDE DINITRATE 5 MG: 10 TABLET ORAL at 16:17

## 2025-08-29 RX ADMIN — SODIUM CHLORIDE, PRESERVATIVE FREE 10 ML: 5 INJECTION INTRAVENOUS at 20:56

## 2025-08-29 RX ADMIN — RIVAROXABAN 15 MG: 15 TABLET, FILM COATED ORAL at 17:41

## 2025-08-29 RX ADMIN — METOPROLOL SUCCINATE 25 MG: 25 TABLET, EXTENDED RELEASE ORAL at 22:41

## 2025-08-29 RX ADMIN — FUROSEMIDE 40 MG: 40 TABLET ORAL at 08:01

## 2025-08-29 RX ADMIN — AMIODARONE HYDROCHLORIDE 150 MG: 50 INJECTION, SOLUTION INTRAVENOUS at 17:46

## 2025-08-29 RX ADMIN — HYDRALAZINE HYDROCHLORIDE 10 MG: 10 TABLET ORAL at 16:17

## 2025-08-29 RX ADMIN — POTASSIUM CHLORIDE 40 MEQ: 1500 TABLET, EXTENDED RELEASE ORAL at 17:05

## 2025-08-29 RX ADMIN — AMIODARONE HYDROCHLORIDE 1 MG/MIN: 50 INJECTION, SOLUTION INTRAVENOUS at 18:13

## 2025-08-29 RX ADMIN — ISOSORBIDE DINITRATE 5 MG: 10 TABLET ORAL at 22:40

## 2025-08-29 RX ADMIN — POTASSIUM CHLORIDE 40 MEQ: 1500 TABLET, EXTENDED RELEASE ORAL at 08:01

## 2025-08-29 RX ADMIN — SODIUM CHLORIDE, PRESERVATIVE FREE 10 ML: 5 INJECTION INTRAVENOUS at 08:01

## 2025-08-29 RX ADMIN — ACETAMINOPHEN 650 MG: 325 TABLET ORAL at 22:41

## 2025-08-29 RX ADMIN — ACETAZOLAMIDE SODIUM 250 MG: 500 INJECTION, POWDER, LYOPHILIZED, FOR SOLUTION INTRAVENOUS at 20:53

## 2025-08-29 RX ADMIN — BUMETANIDE 1 MG: 0.25 INJECTION INTRAMUSCULAR; INTRAVENOUS at 20:54

## 2025-08-29 ASSESSMENT — PAIN SCALES - GENERAL
PAINLEVEL_OUTOF10: 0
PAINLEVEL_OUTOF10: 3
PAINLEVEL_OUTOF10: 0
PAINLEVEL_OUTOF10: 0

## 2025-08-29 ASSESSMENT — PAIN DESCRIPTION - ORIENTATION: ORIENTATION: MID;RIGHT;LEFT

## 2025-08-29 ASSESSMENT — PAIN - FUNCTIONAL ASSESSMENT
PAIN_FUNCTIONAL_ASSESSMENT: ACTIVITIES ARE NOT PREVENTED
PAIN_FUNCTIONAL_ASSESSMENT: 0-10
PAIN_FUNCTIONAL_ASSESSMENT: 0-10

## 2025-08-29 ASSESSMENT — PAIN DESCRIPTION - DESCRIPTORS: DESCRIPTORS: ACHING;DULL

## 2025-08-29 ASSESSMENT — PAIN DESCRIPTION - LOCATION: LOCATION: BACK

## 2025-08-30 LAB
ALBUMIN SERPL-MCNC: 3.7 G/DL (ref 3.5–5.2)
ALP SERPL-CCNC: 102 U/L (ref 40–129)
ALT SERPL-CCNC: 71 U/L (ref 0–50)
ANION GAP SERPL CALCULATED.3IONS-SCNC: 17 MMOL/L (ref 7–16)
AST SERPL-CCNC: 81 U/L (ref 0–50)
BASOPHILS # BLD: 0.05 K/UL (ref 0–0.2)
BASOPHILS NFR BLD: 1 % (ref 0–2)
BILIRUB SERPL-MCNC: 2.9 MG/DL (ref 0–1.2)
BUN SERPL-MCNC: 45 MG/DL (ref 6–20)
CALCIUM SERPL-MCNC: 9.3 MG/DL (ref 8.6–10)
CHLORIDE SERPL-SCNC: 103 MMOL/L (ref 98–107)
CO2 SERPL-SCNC: 18 MMOL/L (ref 22–29)
CREAT SERPL-MCNC: 2.7 MG/DL (ref 0.7–1.2)
EOSINOPHIL # BLD: 0.05 K/UL (ref 0.05–0.5)
EOSINOPHILS RELATIVE PERCENT: 1 % (ref 0–6)
ERYTHROCYTE [DISTWIDTH] IN BLOOD BY AUTOMATED COUNT: 14.9 % (ref 11.5–15)
GFR, ESTIMATED: 27 ML/MIN/1.73M2
GLUCOSE SERPL-MCNC: 163 MG/DL (ref 74–99)
HCT VFR BLD AUTO: 41.9 % (ref 37–54)
HGB BLD-MCNC: 13.9 G/DL (ref 12.5–16.5)
IMM GRANULOCYTES # BLD AUTO: <0.03 K/UL (ref 0–0.58)
IMM GRANULOCYTES NFR BLD: 0 % (ref 0–5)
LYMPHOCYTES NFR BLD: 2.13 K/UL (ref 1.5–4)
LYMPHOCYTES RELATIVE PERCENT: 34 % (ref 20–42)
MCH RBC QN AUTO: 29.7 PG (ref 26–35)
MCHC RBC AUTO-ENTMCNC: 33.2 G/DL (ref 32–34.5)
MCV RBC AUTO: 89.5 FL (ref 80–99.9)
MONOCYTES NFR BLD: 0.41 K/UL (ref 0.1–0.95)
MONOCYTES NFR BLD: 7 % (ref 2–12)
NEUTROPHILS NFR BLD: 58 % (ref 43–80)
NEUTS SEG NFR BLD: 3.64 K/UL (ref 1.8–7.3)
PLATELET # BLD AUTO: 181 K/UL (ref 130–450)
PMV BLD AUTO: 10.7 FL (ref 7–12)
POTASSIUM SERPL-SCNC: 4.3 MMOL/L (ref 3.5–5.1)
PROT SERPL-MCNC: 6.7 G/DL (ref 6.4–8.3)
RBC # BLD AUTO: 4.68 M/UL (ref 3.8–5.8)
SODIUM SERPL-SCNC: 138 MMOL/L (ref 136–145)
WBC OTHER # BLD: 6.3 K/UL (ref 4.5–11.5)

## 2025-08-30 PROCEDURE — 85025 COMPLETE CBC W/AUTO DIFF WBC: CPT

## 2025-08-30 PROCEDURE — 6370000000 HC RX 637 (ALT 250 FOR IP): Performed by: NURSE PRACTITIONER

## 2025-08-30 PROCEDURE — 36415 COLL VENOUS BLD VENIPUNCTURE: CPT

## 2025-08-30 PROCEDURE — 6370000000 HC RX 637 (ALT 250 FOR IP): Performed by: INTERNAL MEDICINE

## 2025-08-30 PROCEDURE — 2500000003 HC RX 250 WO HCPCS: Performed by: NURSE PRACTITIONER

## 2025-08-30 PROCEDURE — 2580000003 HC RX 258: Performed by: NURSE PRACTITIONER

## 2025-08-30 PROCEDURE — 2700000000 HC OXYGEN THERAPY PER DAY

## 2025-08-30 PROCEDURE — 80053 COMPREHEN METABOLIC PANEL: CPT

## 2025-08-30 PROCEDURE — 99233 SBSQ HOSP IP/OBS HIGH 50: CPT | Performed by: INTERNAL MEDICINE

## 2025-08-30 PROCEDURE — 6360000002 HC RX W HCPCS: Performed by: NURSE PRACTITIONER

## 2025-08-30 PROCEDURE — 2060000000 HC ICU INTERMEDIATE R&B

## 2025-08-30 RX ORDER — AMIODARONE HYDROCHLORIDE 200 MG/1
200 TABLET ORAL DAILY
Status: DISCONTINUED | OUTPATIENT
Start: 2025-09-05 | End: 2025-08-31

## 2025-08-30 RX ORDER — AMIODARONE HYDROCHLORIDE 200 MG/1
400 TABLET ORAL 2 TIMES DAILY
Status: DISCONTINUED | OUTPATIENT
Start: 2025-08-30 | End: 2025-08-31

## 2025-08-30 RX ADMIN — BUMETANIDE 1 MG: 0.25 INJECTION INTRAMUSCULAR; INTRAVENOUS at 09:57

## 2025-08-30 RX ADMIN — HYDRALAZINE HYDROCHLORIDE 10 MG: 10 TABLET ORAL at 20:19

## 2025-08-30 RX ADMIN — POTASSIUM CHLORIDE 20 MEQ: 1500 TABLET, EXTENDED RELEASE ORAL at 09:56

## 2025-08-30 RX ADMIN — ASPIRIN 81 MG: 81 TABLET, COATED ORAL at 09:57

## 2025-08-30 RX ADMIN — SODIUM CHLORIDE, PRESERVATIVE FREE 10 ML: 5 INJECTION INTRAVENOUS at 09:57

## 2025-08-30 RX ADMIN — POTASSIUM CHLORIDE 40 MEQ: 1500 TABLET, EXTENDED RELEASE ORAL at 09:56

## 2025-08-30 RX ADMIN — ISOSORBIDE DINITRATE 5 MG: 10 TABLET ORAL at 09:57

## 2025-08-30 RX ADMIN — ONDANSETRON 4 MG: 2 INJECTION, SOLUTION INTRAMUSCULAR; INTRAVENOUS at 12:31

## 2025-08-30 RX ADMIN — ISOSORBIDE DINITRATE 5 MG: 10 TABLET ORAL at 20:18

## 2025-08-30 RX ADMIN — HYDRALAZINE HYDROCHLORIDE 10 MG: 10 TABLET ORAL at 05:51

## 2025-08-30 RX ADMIN — ROSUVASTATIN CALCIUM 10 MG: 10 TABLET, FILM COATED ORAL at 09:57

## 2025-08-30 RX ADMIN — AMIODARONE HYDROCHLORIDE 0.5 MG/MIN: 50 INJECTION, SOLUTION INTRAVENOUS at 06:02

## 2025-08-30 RX ADMIN — AMIODARONE HYDROCHLORIDE 400 MG: 200 TABLET ORAL at 20:17

## 2025-08-30 RX ADMIN — METOPROLOL SUCCINATE 25 MG: 25 TABLET, EXTENDED RELEASE ORAL at 20:19

## 2025-08-30 RX ADMIN — ACETAMINOPHEN 650 MG: 325 TABLET ORAL at 17:27

## 2025-08-30 RX ADMIN — METOPROLOL SUCCINATE 25 MG: 25 TABLET, EXTENDED RELEASE ORAL at 09:57

## 2025-08-30 RX ADMIN — RIVAROXABAN 15 MG: 15 TABLET, FILM COATED ORAL at 18:40

## 2025-08-30 RX ADMIN — POTASSIUM CHLORIDE 40 MEQ: 1500 TABLET, EXTENDED RELEASE ORAL at 20:19

## 2025-08-30 RX ADMIN — SODIUM CHLORIDE, PRESERVATIVE FREE 10 ML: 5 INJECTION INTRAVENOUS at 20:21

## 2025-08-30 ASSESSMENT — PAIN SCALES - GENERAL
PAINLEVEL_OUTOF10: 6
PAINLEVEL_OUTOF10: 0

## 2025-08-30 ASSESSMENT — PAIN DESCRIPTION - LOCATION: LOCATION: FACE

## 2025-08-30 ASSESSMENT — PAIN - FUNCTIONAL ASSESSMENT: PAIN_FUNCTIONAL_ASSESSMENT: 0-10

## 2025-08-31 ENCOUNTER — APPOINTMENT (OUTPATIENT)
Age: 60
DRG: 291 | End: 2025-08-31
Attending: INTERNAL MEDICINE
Payer: MEDICARE

## 2025-08-31 ENCOUNTER — APPOINTMENT (OUTPATIENT)
Dept: GENERAL RADIOLOGY | Age: 60
DRG: 291 | End: 2025-08-31
Payer: MEDICARE

## 2025-08-31 ENCOUNTER — APPOINTMENT (OUTPATIENT)
Dept: ULTRASOUND IMAGING | Age: 60
DRG: 291 | End: 2025-08-31
Payer: MEDICARE

## 2025-08-31 VITALS
SYSTOLIC BLOOD PRESSURE: 115 MMHG | HEIGHT: 72 IN | OXYGEN SATURATION: 100 % | DIASTOLIC BLOOD PRESSURE: 86 MMHG | RESPIRATION RATE: 18 BRPM | BODY MASS INDEX: 26.17 KG/M2 | WEIGHT: 193.2 LBS | TEMPERATURE: 98.7 F | HEART RATE: 85 BPM

## 2025-08-31 LAB
ALBUMIN SERPL-MCNC: 3.5 G/DL (ref 3.5–5.2)
ALP SERPL-CCNC: 130 U/L (ref 40–129)
ALT SERPL-CCNC: 528 U/L (ref 0–50)
ANION GAP SERPL CALCULATED.3IONS-SCNC: 15 MMOL/L (ref 7–16)
ANION GAP SERPL CALCULATED.3IONS-SCNC: 17 MMOL/L (ref 7–16)
AST SERPL-CCNC: 568 U/L (ref 0–50)
BASOPHILS # BLD: 0.06 K/UL (ref 0–0.2)
BASOPHILS NFR BLD: 1 % (ref 0–2)
BILIRUB SERPL-MCNC: 3.7 MG/DL (ref 0–1.2)
BUN SERPL-MCNC: 57 MG/DL (ref 6–20)
BUN SERPL-MCNC: 57 MG/DL (ref 6–20)
CALCIUM SERPL-MCNC: 9.2 MG/DL (ref 8.6–10)
CALCIUM SERPL-MCNC: 9.5 MG/DL (ref 8.6–10)
CHLORIDE SERPL-SCNC: 102 MMOL/L (ref 98–107)
CHLORIDE SERPL-SCNC: 99 MMOL/L (ref 98–107)
CO2 SERPL-SCNC: 17 MMOL/L (ref 22–29)
CO2 SERPL-SCNC: 22 MMOL/L (ref 22–29)
CREAT SERPL-MCNC: 3.7 MG/DL (ref 0.7–1.2)
CREAT SERPL-MCNC: 3.9 MG/DL (ref 0.7–1.2)
CREAT UR-MCNC: 106 MG/DL (ref 40–278)
ECHO BSA: 2.09 M2
ECHO EST RA PRESSURE: 8 MMHG
ECHO LA VOL A-L A2C: 152 ML (ref 18–58)
ECHO LA VOL A-L A4C: 119 ML (ref 18–58)
ECHO LA VOL MOD A2C: 146 ML (ref 18–58)
ECHO LA VOL MOD A4C: 113 ML (ref 18–58)
ECHO LA VOLUME AREA LENGTH: 137 ML
ECHO LA VOLUME INDEX A-L A2C: 72 ML/M2 (ref 16–34)
ECHO LA VOLUME INDEX A-L A4C: 57 ML/M2 (ref 16–34)
ECHO LA VOLUME INDEX AREA LENGTH: 65 ML/M2 (ref 16–34)
ECHO LA VOLUME INDEX MOD A2C: 70 ML/M2 (ref 16–34)
ECHO LA VOLUME INDEX MOD A4C: 54 ML/M2 (ref 16–34)
ECHO LV E' LATERAL VELOCITY: 4 CM/S
ECHO LV E' SEPTAL VELOCITY: 4 CM/S
ECHO LV EDV A2C: 285 ML
ECHO LV EDV A4C: 261 ML
ECHO LV EDV BP: 275 ML (ref 67–155)
ECHO LV EDV INDEX A4C: 124 ML/M2
ECHO LV EDV INDEX BP: 131 ML/M2
ECHO LV EDV NDEX A2C: 136 ML/M2
ECHO LV EF PHYSICIAN: 12 %
ECHO LV EJECTION FRACTION A2C: 11 %
ECHO LV EJECTION FRACTION A4C: 15 %
ECHO LV EJECTION FRACTION BIPLANE: 11 % (ref 55–100)
ECHO LV ESV A2C: 255 ML
ECHO LV ESV A4C: 221 ML
ECHO LV ESV BP: 245 ML (ref 22–58)
ECHO LV ESV INDEX A2C: 121 ML/M2
ECHO LV ESV INDEX A4C: 105 ML/M2
ECHO LV ESV INDEX BP: 117 ML/M2
ECHO LV FRACTIONAL SHORTENING: 4 % (ref 28–44)
ECHO LV INTERNAL DIMENSION DIASTOLE INDEX: 3.9 CM/M2
ECHO LV INTERNAL DIMENSION DIASTOLIC: 8.2 CM (ref 4.2–5.9)
ECHO LV INTERNAL DIMENSION SYSTOLIC INDEX: 3.76 CM/M2
ECHO LV INTERNAL DIMENSION SYSTOLIC: 7.9 CM
ECHO LV IVSD: 1 CM (ref 0.6–1)
ECHO LV IVSS: 1.1 CM
ECHO LV MASS 2D: 505 G (ref 88–224)
ECHO LV MASS INDEX 2D: 240.5 G/M2 (ref 49–115)
ECHO LV POSTERIOR WALL DIASTOLIC: 1.3 CM (ref 0.6–1)
ECHO LV POSTERIOR WALL SYSTOLIC: 1.6 CM
ECHO LV RELATIVE WALL THICKNESS RATIO: 0.32
ECHO MV A VELOCITY: 0.49 M/S
ECHO MV E DECELERATION TIME (DT): 183.8 MS
ECHO MV E VELOCITY: 0.97 M/S
ECHO MV E/A RATIO: 1.98
ECHO MV E/E' LATERAL: 24.25
ECHO MV E/E' RATIO (AVERAGED): 24.25
ECHO MV E/E' SEPTAL: 24.25
ECHO MV EROA PISA: 0.5 CM2
ECHO MV REGURGITANT ALIASING (NYQUIST) VELOCITY: 31 CM/S
ECHO MV REGURGITANT RADIUS PISA: 1.13 CM
ECHO MV REGURGITANT VELOCITY PISA: 4.7 M/S
ECHO MV REGURGITANT VOLUME PISA: 82.99 ML
ECHO MV REGURGITANT VTIA: 156.9 CM
ECHO RIGHT VENTRICULAR SYSTOLIC PRESSURE (RVSP): 56 MMHG
ECHO RV INTERNAL DIMENSION: 3.7 CM
ECHO RV TAPSE: 1.6 CM (ref 1.7–?)
ECHO TV REGURGITANT MAX VELOCITY: 3.45 M/S
ECHO TV REGURGITANT PEAK GRADIENT: 48 MMHG
EOSINOPHIL # BLD: 0.04 K/UL (ref 0.05–0.5)
EOSINOPHILS RELATIVE PERCENT: 1 % (ref 0–6)
ERYTHROCYTE [DISTWIDTH] IN BLOOD BY AUTOMATED COUNT: 15 % (ref 11.5–15)
GFR, ESTIMATED: 17 ML/MIN/1.73M2
GFR, ESTIMATED: 18 ML/MIN/1.73M2
GLUCOSE BLD-MCNC: 120 MG/DL (ref 74–99)
GLUCOSE BLD-MCNC: 169 MG/DL (ref 74–99)
GLUCOSE BLD-MCNC: 189 MG/DL (ref 74–99)
GLUCOSE BLD-MCNC: 217 MG/DL (ref 74–99)
GLUCOSE BLD-MCNC: 321 MG/DL (ref 74–99)
GLUCOSE SERPL-MCNC: 154 MG/DL (ref 74–99)
GLUCOSE SERPL-MCNC: 208 MG/DL (ref 74–99)
HCT VFR BLD AUTO: 40.8 % (ref 37–54)
HGB BLD-MCNC: 13.4 G/DL (ref 12.5–16.5)
IMM GRANULOCYTES # BLD AUTO: 0.03 K/UL (ref 0–0.58)
IMM GRANULOCYTES NFR BLD: 0 % (ref 0–5)
LACTATE BLDV-SCNC: 2.2 MMOL/L (ref 0.5–2.2)
LYMPHOCYTES NFR BLD: 2.05 K/UL (ref 1.5–4)
LYMPHOCYTES RELATIVE PERCENT: 25 % (ref 20–42)
MCH RBC QN AUTO: 29.6 PG (ref 26–35)
MCHC RBC AUTO-ENTMCNC: 32.8 G/DL (ref 32–34.5)
MCV RBC AUTO: 90.3 FL (ref 80–99.9)
MONOCYTES NFR BLD: 0.59 K/UL (ref 0.1–0.95)
MONOCYTES NFR BLD: 7 % (ref 2–12)
NEUTROPHILS NFR BLD: 66 % (ref 43–80)
NEUTS SEG NFR BLD: 5.45 K/UL (ref 1.8–7.3)
PLATELET # BLD AUTO: 176 K/UL (ref 130–450)
PMV BLD AUTO: 10.8 FL (ref 7–12)
POTASSIUM SERPL-SCNC: 5 MMOL/L (ref 3.5–5.1)
POTASSIUM SERPL-SCNC: 5.6 MMOL/L (ref 3.5–5.1)
POTASSIUM SERPL-SCNC: 6.2 MMOL/L (ref 3.5–5.1)
PROT SERPL-MCNC: 6.2 G/DL (ref 6.4–8.3)
RBC # BLD AUTO: 4.52 M/UL (ref 3.8–5.8)
SODIUM SERPL-SCNC: 135 MMOL/L (ref 136–145)
SODIUM SERPL-SCNC: 136 MMOL/L (ref 136–145)
SODIUM UR-SCNC: 32 MMOL/L
UUN UR-MCNC: 313 MG/DL (ref 800–1666)
WBC OTHER # BLD: 8.2 K/UL (ref 4.5–11.5)

## 2025-08-31 PROCEDURE — 93306 TTE W/DOPPLER COMPLETE: CPT | Performed by: INTERNAL MEDICINE

## 2025-08-31 PROCEDURE — 99233 SBSQ HOSP IP/OBS HIGH 50: CPT | Performed by: INTERNAL MEDICINE

## 2025-08-31 PROCEDURE — 6360000004 HC RX CONTRAST MEDICATION: Performed by: INTERNAL MEDICINE

## 2025-08-31 PROCEDURE — 84300 ASSAY OF URINE SODIUM: CPT

## 2025-08-31 PROCEDURE — 6370000000 HC RX 637 (ALT 250 FOR IP): Performed by: INTERNAL MEDICINE

## 2025-08-31 PROCEDURE — 6370000000 HC RX 637 (ALT 250 FOR IP): Performed by: NURSE PRACTITIONER

## 2025-08-31 PROCEDURE — 6360000002 HC RX W HCPCS: Performed by: INTERNAL MEDICINE

## 2025-08-31 PROCEDURE — 82962 GLUCOSE BLOOD TEST: CPT

## 2025-08-31 PROCEDURE — 71046 X-RAY EXAM CHEST 2 VIEWS: CPT

## 2025-08-31 PROCEDURE — 80048 BASIC METABOLIC PNL TOTAL CA: CPT

## 2025-08-31 PROCEDURE — 94640 AIRWAY INHALATION TREATMENT: CPT

## 2025-08-31 PROCEDURE — 83605 ASSAY OF LACTIC ACID: CPT

## 2025-08-31 PROCEDURE — 80053 COMPREHEN METABOLIC PANEL: CPT

## 2025-08-31 PROCEDURE — 2500000003 HC RX 250 WO HCPCS: Performed by: NURSE PRACTITIONER

## 2025-08-31 PROCEDURE — C8924 2D TTE W OR W/O FOL W/CON,FU: HCPCS

## 2025-08-31 PROCEDURE — 84540 ASSAY OF URINE/UREA-N: CPT

## 2025-08-31 PROCEDURE — 84132 ASSAY OF SERUM POTASSIUM: CPT

## 2025-08-31 PROCEDURE — 2500000003 HC RX 250 WO HCPCS: Performed by: INTERNAL MEDICINE

## 2025-08-31 PROCEDURE — 82570 ASSAY OF URINE CREATININE: CPT

## 2025-08-31 PROCEDURE — 2580000003 HC RX 258: Performed by: INTERNAL MEDICINE

## 2025-08-31 PROCEDURE — 85025 COMPLETE CBC W/AUTO DIFF WBC: CPT

## 2025-08-31 PROCEDURE — 36415 COLL VENOUS BLD VENIPUNCTURE: CPT

## 2025-08-31 RX ORDER — ALBUTEROL SULFATE 0.83 MG/ML
10 SOLUTION RESPIRATORY (INHALATION) ONCE
Status: COMPLETED | OUTPATIENT
Start: 2025-08-31 | End: 2025-08-31

## 2025-08-31 RX ORDER — GLUCAGON 1 MG/ML
1 KIT INJECTION PRN
Status: DISCONTINUED | OUTPATIENT
Start: 2025-08-31 | End: 2025-08-31 | Stop reason: HOSPADM

## 2025-08-31 RX ORDER — SODIUM CHLORIDE 9 MG/ML
INJECTION, SOLUTION INTRAVENOUS CONTINUOUS
Status: DISCONTINUED | OUTPATIENT
Start: 2025-08-31 | End: 2025-08-31 | Stop reason: HOSPADM

## 2025-08-31 RX ORDER — CALCIUM GLUCONATE 20 MG/ML
1000 INJECTION, SOLUTION INTRAVENOUS ONCE
Status: COMPLETED | OUTPATIENT
Start: 2025-08-31 | End: 2025-08-31

## 2025-08-31 RX ORDER — HYDRALAZINE HYDROCHLORIDE 10 MG/1
10 TABLET, FILM COATED ORAL EVERY 8 HOURS SCHEDULED
Qty: 90 TABLET | Refills: 3
Start: 2025-08-31

## 2025-08-31 RX ORDER — DEXTROSE MONOHYDRATE 100 MG/ML
INJECTION, SOLUTION INTRAVENOUS CONTINUOUS PRN
Status: DISCONTINUED | OUTPATIENT
Start: 2025-08-31 | End: 2025-08-31 | Stop reason: HOSPADM

## 2025-08-31 RX ORDER — ISOSORBIDE DINITRATE 5 MG/1
5 TABLET ORAL 3 TIMES DAILY
Qty: 90 TABLET | Refills: 3 | Status: SHIPPED | OUTPATIENT
Start: 2025-08-31

## 2025-08-31 RX ORDER — BUMETANIDE 0.25 MG/ML
1 INJECTION, SOLUTION INTRAMUSCULAR; INTRAVENOUS 2 TIMES DAILY
Qty: 240 ML | Refills: 0
Start: 2025-08-31

## 2025-08-31 RX ORDER — INDOMETHACIN 25 MG/1
50 CAPSULE ORAL ONCE
Status: COMPLETED | OUTPATIENT
Start: 2025-08-31 | End: 2025-08-31

## 2025-08-31 RX ADMIN — SODIUM ZIRCONIUM CYCLOSILICATE 10 G: 10 POWDER, FOR SUSPENSION ORAL at 11:26

## 2025-08-31 RX ADMIN — SODIUM ZIRCONIUM CYCLOSILICATE 10 G: 10 POWDER, FOR SUSPENSION ORAL at 19:22

## 2025-08-31 RX ADMIN — DEXTROSE 250 ML: 10 SOLUTION INTRAVENOUS at 08:48

## 2025-08-31 RX ADMIN — SODIUM POLYSTYRENE SULFONATE 15 G: 15 SUSPENSION ORAL at 08:37

## 2025-08-31 RX ADMIN — HUMAN INSULIN 10 UNITS: 100 INJECTION, SOLUTION SUBCUTANEOUS at 08:43

## 2025-08-31 RX ADMIN — METOPROLOL SUCCINATE 25 MG: 25 TABLET, EXTENDED RELEASE ORAL at 19:22

## 2025-08-31 RX ADMIN — SODIUM CHLORIDE, PRESERVATIVE FREE 10 ML: 5 INJECTION INTRAVENOUS at 19:23

## 2025-08-31 RX ADMIN — SODIUM BICARBONATE 50 MEQ: 84 INJECTION, SOLUTION INTRAVENOUS at 08:46

## 2025-08-31 RX ADMIN — HYDRALAZINE HYDROCHLORIDE 10 MG: 10 TABLET ORAL at 05:58

## 2025-08-31 RX ADMIN — AMIODARONE HYDROCHLORIDE 400 MG: 200 TABLET ORAL at 10:02

## 2025-08-31 RX ADMIN — SULFUR HEXAFLUORIDE 2 ML: 60.7; .19; .19 INJECTION, POWDER, LYOPHILIZED, FOR SUSPENSION INTRAVENOUS; INTRAVESICAL at 13:45

## 2025-08-31 RX ADMIN — CALCIUM GLUCONATE 1000 MG: 20 INJECTION, SOLUTION INTRAVENOUS at 08:39

## 2025-08-31 RX ADMIN — ISOSORBIDE DINITRATE 5 MG: 10 TABLET ORAL at 10:03

## 2025-08-31 RX ADMIN — SODIUM CHLORIDE, PRESERVATIVE FREE 10 ML: 5 INJECTION INTRAVENOUS at 10:03

## 2025-08-31 RX ADMIN — ISOSORBIDE DINITRATE 5 MG: 10 TABLET ORAL at 19:23

## 2025-08-31 RX ADMIN — RIVAROXABAN 15 MG: 15 TABLET, FILM COATED ORAL at 18:23

## 2025-08-31 RX ADMIN — ALBUTEROL SULFATE 10 MG: 2.5 SOLUTION RESPIRATORY (INHALATION) at 08:39

## 2025-08-31 RX ADMIN — ASPIRIN 81 MG: 81 TABLET, COATED ORAL at 10:02

## 2025-08-31 RX ADMIN — METOPROLOL SUCCINATE 25 MG: 25 TABLET, EXTENDED RELEASE ORAL at 10:03

## 2025-09-04 ENCOUNTER — TELEPHONE (OUTPATIENT)
Dept: OTHER | Age: 60
End: 2025-09-04

## 2025-09-04 LAB
EKG ATRIAL RATE: 70 BPM
EKG P AXIS: 75 DEGREES
EKG P-R INTERVAL: 266 MS
EKG Q-T INTERVAL: 504 MS
EKG QRS DURATION: 192 MS
EKG QTC CALCULATION (BAZETT): 544 MS
EKG R AXIS: 140 DEGREES
EKG T AXIS: -36 DEGREES
EKG VENTRICULAR RATE: 70 BPM

## (undated) DEVICE — SPONGE GZ W4XL4IN RAYON POLY FILL CVR W/ NONWOVEN FAB

## (undated) DEVICE — SNARE ENDOSCP L240CM LOOP W13MM SHTH DIA2.4MM SM OVL FLX

## (undated) DEVICE — FORCEPS BX L240CM JAW DIA2.4MM ORNG L CAP W/ NDL DISP RAD

## (undated) DEVICE — TRAP POLYP ETRAP

## (undated) DEVICE — GRADUATE TRIANG MEASURE 1000ML BLK PRNT